# Patient Record
Sex: MALE | Race: WHITE | NOT HISPANIC OR LATINO | Employment: OTHER | URBAN - METROPOLITAN AREA
[De-identification: names, ages, dates, MRNs, and addresses within clinical notes are randomized per-mention and may not be internally consistent; named-entity substitution may affect disease eponyms.]

---

## 2017-01-18 ENCOUNTER — HOSPITAL ENCOUNTER (OUTPATIENT)
Dept: LAB | Facility: MEDICAL CENTER | Age: 67
End: 2017-01-18
Attending: FAMILY MEDICINE
Payer: MEDICARE

## 2017-01-18 DIAGNOSIS — Z00.00 HEALTH CARE MAINTENANCE: ICD-10-CM

## 2017-01-18 LAB
ALBUMIN SERPL BCP-MCNC: 4.3 G/DL (ref 3.2–4.9)
ALBUMIN/GLOB SERPL: 1.7 G/DL
ALP SERPL-CCNC: 56 U/L (ref 30–99)
ALT SERPL-CCNC: 18 U/L (ref 2–50)
ANION GAP SERPL CALC-SCNC: 7 MMOL/L (ref 0–11.9)
AST SERPL-CCNC: 16 U/L (ref 12–45)
BASOPHILS # BLD AUTO: 0.06 K/UL (ref 0–0.12)
BASOPHILS NFR BLD AUTO: 1 % (ref 0–1.8)
BILIRUB SERPL-MCNC: 0.8 MG/DL (ref 0.1–1.5)
BUN SERPL-MCNC: 14 MG/DL (ref 8–22)
CALCIUM SERPL-MCNC: 10 MG/DL (ref 8.5–10.5)
CHLORIDE SERPL-SCNC: 103 MMOL/L (ref 96–112)
CHOLEST SERPL-MCNC: 338 MG/DL (ref 100–199)
CO2 SERPL-SCNC: 25 MMOL/L (ref 20–33)
CREAT SERPL-MCNC: 1.04 MG/DL (ref 0.5–1.4)
EOSINOPHIL # BLD: 0.25 K/UL (ref 0–0.51)
EOSINOPHIL NFR BLD AUTO: 4.1 % (ref 0–6.9)
ERYTHROCYTE [DISTWIDTH] IN BLOOD BY AUTOMATED COUNT: 46 FL (ref 35.9–50)
GLOBULIN SER CALC-MCNC: 2.5 G/DL (ref 1.9–3.5)
GLUCOSE SERPL-MCNC: 113 MG/DL (ref 65–99)
HCT VFR BLD AUTO: 48.6 % (ref 42–52)
HDLC SERPL-MCNC: 55 MG/DL
HGB BLD-MCNC: 16.6 G/DL (ref 14–18)
IMM GRANULOCYTES # BLD AUTO: 0.03 K/UL (ref 0–0.11)
IMM GRANULOCYTES NFR BLD AUTO: 0.5 % (ref 0–0.9)
LDLC SERPL CALC-MCNC: 234 MG/DL
LYMPHOCYTES # BLD: 2.33 K/UL (ref 1–4.8)
LYMPHOCYTES NFR BLD AUTO: 38.1 % (ref 22–41)
MCH RBC QN AUTO: 31.4 PG (ref 27–33)
MCHC RBC AUTO-ENTMCNC: 34.2 G/DL (ref 33.7–35.3)
MCV RBC AUTO: 92 FL (ref 81.4–97.8)
MONOCYTES # BLD: 0.35 K/UL (ref 0–0.85)
MONOCYTES NFR BLD AUTO: 5.7 % (ref 0–13.4)
NEUTROPHILS # BLD: 3.1 K/UL (ref 1.82–7.42)
NEUTROPHILS NFR BLD AUTO: 50.6 % (ref 44–72)
NRBC # BLD AUTO: 0 K/UL
NRBC BLD-RTO: 0 /100 WBC
PLATELET # BLD AUTO: 216 K/UL (ref 164–446)
PMV BLD AUTO: 11.8 FL (ref 9–12.9)
POTASSIUM SERPL-SCNC: 4.5 MMOL/L (ref 3.6–5.5)
PROT SERPL-MCNC: 6.8 G/DL (ref 6–8.2)
PSA SERPL DL<=0.01 NG/ML-MCNC: 0.6 NG/ML (ref 0–4)
RBC # BLD AUTO: 5.28 M/UL (ref 4.7–6.1)
SODIUM SERPL-SCNC: 135 MMOL/L (ref 135–145)
TRIGL SERPL-MCNC: 246 MG/DL (ref 0–149)
TSH SERPL DL<=0.005 MIU/L-ACNC: 1.2 UIU/ML (ref 0.3–3.7)
WBC # BLD AUTO: 6.1 K/UL (ref 4.8–10.8)

## 2017-01-18 PROCEDURE — 36415 COLL VENOUS BLD VENIPUNCTURE: CPT | Mod: GY

## 2017-01-18 PROCEDURE — 85025 COMPLETE CBC W/AUTO DIFF WBC: CPT | Mod: GY

## 2017-01-18 PROCEDURE — 84443 ASSAY THYROID STIM HORMONE: CPT | Mod: GY

## 2017-01-18 PROCEDURE — 80061 LIPID PANEL: CPT | Mod: GY

## 2017-01-18 PROCEDURE — 84153 ASSAY OF PSA TOTAL: CPT | Mod: GY

## 2017-01-18 PROCEDURE — 80053 COMPREHEN METABOLIC PANEL: CPT | Mod: GY

## 2017-01-19 ENCOUNTER — OFFICE VISIT (OUTPATIENT)
Dept: MEDICAL GROUP | Facility: MEDICAL CENTER | Age: 67
End: 2017-01-19
Payer: MEDICARE

## 2017-01-19 VITALS
TEMPERATURE: 98.1 F | BODY MASS INDEX: 26.99 KG/M2 | DIASTOLIC BLOOD PRESSURE: 90 MMHG | OXYGEN SATURATION: 95 % | HEIGHT: 72 IN | SYSTOLIC BLOOD PRESSURE: 158 MMHG | WEIGHT: 199.3 LBS | HEART RATE: 70 BPM | RESPIRATION RATE: 20 BRPM

## 2017-01-19 DIAGNOSIS — E78.2 MIXED HYPERLIPIDEMIA: ICD-10-CM

## 2017-01-19 DIAGNOSIS — R73.01 IMPAIRED FASTING GLUCOSE: ICD-10-CM

## 2017-01-19 DIAGNOSIS — I10 ESSENTIAL HYPERTENSION: ICD-10-CM

## 2017-01-19 PROCEDURE — 99214 OFFICE O/P EST MOD 30 MIN: CPT | Performed by: FAMILY MEDICINE

## 2017-01-19 RX ORDER — LISINOPRIL 20 MG/1
20 TABLET ORAL DAILY
Qty: 90 TAB | Refills: 3 | Status: SHIPPED | OUTPATIENT
Start: 2017-01-19 | End: 2017-11-15 | Stop reason: SDUPTHER

## 2017-01-19 RX ORDER — ATORVASTATIN CALCIUM 80 MG/1
80 TABLET, FILM COATED ORAL EVERY EVENING
Qty: 30 TAB | Refills: 11 | Status: SHIPPED | OUTPATIENT
Start: 2017-01-19 | End: 2017-01-19 | Stop reason: SDUPTHER

## 2017-01-19 RX ORDER — ATORVASTATIN CALCIUM 80 MG/1
80 TABLET, FILM COATED ORAL EVERY EVENING
Qty: 30 TAB | Refills: 11 | Status: SHIPPED | OUTPATIENT
Start: 2017-01-19 | End: 2017-11-15 | Stop reason: SDUPTHER

## 2017-01-19 NOTE — MR AVS SNAPSHOT
Prieto Holm   2017 10:00 AM   Office Visit   MRN: [id number]    Department:  [place] [address]   Dept Phone:  [phone]    Description:  Male : 1950   Provider:  Radha Fleming M.D.           Reason for Visit     Hyperlipidemia elevated sugar levels, review recent lab results      Allergies as of 2017     No Known Allergies      You were diagnosed with     Mixed hyperlipidemia   [272.2.ICD-9-CM]       Impaired fasting glucose   [790.21.ICD-9-CM]       Essential hypertension   [5248814]         Vital Signs     Blood Pressure Pulse Temperature Respirations Height Weight    158/90 mmHg 70 36.7 °C (98.1 °F) 20 1.829 m (6') 90.4 kg (199 lb 4.7 oz)    Body Mass Index Oxygen Saturation Smoking Status             27.02 kg/m2 95% Former Smoker         Basic Information     Date Of Birth Sex Race Ethnicity Preferred Language    1950 Male White Non- English      Your appointments     2017 11:20 AM   Established Patient with Radha Fleming M.D.   [facility]    [address]  [address]  [place] [id number] [phone]           You will be receiving a confirmation call a few days before your appointment from our automated call confirmation system.              Problem List              ICD-10-CM Priority Class Noted - Resolved    Gout M10.9   Unknown - Present    Hyperlipidemia E78.5   Unknown - Present    Hypertension I10   Unknown - Present    History of bladder cancer Z85.51   10/18/2016 - Present    Primary insomnia F51.01   10/18/2016 - Present    Acute pain of left knee M25.562   10/18/2016 - Present    Health care maintenance Z00.00   10/18/2016 - Present    Impaired fasting glucose R73.01   2017 - Present      Health Maintenance        Date Due Completion Dates    COLONOSCOPY 10/25/2001 ---    IMM ZOSTER VACCINE 10/25/2011 ---    IMM PNEUMOCOCCAL 65+ (ADULT) LOW/MEDIUM RISK SERIES (1  of 2 - PCV13) 10/25/2016 ---    IMM DTaP/Tdap/Td Vaccine (2 - Td) 11/18/2024 11/18/2014            Current Immunizations     Influenza Vaccine Quad Inj (Preserved) 10/18/2016    Tdap Vaccine 11/18/2014      Below and/or attached are the medications your provider expects you to take. Review all of your home medications and newly ordered medications with your provider and/or pharmacist. Follow medication instructions as directed by your provider and/or pharmacist. Please keep your medication list with you and share with your provider. Update the information when medications are discontinued, doses are changed, or new medications (including over-the-counter products) are added; and carry medication information at all times in the event of emergency situations     Allergies:  No Known Allergies          Medications  Valid as of: January 19, 2017 - 10:21 AM    Generic Name Brand Name Tablet Size Instructions for use    Aspirin (Tablet Delayed Response) ECOTRIN 81 MG Take 1 Tab by mouth every day.        Atorvastatin Calcium (Tab) LIPITOR 80 MG Take 1 Tab by mouth every evening.        Lisinopril (Tab) PRINIVIL 20 MG Take 1 Tab by mouth every day.        .                 Medicines prescribed today were sent to:     [facility] - [place], [place] - [address] AT PORTAL TO REGISTERED [facility] SITES    [address] [facility] [address]    Phone: [phone] Fax: [phone]    Open 24 Hours?: No    [id number] - [place], [place] - [address]    [address] [facility] [place] [address]    Phone: [phone] Fax: [phone]    Open 24 Hours?: No      Medication refill instructions:       If your prescription bottle indicates you have medication refills left, it is not necessary to call your provider’s office. Please contact your pharmacy and they will refill your medication.    If your prescription bottle indicates you do not have any refills left, you may request refills at any time  through one of the following ways: The online Marketfish system (except Urgent Care), by calling your provider’s office, or by asking your pharmacy to contact your provider’s office with a refill request. Medication refills are processed only during regular business hours and may not be available until the next business day. Your provider may request additional information or to have a follow-up visit with you prior to refilling your medication.   *Please Note: Medication refills are assigned a new Rx number when refilled electronically. Your pharmacy may indicate that no refills were authorized even though a new prescription for the same medication is available at the pharmacy. Please request the medicine by name with the pharmacy before contacting your provider for a refill.        Your To Do List     Future Labs/Procedures Complete By Expires    HEMOGLOBIN A1C  As directed 1/20/2018    LIPID PROFILE  As directed 1/20/2018      Instructions    Monitor blood pressure. If it stays above 140/90 please send me an email on [facility] call or come in  Start daily aspirin 81mg  For the cholesterol medication - cut that in half for the first week          Marketfish Access Code: Activation code not generated  Current Marketfish Status: Active

## 2017-01-19 NOTE — PROGRESS NOTES
Subjective:   Prieto Holm is a 66 y.o. male here today for   Chief Complaint   Patient presents with   • Hyperlipidemia     elevated sugar levels, review recent lab results       1. Mixed hyperlipidemia  This is chronic but not controlled. Patient was on Pitavastatin (Lovalo) in the past but quit this about 3 months ago. He wanted to see if he is controlled enough off of the medication.. He was on Crestor in the past but this did cause myalgias. He has never tried another statin. The Pitavastatin is too expensive for him.     Results for PRIETO HOLM (MRN [id number]) as of 1/19/2017 12:01   Ref. Range 1/18/2017 08:39   Cholesterol,Tot Latest Ref Range: 100-199 mg/dL 338 (H)   Triglycerides Latest Ref Range: 0-149 mg/dL 246 (H)   HDL Latest Ref Range: >=40 mg/dL 55   LDL Latest Ref Range: <100 mg/dL 234 (H)     2. Impaired fasting glucose  This is a new problem. Fasting glucose is 113. He does not watch his carbohydrate intake at all. He often eats pastries and donuts. He denies any polyuria or polydipsia. He is not overweight. She does not have a family history of diabetes.  Lab Results   Component Value Date/Time    SODIUM 135 01/18/2017 08:39 AM    POTASSIUM 4.5 01/18/2017 08:39 AM    CHLORIDE 103 01/18/2017 08:39 AM    CO2 25 01/18/2017 08:39 AM    GLUCOSE 113* 01/18/2017 08:39 AM    BUN 14 01/18/2017 08:39 AM    CREATININE 1.04 01/18/2017 08:39 AM        3. Essential hypertension  This is chronic. He is currently on lisinopril 20 mg daily. He forgot to take this medication last night. He is elevated today. He denies any headache, shortness of breath, cough, lower extremity edema. He is not on a daily aspirin.    Current medicines (including changes today)  Current Outpatient Prescriptions   Medication Sig Dispense Refill   • atorvastatin (LIPITOR) 80 MG tablet Take 1 Tab by mouth every evening. 30 Tab 11   • lisinopril (PRINIVIL) 20 MG Tab Take 1 Tab by mouth every day. 90 Tab 3   • aspirin EC  (ECOTRIN) 81 MG Tablet Delayed Response Take 1 Tab by mouth every day. 30 Tab      No current facility-administered medications for this visit.     He  has a past medical history of Gout; Hyperlipidemia; Hypertension; and Cancer (CMS-HCC).    ROS   No chest pain, no shortness of breath, no abdominal pain       Objective:     Blood pressure 158/90, pulse 70, temperature 36.7 °C (98.1 °F), resp. rate 20, height 1.829 m (6'), weight 90.4 kg (199 lb 4.7 oz), SpO2 95 %. Body mass index is 27.02 kg/(m^2).   Physical Exam:  Constitutional: Alert, no distress.  Skin: Warm, dry, good turgor, no rashes in visible areas.  Eye: Equal, round and reactive, conjunctiva clear, lids normal.  ENMT: Lips without lesions, good dentition, oropharynx clear.  Neck: Trachea midline, no masses, no thyromegaly. No cervical or supraclavicular lymphadenopathy  Respiratory: Unlabored respiratory effort, lungs clear to auscultation, no wheezes, no ronchi.  Cardiovascular: Normal S1, S2, no murmur, no edema.  Abdomen: Soft, non-tender, no masses, no hepatosplenomegaly.  Psych: Alert and oriented x3, normal affect and mood.        Assessment and Plan:   The following treatment plan was discussed    1. Mixed hyperlipidemia  Chronic, not controlled  ASCVD 17.2% 10 year risk  Start atorvastatin 80 mg daily, start with half a tab for one week  Recheck labs in 3 months  Start daily aspirin  - atorvastatin (LIPITOR) 80 MG tablet; Take 1 Tab by mouth every evening.  Dispense: 30 Tab; Refill: 11  - LIPID PROFILE; Future    2. Impaired fasting glucose  This is a new problem and not controlled  Discussed risk of diabetes  Limit carbohydrates and nutrition discussed   check hemoglobin A1c in 3 months  - HEMOGLOBIN A1C; Future    3. Essential hypertension  Chronic, not controlled currently  Elevated blood pressures A likely secondary to not taking his medication over the last 2 days  Discussed importance of compliance with this medication  Home blood pressure  monitoring, patient will email me if his blood pressure remains elevated  Return in 3 months  Started daily aspirin  - lisinopril (PRINIVIL) 20 MG Tab; Take 1 Tab by mouth every day.  Dispense: 90 Tab; Refill: 3      Followup: Return in about 3 months (around 4/19/2017) for Labs, HLD, IFG, HTN, possible knee inj, long.

## 2017-01-19 NOTE — PATIENT INSTRUCTIONS
Monitor blood pressure. If it stays above 140/90 please send me an email on [facility] call or come in  Start daily aspirin 81mg  For the cholesterol medication - cut that in half for the first week

## 2017-04-12 ENCOUNTER — HOSPITAL ENCOUNTER (OUTPATIENT)
Dept: LAB | Facility: MEDICAL CENTER | Age: 67
End: 2017-04-12
Attending: FAMILY MEDICINE
Payer: MEDICARE

## 2017-04-12 DIAGNOSIS — R73.01 IMPAIRED FASTING GLUCOSE: ICD-10-CM

## 2017-04-12 DIAGNOSIS — E78.2 MIXED HYPERLIPIDEMIA: ICD-10-CM

## 2017-04-12 LAB
CHOLEST SERPL-MCNC: 181 MG/DL (ref 100–199)
EST. AVERAGE GLUCOSE BLD GHB EST-MCNC: 108 MG/DL
HBA1C MFR BLD: 5.4 % (ref 0–5.6)
HDLC SERPL-MCNC: 53 MG/DL
LDLC SERPL CALC-MCNC: 95 MG/DL
TRIGL SERPL-MCNC: 166 MG/DL (ref 0–149)

## 2017-04-12 PROCEDURE — 80061 LIPID PANEL: CPT

## 2017-04-12 PROCEDURE — 83036 HEMOGLOBIN GLYCOSYLATED A1C: CPT | Mod: GA

## 2017-04-12 PROCEDURE — 36415 COLL VENOUS BLD VENIPUNCTURE: CPT

## 2017-04-19 ENCOUNTER — OFFICE VISIT (OUTPATIENT)
Dept: MEDICAL GROUP | Facility: LAB | Age: 67
End: 2017-04-19
Payer: MEDICARE

## 2017-04-19 VITALS
DIASTOLIC BLOOD PRESSURE: 72 MMHG | HEART RATE: 60 BPM | SYSTOLIC BLOOD PRESSURE: 152 MMHG | BODY MASS INDEX: 26.58 KG/M2 | WEIGHT: 196.21 LBS | TEMPERATURE: 97.7 F | RESPIRATION RATE: 16 BRPM | OXYGEN SATURATION: 98 % | HEIGHT: 72 IN

## 2017-04-19 DIAGNOSIS — M25.562 CHRONIC PAIN OF LEFT KNEE: ICD-10-CM

## 2017-04-19 DIAGNOSIS — Z00.00 HEALTH MAINTENANCE EXAMINATION: ICD-10-CM

## 2017-04-19 DIAGNOSIS — E78.2 MIXED HYPERLIPIDEMIA: ICD-10-CM

## 2017-04-19 DIAGNOSIS — I10 ESSENTIAL HYPERTENSION: ICD-10-CM

## 2017-04-19 DIAGNOSIS — G89.29 CHRONIC PAIN OF LEFT KNEE: ICD-10-CM

## 2017-04-19 DIAGNOSIS — R73.01 IMPAIRED FASTING GLUCOSE: ICD-10-CM

## 2017-04-19 DIAGNOSIS — Z12.11 SCREENING FOR MALIGNANT NEOPLASM OF COLON: ICD-10-CM

## 2017-04-19 PROCEDURE — 1101F PT FALLS ASSESS-DOCD LE1/YR: CPT | Performed by: FAMILY MEDICINE

## 2017-04-19 PROCEDURE — 4040F PNEUMOC VAC/ADMIN/RCVD: CPT | Mod: 8P | Performed by: FAMILY MEDICINE

## 2017-04-19 PROCEDURE — G8432 DEP SCR NOT DOC, RNG: HCPCS | Performed by: FAMILY MEDICINE

## 2017-04-19 PROCEDURE — 99214 OFFICE O/P EST MOD 30 MIN: CPT | Mod: 25 | Performed by: FAMILY MEDICINE

## 2017-04-19 PROCEDURE — 20610 DRAIN/INJ JOINT/BURSA W/O US: CPT | Performed by: FAMILY MEDICINE

## 2017-04-19 PROCEDURE — 1036F TOBACCO NON-USER: CPT | Performed by: FAMILY MEDICINE

## 2017-04-19 PROCEDURE — G8419 CALC BMI OUT NRM PARAM NOF/U: HCPCS | Performed by: FAMILY MEDICINE

## 2017-04-19 NOTE — MR AVS SNAPSHOT
"        Prieto Holm   2017 11:00 AM   Office Visit   MRN: [id number]    Department:  [facility]   Dept Phone:  [phone]    Description:  Male : 1950   Provider:  Radha Fleming M.D.           Reason for Visit     Follow-Up labs results    Injections left knee      Allergies as of 2017     No Known Allergies      You were diagnosed with     Health maintenance examination   [302636]       Mixed hyperlipidemia   [272.2.ICD-9-CM]       Essential hypertension   [9834619]       Impaired fasting glucose   [790.21.ICD-9-CM]       Screening for malignant neoplasm of colon   [3907790]         Vital Signs     Blood Pressure Pulse Temperature Respirations Height Weight    152/72 mmHg 60 36.5 °C (97.7 °F) 16 1.829 m (6' 0.01\") 89 kg (196 lb 3.4 oz)    Body Mass Index Oxygen Saturation Smoking Status             26.60 kg/m2 98% Former Smoker         Basic Information     Date Of Birth Sex Race Ethnicity Preferred Language    1950 Male White Non- English      Your appointments     Nov 15, 2017 11:00 AM   ANNUAL EXAM PREVENTATIVE with Radha Fleming M.D.   [facility] [place] [facility] [place] (--)    [address] [address] [phone]              Problem List              ICD-10-CM Priority Class Noted - Resolved    Gout M10.9   Unknown - Present    Hyperlipidemia E78.5   Unknown - Present    Hypertension I10   Unknown - Present    History of bladder cancer Z85.51   10/18/2016 - Present    Primary insomnia F51.01   10/18/2016 - Present    Acute pain of left knee M25.562   10/18/2016 - Present    Health care maintenance Z00.00   10/18/2016 - Present    Impaired fasting glucose R73.01   2017 - Present      Health Maintenance        Date Due Completion Dates    COLONOSCOPY 10/25/2001 ---    IMM ZOSTER VACCINE 10/25/2011 ---    IMM PNEUMOCOCCAL 65+ (ADULT) LOW/MEDIUM RISK SERIES (1 of 2 - PCV13) 10/25/2016 ---    IMM DTaP/Tdap/Td " Vaccine (2 - Td) 11/18/2024 11/18/2014            Current Immunizations     Influenza Vaccine Quad Inj (Preserved) 10/18/2016    Tdap Vaccine 11/18/2014      Below and/or attached are the medications your provider expects you to take. Review all of your home medications and newly ordered medications with your provider and/or pharmacist. Follow medication instructions as directed by your provider and/or pharmacist. Please keep your medication list with you and share with your provider. Update the information when medications are discontinued, doses are changed, or new medications (including over-the-counter products) are added; and carry medication information at all times in the event of emergency situations     Allergies:  No Known Allergies          Medications  Valid as of: April 19, 2017 - 11:21 AM    Generic Name Brand Name Tablet Size Instructions for use    Aspirin (Tablet Delayed Response) ECOTRIN 81 MG Take 1 Tab by mouth every day.        Atorvastatin Calcium (Tab) LIPITOR 80 MG Take 1 Tab by mouth every evening.        Lisinopril (Tab) PRINIVIL 20 MG Take 1 Tab by mouth every day.        .                 Medicines prescribed today were sent to:     [facility] - [place], [place] - [address] AT PORTAL TO REGISTERED [facility] SITES    [address] [facility] [address]    Phone: [phone] Fax: [phone]    Open 24 Hours?: No    [id number] - [place], [place] - [address]    [address] [facility] [place] [address]    Phone: [phone] Fax: [phone]    Open 24 Hours?: No      Medication refill instructions:       If your prescription bottle indicates you have medication refills left, it is not necessary to call your provider’s office. Please contact your pharmacy and they will refill your medication.    If your prescription bottle indicates you do not have any refills left, you may request refills at any time through one of the following ways: The online [employer]  system (except Urgent Care), by calling your provider’s office, or by asking your pharmacy to contact your provider’s office with a refill request. Medication refills are processed only during regular business hours and may not be available until the next business day. Your provider may request additional information or to have a follow-up visit with you prior to refilling your medication.   *Please Note: Medication refills are assigned a new Rx number when refilled electronically. Your pharmacy may indicate that no refills were authorized even though a new prescription for the same medication is available at the pharmacy. Please request the medicine by name with the pharmacy before contacting your provider for a refill.        Your To Do List     Future Labs/Procedures Complete By Expires    CBC WITH DIFFERENTIAL  As directed 4/20/2018    COMP METABOLIC PANEL  As directed 4/20/2018    HEMOGLOBIN A1C  As directed 4/20/2018    LIPID PROFILE  As directed 4/20/2018    OCCULT BLOOD FECES IMMUNOASSAY  As directed 4/19/2018         [employer] Access Code: Activation code not generated  Current Telovations Status: Active

## 2017-04-19 NOTE — PROGRESS NOTES
Subjective:   Prieto Holm is a 66 y.o. male here today for   Chief Complaint   Patient presents with   • Follow-Up     labs results   • Injections     left knee       1. Chronic pain of left knee  This is chronic over the last several months. She has had bilateral meniscus removals several years ago. The pain on his left knee is lateral with achiness throughout the knee. It does click. This is never gives out on him. He denies any weakness.    2. Mixed hyperlipidemia  This is chronic. Doing well.  Taking atorvastatin 80 mg daily as prescribed. No myalgias, GI upset, or other side effects from medication. Denies CP or stroke symptoms. Also taking a daily ASA. Last lipid panel done last week and was reviewed with the patient.       Results for PRIETO HOLM (MRN [id number]) as of 4/19/2017 14:28   Ref. Range 4/12/2017 07:01   Cholesterol,Tot Latest Ref Range: 100-199 mg/dL 181   Triglycerides Latest Ref Range: 0-149 mg/dL 166 (H)   HDL Latest Ref Range: >=40 mg/dL 53   LDL Latest Ref Range: <100 mg/dL 95     3. Essential hypertension  This is chronic. His blood pressure is slightly elevated today.. Monitoring BP at home. His home BP readings are on average 110-120/70-80. Currently taking lisinopril 20 mg daily as directed. Also taking baby aspirin. Denies lightheadedness, vision changes, headache, palpitations, chest pain, or leg swelling.      4. Impaired fasting glucose  This is chronic. Patient had a hemoglobin A1c checked which was normal. He has been working on his diet  Results for PRIETO HOLM (MRN [id number]) as of 4/19/2017 14:28   Ref. Range 4/12/2017 07:01   Glycohemoglobin Latest Ref Range: 0.0-5.6 % 5.4   Estim. Avg Glu Latest Units: mg/dL 108     5. Health maintenance examination   6. Screening for malignant neoplasm of colon  Colonoscopy last done 9-10 years ago. Records have been requested. He would like to do a fit test            Current medicines (including changes  "today)  Current Outpatient Prescriptions   Medication Sig Dispense Refill   • atorvastatin (LIPITOR) 80 MG tablet Take 1 Tab by mouth every evening. 30 Tab 11   • lisinopril (PRINIVIL) 20 MG Tab Take 1 Tab by mouth every day. 90 Tab 3   • aspirin EC (ECOTRIN) 81 MG Tablet Delayed Response Take 1 Tab by mouth every day. 30 Tab      No current facility-administered medications for this visit.     He  has a past medical history of Gout; Hyperlipidemia; Hypertension; and Cancer (CMS-HCC).    ROS   No fevers  No bowel changes  No LE edema       Objective:     Blood pressure 152/72, pulse 60, temperature 36.5 °C (97.7 °F), resp. rate 16, height 1.829 m (6' 0.01\"), weight 89 kg (196 lb 3.4 oz), SpO2 98 %. Body mass index is 26.6 kg/(m^2).   Physical Exam:  General: No acute distress, WN/WD  HEENT: NC/AT, lids normal without lesions, good dentition  Neck: Trachea midline  Cardiovascular: Regular Rate  Pulmonary: No respiratory distress  Skin: No rashes noted  Neurologic: CN II-XII grossly intact, normal gait  Psych: Alert and oriented x 3, normal insight and judgement  MSK: crepitus of the left knee, no joint instability      Assessment and Plan:   The following treatment plan was discussed    1. Chronic pain of left knee  Chronic, unstable, knee injections done today    DIAGNOSIS:  Chronic knee pain with arthritis    INDICATIONS:  Pain    Informed consent was obtained. We discussed the risks and benefits including bleeding, infection, fat necrosis, pain, no improvement current pain    The joint was prepped with chlorhexadine and anesthetized with a topical spray. A 22 guage needle was inserted into the superior infrapatellar aspect of the joint from a lateral approach. . 1 cc of 2% xylocaine, 1 cc of bupivicaine, and 2 cc of kenalog 40 was then injected into the joint. The area cleansed and dressed.    COMPLICATIONS: None    2. Mixed hyperlipidemia  Chronic, stable  Repeat labs in 6 months  Continue atorvastatin 80 mg " daily   LIPID PROFILE; Future    3. Essential hypertension  Chronic, elevated today but blood pressures at home are normal. He states that his elevated blood pressure today was due to him drinking excessive amount of caffeine this morning which he does not generally do.  Continue home blood pressure monitoring  - COMP METABOLIC PANEL; Future  - CBC WITH DIFFERENTIAL; Future    4. Impaired fasting glucose  Chronic, improved, normal hemoglobin A1c  Recheck 6 months  - HEMOGLOBIN A1C; Future    5. Health maintenance examination  Labs ordered for next visit   Records requested from colonoscopy  - LIPID PROFILE; Future  - COMP METABOLIC PANEL; Future  - CBC WITH DIFFERENTIAL; Future  - HEMOGLOBIN A1C; Future    6. Screening for malignant neoplasm of colon  - OCCULT BLOOD FECES IMMUNOASSAY; Future      Followup: Return in about 6 months (around 10/19/2017) for Medicare annual .       This note was created using voice recognition software. I have made every reasonable attempt to correct errors, however, I do anticipate some grammatical errors.

## 2017-11-08 ENCOUNTER — TELEPHONE (OUTPATIENT)
Dept: MEDICAL GROUP | Facility: LAB | Age: 67
End: 2017-11-08

## 2017-11-08 NOTE — TELEPHONE ENCOUNTER
ANNUAL WELLNESS VISIT PRE-VISIT PLANNING     1.  Reviewed note from last office visit with PCP: YES    2.  If any orders were placed at last visit, do we have Results/Consult Notes?        •  Labs - Labs ordered, NOT completed. Patient advised to complete prior to next appointment.   Note: If patient appointment is for lab review and patient did not complete labs, check with provider if OK to reschedule patient until labs completed.       •  Imaging - Imaging was not ordered at last office visit.       •  Referrals - No referrals were ordered at last office visit.    3.  Immunizations were updated in [facility] using WebIZ?: Yes       •  WebIZ Recommendations: FLU, PREVNAR (PCV13)  and ZOSTAVAX (Shingles)       •  Is patient due for Tdap? NO       •  Is patient due for Shingles? NO     4.  Patient is due for the following Health Maintenance Topics:   Health Maintenance Due   Topic Date Due   • COLONOSCOPY  10/25/2001   • IMM ZOSTER VACCINE  10/25/2011   • IMM PNEUMOCOCCAL 65+ (ADULT) LOW/MEDIUM RISK SERIES (1 of 2 - PCV13) 10/25/2016   • Annual Wellness Visit  10/25/2016   • IMM INFLUENZA (1) 09/01/2017       - Patient has completed TDAP Immunization(s) per WebIZ. Chart has been updated.      5.  Reviewed/Updated the following with patient:       •   Preferred Pharmacy? YES       •   Preferred Lab? YES       •   Preferred Communication? YES       •   Allergies? YES       •   Medications? YES. Was Abstract Encounter opened and chart updated? YES       •   Social History? YES. Was Abstract Encounter opened and chart updated? YES       •   Family History (document living status of immediate family members and if + hx of cancer, diabetes, hypertension, hyperlipidemia, heart attack, stroke) YES. Was Abstract Encounter opened and chart updated? YES    6.  Care Team Updated:       •   DME Company (gait device, O2, CPAP, etc.): N\A       •   Other Specialists (eye doctor, derm, GYN, cardiology, endo, etc): YES    7.  Patient has  the following Care Path diagnoses on Problem List:  NONE    8.  Specialty Comments was updated with diagnosis information provided by SCP: N\A    9.  Patient was advised: “This is a free wellness visit. The provider will screen for medical conditions to help you stay healthy. If you have other concerns to address you may be asked to discuss these at a separate visit or there may be an additional fee.”     6.  Patient was informed to arrive 15 min prior to their scheduled appointment and bring in their medication bottles.

## 2017-11-15 ENCOUNTER — OFFICE VISIT (OUTPATIENT)
Dept: MEDICAL GROUP | Facility: LAB | Age: 67
End: 2017-11-15
Payer: MEDICARE

## 2017-11-15 VITALS
BODY MASS INDEX: 26.55 KG/M2 | SYSTOLIC BLOOD PRESSURE: 128 MMHG | OXYGEN SATURATION: 96 % | WEIGHT: 196 LBS | RESPIRATION RATE: 14 BRPM | TEMPERATURE: 97.7 F | DIASTOLIC BLOOD PRESSURE: 82 MMHG | HEIGHT: 72 IN | HEART RATE: 60 BPM

## 2017-11-15 DIAGNOSIS — G89.29 CHRONIC PAIN OF LEFT KNEE: ICD-10-CM

## 2017-11-15 DIAGNOSIS — R73.01 IMPAIRED FASTING GLUCOSE: ICD-10-CM

## 2017-11-15 DIAGNOSIS — I10 ESSENTIAL HYPERTENSION: ICD-10-CM

## 2017-11-15 DIAGNOSIS — E78.2 MIXED HYPERLIPIDEMIA: ICD-10-CM

## 2017-11-15 DIAGNOSIS — Z85.51 HISTORY OF BLADDER CANCER: ICD-10-CM

## 2017-11-15 DIAGNOSIS — Z23 NEED FOR VACCINATION: ICD-10-CM

## 2017-11-15 DIAGNOSIS — Z00.00 MEDICARE ANNUAL WELLNESS VISIT, INITIAL: Primary | ICD-10-CM

## 2017-11-15 DIAGNOSIS — Z12.11 SCREENING FOR MALIGNANT NEOPLASM OF COLON: ICD-10-CM

## 2017-11-15 DIAGNOSIS — M1A.09X0 IDIOPATHIC CHRONIC GOUT OF MULTIPLE SITES WITHOUT TOPHUS: ICD-10-CM

## 2017-11-15 DIAGNOSIS — M25.562 CHRONIC PAIN OF LEFT KNEE: ICD-10-CM

## 2017-11-15 PROCEDURE — 90670 PCV13 VACCINE IM: CPT | Performed by: FAMILY MEDICINE

## 2017-11-15 PROCEDURE — G0009 ADMIN PNEUMOCOCCAL VACCINE: HCPCS | Performed by: FAMILY MEDICINE

## 2017-11-15 PROCEDURE — G0439 PPPS, SUBSEQ VISIT: HCPCS | Mod: 25 | Performed by: FAMILY MEDICINE

## 2017-11-15 RX ORDER — ATORVASTATIN CALCIUM 80 MG/1
80 TABLET, FILM COATED ORAL EVERY EVENING
Qty: 90 TAB | Refills: 3 | Status: SHIPPED | OUTPATIENT
Start: 2017-11-15 | End: 2018-10-22 | Stop reason: SDUPTHER

## 2017-11-15 RX ORDER — LISINOPRIL 20 MG/1
20 TABLET ORAL DAILY
Qty: 90 TAB | Refills: 3 | Status: SHIPPED | OUTPATIENT
Start: 2017-11-15 | End: 2018-10-22 | Stop reason: SDUPTHER

## 2017-11-15 ASSESSMENT — PATIENT HEALTH QUESTIONNAIRE - PHQ9: CLINICAL INTERPRETATION OF PHQ2 SCORE: 0

## 2017-11-15 NOTE — PROGRESS NOTES
Chief Complaint   Patient presents with   • Annual Wellness Visit         HPI:  Prieto is a 67 y.o. here for Medicare Annual Wellness Visit    Patient Active Problem List    Diagnosis Date Noted   • Impaired fasting glucose 01/19/2017   • History of bladder cancer 10/18/2016   • Primary insomnia 10/18/2016   • Chronic pain of left knee 10/18/2016   • Health care maintenance 10/18/2016   • Gout    • Hyperlipidemia    • Hypertension        Current Outpatient Prescriptions   Medication Sig Dispense Refill   • lisinopril (PRINIVIL) 20 MG Tab Take 1 Tab by mouth every day. 90 Tab 3   • atorvastatin (LIPITOR) 80 MG tablet Take 1 Tab by mouth every evening. 90 Tab 3   • aspirin EC (ECOTRIN) 81 MG Tablet Delayed Response Take 1 Tab by mouth every day. 30 Tab      No current facility-administered medications for this visit.         Patient is taking medications as noted in medication list.  Current supplements as per medication list.     Allergies: Patient has no known allergies.    Current social contact/activities: playing golf, going to festivals, chili cook off     Is patient current with immunizations? No, due for FLU and PREVNAR (PCV13) . Patient is interested in receiving PREVNAR (PCV13)  today.    He  reports that he quit smoking about 7 years ago. His smoking use included Cigarettes. He has a 35.00 pack-year smoking history. He has never used smokeless tobacco. He reports that he drinks about 3.6 oz of alcohol per week . He reports that he does not use drugs.  Counseling given: Not Answered        DPA/Advanced directive: Patient does not have an Advanced Directive.  A packet and workshop information was given on Advanced Directives.    ROS:    Gait: Uses no assistive device   Ostomy: no   Other tubes: no   Amputations: no   Chronic oxygen use no   Last eye exam 3/2017   Wears hearing aids: no   : Denies incontinence.     Depression Screening    Little interest or pleasure in doing things?  0 - not at all  Feeling  down, depressed, or hopeless? 0 - not at all  Patient Health Questionnaire Score: 0    If depressive symptoms identified deferred to follow up visit unless specifically addressed in assessment and plan.    Interpretation of PHQ-9 Total Score   Score Severity   1-4 No Depression   5-9 Mild Depression   10-14 Moderate Depression   15-19 Moderately Severe Depression   20-27 Severe Depression    Screening for Cognitive Impairment    Three Minute Recall (apple, watch, jermaine)  2/3    Draw clock face with all 12 numbers set to the hand to show 10 minutes past 11 o'clock   5/5  If cognitive concerns identified, deferred for follow up unless specifically addressed in assessment and plan.    Fall Risk Assessment    Has the patient had two or more falls in the last year or any fall with injury in the last year?  No  If fall risk identified, deferred for follow up unless specifically addressed in assessment and plan.    Safety Assessment    Throw rugs on floor.  Yes  Handrails on all stairs.  Yes  Good lighting in all hallways.  Yes  Difficulty hearing.  No  Patient counseled about all safety risks that were identified.    Functional Assessment ADLs    Are there any barriers preventing you from cooking for yourself or meeting nutritional needs?  No.    Are there any barriers preventing you from driving safely or obtaining transportation?  No.    Are there any barriers preventing you from using a telephone or calling for help?  No.    Are there any barriers preventing you from shopping?  No.    Are there any barriers preventing you from taking care of your own finances?  No.    Are there any barriers preventing you from managing your medications?  No.    Are you currently engaging any exercise or physical activity?  Yes.  Walking 3-5 times a week. Landscaping and gardening, painting.    Health Maintenance Summary                COLONOSCOPY Overdue 10/25/2001     IMM ZOSTER VACCINE Overdue 10/25/2011     IMM PNEUMOCOCCAL 65+  (ADULT) LOW/MEDIUM RISK SERIES Overdue 10/25/2016     Annual Wellness Visit Overdue 10/25/2016     IMM INFLUENZA Overdue 9/1/2017      Done 10/18/2016 Imm Admin: Influenza Vaccine Quad Inj (Preserved)    IMM DTaP/Tdap/Td Vaccine Next Due 11/18/2024      Done 11/18/2014 Imm Admin: Tdap Vaccine          Patient Care Team:  Radha Fleming M.D. as PCP - General (Family Medicine)  Giuseppe Ramirez M.D. as Consulting Physician (Ophthalmology)    Social History   Substance Use Topics   • Smoking status: Former Smoker     Packs/day: 1.00     Years: 35.00     Types: Cigarettes     Quit date: 10/18/2010   • Smokeless tobacco: Never Used   • Alcohol use 3.6 oz/week     6 Cans of beer per week     Family History   Problem Relation Age of Onset   • Thyroid Mother    • Alzheimer's Disease Father      He  has a past medical history of Cancer (CMS-HCC); Gout; Hyperlipidemia; Hypertension; and Lip injury.   Past Surgical History:   Procedure Laterality Date   • BLADDER BIOPSY WITH CYSTOSCOPY      bladder cancer excision   • MENISCUS REPAIR     • OTHER ORTHOPEDIC SURGERY Left     elbow surgery           Exam:     Blood pressure 128/82, pulse 60, temperature 36.5 °C (97.7 °F), resp. rate 14, height 1.829 m (6'), weight 88.9 kg (196 lb), SpO2 96 %. Body mass index is 26.58 kg/m².    Hearing good.    Dentition good  Alert, oriented in no acute distress.  Eye contact is good, speech goal directed, affect calm  Skin: There is a 1-2mm hypervascular macule on the lower lip, not at the vermillion border    Assessment and Plan. The following treatment and monitoring plan is recommended:      1. Medicare annual wellness visit, initial  HRA reviewed. Pneumonia vaccine given today. Encouraged him to go to the pharmacy for a shingles vaccine. He will also get his pneumonia vaccine in there. He likes to do a fit test rather than colonoscopy. We are trying to get his previous colonoscopy.    2. Need for vaccination  - Pneumococcal  Conjugate Vaccine 13-Valent <6yo IM    3. Screening for malignant neoplasm of colon  - OCCULT BLOOD FECES IMMUNOASSAY (FIT); Future    4. Chronic pain of left knee  Chronic, improved with steroid injection. I will have him back for another steroid injection    5. Idiopathic chronic gout of multiple sites without tophus  Chronic, stable, not on any medications. No recent flares    6. History of bladder cancer  Chronic, stable, following regularly with urology    7. Mixed hyperlipidemia  Chronic, stable on atorvastatin 80mg daily   Recheck labs   - LIPID PROFILE; Future  - COMP METABOLIC PANEL; Future  - atorvastatin (LIPITOR) 80 MG tablet; Take 1 Tab by mouth every evening.  Dispense: 90 Tab; Refill: 3    8. Essential hypertension  Chronic, stable reading today  Recheck q6m   - COMP METABOLIC PANEL; Future  - lisinopril (PRINIVIL) 20 MG Tab; Take 1 Tab by mouth every day.  Dispense: 90 Tab; Refill: 3    9. Impaired fasting glucose  Chronic, recheck labs with A1c  - HEMOGLOBIN A1C; Future      Services suggested: No services needed at this time  Health Care Screening recommendations as per orders if indicated.  Referrals offered: PT/OT/Nutrition counseling/Behavioral Health/Smoking cessation as per orders if indicated.    Discussion today about general wellness and lifestyle habits:    · Prevent falls and reduce trip hazards; Cautioned about securing or removing rugs.  · Have a working fire alarm and carbon monoxide detector;   · Engage in regular physical activity and social activities       Follow-up: Return for knee injection. - need to follow up on the lesion on his lip that is bleeding regularly as well

## 2017-12-29 ENCOUNTER — TELEPHONE (OUTPATIENT)
Dept: MEDICAL GROUP | Facility: LAB | Age: 67
End: 2017-12-29

## 2017-12-29 NOTE — TELEPHONE ENCOUNTER
1. Caller Name: Prieto                                         Call Back Number: [phone]      Patient approves a detailed voicemail message: N\A    Patient says he has had a blood blister on his bottom lip for a couple of months now (Dr. Fleming is aware of this).  This morning he says it popped and was bleeding.  He applied cold compresses and that seemed to help but it started bleeding again when he ate.    He has an appointment on Tuesday to see Dr. Fleming but he is not sure if he can do anything else to help with the bleeding.  Please advise.

## 2017-12-29 NOTE — TELEPHONE ENCOUNTER
LISSA for pt.   Stated I would leave txtr message. If he had question he could reply in there or call.   Mary

## 2017-12-29 NOTE — TELEPHONE ENCOUNTER
Has been using aquaphor and helps keep it from cracking.   Then tried using abreva but that seemed to dry it out and now it's cracked and bleeds with eating or use of mouth.   Has been using cold compression- helps.   Now went back to carmez, aquaphor and vaselin and it's getting better.   Just started using liquid bandage- helped a little but doesn't like the feel.     Discussed with pt that this should get biopsied.   He stated he agreed and would like to get that done when he comes in next on 1/2/18 with his PCP.     I talked to Dr. Fleming and she will do this 1/2/18.

## 2018-01-02 ENCOUNTER — OFFICE VISIT (OUTPATIENT)
Dept: MEDICAL GROUP | Facility: LAB | Age: 68
End: 2018-01-02
Payer: MEDICARE

## 2018-01-02 ENCOUNTER — HOSPITAL ENCOUNTER (OUTPATIENT)
Facility: MEDICAL CENTER | Age: 68
End: 2018-01-02
Attending: FAMILY MEDICINE
Payer: MEDICARE

## 2018-01-02 VITALS
HEIGHT: 72 IN | TEMPERATURE: 98 F | SYSTOLIC BLOOD PRESSURE: 128 MMHG | BODY MASS INDEX: 26.25 KG/M2 | DIASTOLIC BLOOD PRESSURE: 68 MMHG | RESPIRATION RATE: 16 BRPM | WEIGHT: 193.78 LBS | OXYGEN SATURATION: 95 % | HEART RATE: 76 BPM

## 2018-01-02 DIAGNOSIS — L81.9 ATYPICAL PIGMENTED SKIN LESION: ICD-10-CM

## 2018-01-02 DIAGNOSIS — R23.8 OTHER SKIN CHANGES: ICD-10-CM

## 2018-01-02 PROCEDURE — 88305 TISSUE EXAM BY PATHOLOGIST: CPT

## 2018-01-02 PROCEDURE — 11100 PR BIOPSY OF SKIN LESION: CPT | Performed by: FAMILY MEDICINE

## 2018-01-02 PROCEDURE — 99000 SPECIMEN HANDLING OFFICE-LAB: CPT | Performed by: FAMILY MEDICINE

## 2018-01-04 NOTE — PROGRESS NOTES
HPI: Patient has had a lesion on his lower lip over the last 6 months. It does continuously bleed. He has been using Aquaphor which seemed to improve the symptoms of bleeding. He added Abreva to the regimen which caused more bleeding. It does seem to grow and swell at times. He has had sun exposure and burns in the past.    Procedure was described to the patient, and written   informed consent was obtained.    The lesion on the lower lip and surrounding area was given a sterile prep using   chlorhexadine. The area around the lesion was then blocked using 2cc's of 2% lidocaine.     The skin was then stretched perpendicular to the skin tension lines and the lesion removed using the 2mm punch. The resulting ellipse was then closed using 1 interrupted 6-0 Silk suture(s). The wound was dressed using antibiotic ointment. Wound precautions reviewed.

## 2018-01-11 ENCOUNTER — OFFICE VISIT (OUTPATIENT)
Dept: MEDICAL GROUP | Facility: LAB | Age: 68
End: 2018-01-11
Payer: MEDICARE

## 2018-01-11 VITALS
DIASTOLIC BLOOD PRESSURE: 72 MMHG | OXYGEN SATURATION: 95 % | HEART RATE: 77 BPM | RESPIRATION RATE: 14 BRPM | TEMPERATURE: 98.2 F | HEIGHT: 72 IN | SYSTOLIC BLOOD PRESSURE: 120 MMHG | WEIGHT: 193 LBS | BODY MASS INDEX: 26.14 KG/M2

## 2018-01-11 DIAGNOSIS — L81.9 BLEEDING PIGMENTED SKIN LESION: ICD-10-CM

## 2018-01-11 DIAGNOSIS — G89.29 CHRONIC PAIN OF LEFT KNEE: ICD-10-CM

## 2018-01-11 DIAGNOSIS — M25.562 CHRONIC PAIN OF LEFT KNEE: ICD-10-CM

## 2018-01-11 DIAGNOSIS — L98.8: ICD-10-CM

## 2018-01-11 DIAGNOSIS — Z12.11 SCREENING FOR MALIGNANT NEOPLASM OF COLON: ICD-10-CM

## 2018-01-11 PROCEDURE — 99214 OFFICE O/P EST MOD 30 MIN: CPT | Performed by: FAMILY MEDICINE

## 2018-01-12 NOTE — PROGRESS NOTES
"Subjective:   Prieto Holm is a 67 y.o. male here today for   Chief Complaint   Patient presents with   • Suture / Staple Removal     lip       1. Vascular ectasia of skin  2. Bleeding pigmented skin lesion  This is a new diagnosis. Patient had a punch biopsy on his lower lip last week. That visit, the specimen was sent to pathology. This was sent to a dermatopathologist which resulted in a diagnosis of acute inflammation with subepidermal cleft and vascular ectasia. The concern was for a basal cell carcinoma. Patient does not have any other bullous or blister diseases. He has not seen a dermatologist. The lesion has stopped bleeding and is not irritated anymore. The sutures remain  CONSULTANT'S DIAGNOSIS:    \"Skin, lower lip, biopsy ([id number], A1):    - Patchy mucosal acute inflammation with subepidermal cleft and      vascular ectasia (see comment)\"    Please see separate [facility] report for further details.    THERE IS A SCANNED DOCUMENT IN MEDIA    3. Chronic pain of left knee  This is chronic. Patient reports fairly good control of the knee pain. He is interested in an injection at the next visit. He states that he has been walking more and exercising regularly. He denies any swelling, clicking, popping. He does not want any. He has had the injection in the past.    4. Screening for malignant neoplasm of colon  Patient has not yet done his colonoscopy or labs. He does request fit testing    Current medicines (including changes today)  Current Outpatient Prescriptions   Medication Sig Dispense Refill   • lisinopril (PRINIVIL) 20 MG Tab Take 1 Tab by mouth every day. 90 Tab 3   • atorvastatin (LIPITOR) 80 MG tablet Take 1 Tab by mouth every evening. 90 Tab 3   • aspirin EC (ECOTRIN) 81 MG Tablet Delayed Response Take 1 Tab by mouth every day. 30 Tab      No current facility-administered medications for this visit.      He  has a past medical history of Cancer (CMS-HCC); Gout; Hyperlipidemia; " Hypertension; and Lip injury.    ROS   No bleeding, no blisters  Positive for knee pain     Objective:     Blood pressure 120/72, pulse 77, temperature 36.8 °C (98.2 °F), resp. rate 14, height 1.829 m (6'), weight 87.5 kg (193 lb), SpO2 95 %. Body mass index is 26.18 kg/m².   Physical Exam:  General: No acute distress, WN/WD  HEENT: NC/AT, lids normal without lesions, good dentition  Neck: Trachea midline  Cardiovascular: Regular Rate  Pulmonary: No respiratory distress  Skin: Suture in lower lip. Well healing  Neurologic: CN II-XII grossly intact, normal gait  Psych: Alert and oriented x 3, normal insight and judgement        Assessment and Plan:   The following treatment plan was discussed    1. Vascular ectasia of skin  2. Bleeding pigmented skin lesion  This is a new and diagnosis. Dermatopathology reviewed with the patient today. I have recommended consultation with dermatology.  - REFERRAL TO DERMATOLOGY  Sutures were placed by Dr. Tapia on date: 1/2/18  Skin is healed: Yes  Provider notified if skin is not healed, or if there is redness, heat, pain, or drainage from incision: N\A  Sutures removed.   Mastisol and steristips are placed: No    Advised to use emollient (vaseline, aquaphor, etc.) as needed, avoid peroxide and antibiotic ointment to reduce irritation.     Path report has been reviewed by provider.  Path report has reviewed with patient.     3. Chronic pain of left knee  This is chronic and currently stable. We will make a follow-up appointment for a corticosteroid injection into the knee    4. Screening for malignant neoplasm of colon  Fit test has been ordered      Followup: Return in about 10 weeks (around 3/22/2018) for knee injection .       This note was created using voice recognition software. I have made every reasonable attempt to correct errors, however, I do anticipate some grammatical errors.

## 2018-03-15 ENCOUNTER — TELEPHONE (OUTPATIENT)
Dept: MEDICAL GROUP | Facility: LAB | Age: 68
End: 2018-03-15

## 2018-03-15 ENCOUNTER — OFFICE VISIT (OUTPATIENT)
Dept: URGENT CARE | Facility: PHYSICIAN GROUP | Age: 68
End: 2018-03-15
Payer: MEDICARE

## 2018-03-15 VITALS
WEIGHT: 198 LBS | BODY MASS INDEX: 26.82 KG/M2 | OXYGEN SATURATION: 98 % | DIASTOLIC BLOOD PRESSURE: 90 MMHG | RESPIRATION RATE: 16 BRPM | SYSTOLIC BLOOD PRESSURE: 138 MMHG | HEIGHT: 72 IN | TEMPERATURE: 98.2 F | HEART RATE: 69 BPM

## 2018-03-15 DIAGNOSIS — H61.23 BILATERAL IMPACTED CERUMEN: ICD-10-CM

## 2018-03-15 PROCEDURE — 99213 OFFICE O/P EST LOW 20 MIN: CPT | Performed by: PHYSICIAN ASSISTANT

## 2018-03-15 ASSESSMENT — ENCOUNTER SYMPTOMS
MUSCULOSKELETAL NEGATIVE: 1
RESPIRATORY NEGATIVE: 1
CONSTITUTIONAL NEGATIVE: 1
GASTROINTESTINAL NEGATIVE: 1
CARDIOVASCULAR NEGATIVE: 1
PSYCHIATRIC NEGATIVE: 1
EYES NEGATIVE: 1
NEUROLOGICAL NEGATIVE: 1

## 2018-03-15 NOTE — TELEPHONE ENCOUNTER
Tell him not not use anymore qtips. He can try some hydrogen peroxide or olive oil if he has these at home. He can also get over the counter Debrox if this does not help until we can get him in.   Radha Fleming M.D.

## 2018-03-15 NOTE — PROGRESS NOTES
Subjective:      Prieto Holm is a 67 y.o. male who presents with Cerumen Impaction (bi-lateral )            HPI  Chief Complaint   Patient presents with   • Cerumen Impaction     bi-lateral        HPI:  Prieto Holm is a 67 y.o. Male who presents with bilateral ear impaction for the last week.  Hx of cerumen impaction in past.  Loss of hearing.  No pain..  Patient denies HA, SOB, chest pain, palpitations, fever, chills, or n/v/d.      Past Medical History:   Diagnosis Date   • Cancer (CMS-HCC)    • Gout    • Hyperlipidemia    • Hypertension    • Lip injury     feb. 2017       Past Surgical History:   Procedure Laterality Date   • BLADDER BIOPSY WITH CYSTOSCOPY      bladder cancer excision   • MENISCUS REPAIR     • OTHER ORTHOPEDIC SURGERY Left     elbow surgery       Family History   Problem Relation Age of Onset   • Thyroid Mother    • Alzheimer's Disease Father      No pertinent family history.    Social History     Social History   • Marital status:      Spouse name: N/A   • Number of children: N/A   • Years of education: N/A     Occupational History   • Not on file.     Social History Main Topics   • Smoking status: Former Smoker     Packs/day: 1.00     Years: 35.00     Types: Cigarettes     Quit date: 10/18/2010   • Smokeless tobacco: Never Used   • Alcohol use 3.6 oz/week     6 Cans of beer per week   • Drug use: No   • Sexual activity: Yes     Partners: Female     Other Topics Concern   • Not on file     Social History Narrative   • No narrative on file         Current Outpatient Prescriptions:   •  lisinopril, 20 mg, Oral, DAILY, 1/11/2018  •  atorvastatin, 80 mg, Oral, Q EVENING, 1/11/2018  •  aspirin EC, 81 mg, Oral, DAILY, 12/12/2017    No Known Allergies     Review of Systems   Constitutional: Negative.    HENT: Positive for hearing loss.    Eyes: Negative.    Respiratory: Negative.    Cardiovascular: Negative.    Gastrointestinal: Negative.    Genitourinary: Negative.     Musculoskeletal: Negative.    Skin: Negative.    Neurological: Negative.    Endo/Heme/Allergies: Negative.    Psychiatric/Behavioral: Negative.           Objective:     /90   Pulse 69   Temp 36.8 °C (98.2 °F)   Resp 16   Ht 1.829 m (6')   Wt 89.8 kg (198 lb)   SpO2 98%   BMI 26.85 kg/m²      Physical Exam       Nursing note and Vitals Reviewed.    Constitutional:   Appropriately groomed, pleasant affect, well nourished, in NAD.    Head:   Normocephalic, atraumatic.    Eyes:   PERRLA, EOM's full, sclera white, conjunctiva not erythematous, and medial canthus without exudate bilaterally.    Ears:  Auricle and tragus non-tender to manipulation.  No pre-auricular lymphadenopathy or mastoid ttp.  EACs with significant cerumen bilaterally.  TM’s barely visible superior aspect and pearly gray with cone of light present and umbo and malleolus visible bilaterally.  Hearing grossly intact to voice.    Nose:  Nares patent bilaterally.  Nasal mucosa not bilaterally.    Throat:  Dentition wnl, mucosa moist without lesions.  Oropharynx mildly erythematous, with no enlargement of the palatine tonsils bilaterally with no exudates.    No post nasal drainage present.  Soft palate rises symmetrically bilaterally and uvula midline.      Neck: Neck supple, with mild anterior lymphadenopathy that is soft and mobile to palpation. Thyroid non-palpable without tenderness or nodules. No supraclavicular lymphadenopathy.    Lungs:  Respiratory effort not labored without accessory muscle use.     Heart:  RRR, without murmurs rubs or gallops.  Radial and dorsalis pedis pulse 2+ bilaterally.  No LE edema.    Musculoskeletal:  Gait non-antalgic with a narrow base.    Derm:  Skin without rashes or lesions with good turgor pressure.      Psychiatric:  Mood, affect, and judgement appropriate.]    Procedure: Cerumen Removal  Risks and benefits of procedure discussed  Cerumen removed with lavage after softening agent instilled  Patient  tolerated well  Post procedure exam with clear canal and normal TM       Assessment/Plan:   1. Bilateral impacted cerumen    Patient presents with bilateral ear cerumen impaction. Please see procedure note for further details. Successfully lavaged. No evidence of infection.Reviewed etiology of cerumen impaction and how to avoid in the future.    Patient was in agreement with this treatment plan and seemed to understand without barriers. All questions were encouraged and answered.  Reviewed signs and symptoms of when to seek emergency medical care.     Please note that this dictation was created using voice recognition software.  I have made every reasonable attempt to correct obvious errors, but I expect there are errors of amelia and possibly content that I did not discover before finalizing the note.

## 2018-03-15 NOTE — TELEPHONE ENCOUNTER
1. Caller Name: Prieto                                         Call Back Number: [phone]      Patient approves a detailed voicemail message: yes    Patient says he has a wax build up in his left ear.  This morning he took a Qtip and was able to get some of it out.  He also looked online to see what he could try and he decided to use a salt water solution.  He is wondering if there is anything OTC that he can try?    (He wanted to get an appointment with a provider in our office today but there is availability with anybody.)  Please advise.

## 2018-04-19 ENCOUNTER — TELEPHONE (OUTPATIENT)
Dept: MEDICAL GROUP | Facility: LAB | Age: 68
End: 2018-04-19

## 2018-04-19 DIAGNOSIS — M1A.09X0 IDIOPATHIC CHRONIC GOUT OF MULTIPLE SITES WITHOUT TOPHUS: ICD-10-CM

## 2018-04-19 RX ORDER — COLCHICINE 0.6 MG/1
TABLET ORAL
Qty: 3 TAB | Refills: 2 | Status: SHIPPED | OUTPATIENT
Start: 2018-04-19 | End: 2019-08-07

## 2018-04-19 RX ORDER — INDOMETHACIN 25 MG/1
25 CAPSULE ORAL 3 TIMES DAILY PRN
Qty: 90 CAP | Refills: 0 | Status: SHIPPED | OUTPATIENT
Start: 2018-04-19 | End: 2018-04-19 | Stop reason: SDUPTHER

## 2018-04-19 RX ORDER — COLCHICINE 0.6 MG/1
TABLET ORAL
Qty: 3 TAB | Refills: 2 | Status: SHIPPED | OUTPATIENT
Start: 2018-04-19 | End: 2018-04-19 | Stop reason: SDUPTHER

## 2018-04-19 RX ORDER — INDOMETHACIN 25 MG/1
25 CAPSULE ORAL 3 TIMES DAILY PRN
Qty: 90 CAP | Refills: 0 | Status: SHIPPED | OUTPATIENT
Start: 2018-04-19 | End: 2019-08-07

## 2018-04-19 NOTE — TELEPHONE ENCOUNTER
1. Caller Name: Prieto                                         Call Back Number: [phone]      Patient approves a detailed voicemail message: N\A    Patient says he had dental work done early this week.  He is not sure if the medications they used to numb him would cause a gout flare up but the day after his left toe became swollen, red and hot.  He is having a hard time moving around because of this.  He does have a history of gout but it has been a few years since his last episode.  He had some medications from his previous doctor but they have  and is wondering if these can be filled for him.    They are:  *colcrys .6 mg-take 2 pills with gout episode and 1 an hour later  *indomethacin 25 mg-1 cap by mouth with food tid for 15 days  *allopurinol 300 mg-take 1 tab by mouth daily    If this can be done for him he would like to use [facility] on [facility].    Please advise.

## 2018-04-19 NOTE — TELEPHONE ENCOUNTER
I have sent in colcrys and indomethacin. He should not take the allopurinol in this episode because it can make it worse so we will hold off on this for now.    Radha Fleming M.D.

## 2018-04-26 ENCOUNTER — OFFICE VISIT (OUTPATIENT)
Dept: MEDICAL GROUP | Facility: LAB | Age: 68
End: 2018-04-26
Payer: MEDICARE

## 2018-04-26 VITALS
BODY MASS INDEX: 26.68 KG/M2 | RESPIRATION RATE: 16 BRPM | WEIGHT: 197 LBS | HEART RATE: 68 BPM | OXYGEN SATURATION: 97 % | SYSTOLIC BLOOD PRESSURE: 124 MMHG | TEMPERATURE: 98.2 F | DIASTOLIC BLOOD PRESSURE: 88 MMHG | HEIGHT: 72 IN

## 2018-04-26 DIAGNOSIS — M10.072 ACUTE IDIOPATHIC GOUT INVOLVING TOE OF LEFT FOOT: ICD-10-CM

## 2018-04-26 DIAGNOSIS — M1A.09X0 IDIOPATHIC CHRONIC GOUT OF MULTIPLE SITES WITHOUT TOPHUS: ICD-10-CM

## 2018-04-26 DIAGNOSIS — Z12.11 SCREENING FOR MALIGNANT NEOPLASM OF COLON: ICD-10-CM

## 2018-04-26 PROCEDURE — 99214 OFFICE O/P EST MOD 30 MIN: CPT | Performed by: FAMILY MEDICINE

## 2018-04-26 NOTE — PATIENT INSTRUCTIONS
Gout  Gout is painful swelling that can occur in some of your joints. Gout is a type of arthritis. This condition is caused by having too much uric acid in your body. Uric acid is a chemical that forms when your body breaks down substances called purines. Purines are important for building body proteins.  When your body has too much uric acid, sharp crystals can form and build up inside your joints. This causes pain and swelling. Gout attacks can happen quickly and be very painful (acute gout). Over time, the attacks can affect more joints and become more frequent (chronic gout). Gout can also cause uric acid to build up under your skin and inside your kidneys.  What are the causes?  This condition is caused by too much uric acid in your blood. This can occur because:  · Your kidneys do not remove enough uric acid from your blood. This is the most common cause.  · Your body makes too much uric acid. This can occur with some cancers and cancer treatments. It can also occur if your body is breaking down too many red blood cells (hemolytic anemia).  · You eat too many foods that are high in purines. These foods include organ meats and some seafood. Alcohol, especially beer, is also high in purines.  A gout attack may be triggered by trauma or stress.  What increases the risk?  This condition is more likely to develop in people who:  · Have a family history of gout.  · Are male and middle-aged.  · Are female and have gone through menopause.  · Are obese.  · Frequently drink alcohol, especially beer.  · Are dehydrated.  · Lose weight too quickly.  · Have an organ transplant.  · Have lead poisoning.  · Take certain medicines, including aspirin, cyclosporine, diuretics, levodopa, and niacin.  · Have kidney disease or psoriasis.  What are the signs or symptoms?  An attack of acute gout happens quickly. It usually occurs in just one joint. The most common place is the big toe. Attacks often start at night. Other joints that  may be affected include joints of the feet, ankle, knee, fingers, wrist, or elbow. Symptoms may include:  · Severe pain.  · Warmth.  · Swelling.  · Stiffness.  · Tenderness. The affected joint may be very painful to touch.  · Shiny, red, or purple skin.  · Chills and fever.  Chronic gout may cause symptoms more frequently. More joints may be involved. You may also have white or yellow lumps (tophi) on your hands or feet or in other areas near your joints.  How is this diagnosed?  This condition is diagnosed based on your symptoms, medical history, and physical exam. You may have tests, such as:  · Blood tests to measure uric acid levels.  · Removal of joint fluid with a needle (aspiration) to look for uric acid crystals.  · X-rays to look for joint damage.  How is this treated?  Treatment for this condition has two phases: treating an acute attack and preventing future attacks. Acute gout treatment may include medicines to reduce pain and swelling, including:  · NSAIDs.  · Steroids. These are strong anti-inflammatory medicines that can be taken by mouth (orally) or injected into a joint.  · Colchicine. This medicine relieves pain and swelling when it is taken soon after an attack. It can be given orally or through an IV tube.  Preventive treatment may include:  · Daily use of smaller doses of NSAIDs or colchicine.  · Use of a medicine that reduces uric acid levels in your blood.  · Changes to your diet. You may need to see a specialist about healthy eating (dietitian).  Follow these instructions at home:  During a Gout Attack  · If directed, apply ice to the affected area:  ¨ Put ice in a plastic bag.  ¨ Place a towel between your skin and the bag.  ¨ Leave the ice on for 20 minutes, 2-3 times a day.  · Rest the joint as much as possible. If the affected joint is in your leg, you may be given crutches to use.  · Raise (elevate) the affected joint above the level of your heart as often as possible.  · Drink enough  fluids to keep your urine clear or pale yellow.  · Take over-the-counter and prescription medicines only as told by your health care provider.  · Do not drive or operate heavy machinery while taking prescription pain medicine.  · Follow instructions from your health care provider about eating or drinking restrictions.  · Return to your normal activities as told by your health care provider. Ask your health care provider what activities are safe for you.  Avoiding Future Gout Attacks  · Follow a low-purine diet as told by your dietitian or health care provider. Avoid foods and drinks that are high in purines, including liver, kidney, anchovies, asparagus, herring, mushrooms, mussels, and beer.  · Limit alcohol intake to no more than 1 drink a day for nonpregnant women and 2 drinks a day for men. One drink equals 12 oz of beer, 5 oz of wine, or 1½ oz of hard liquor.  · Maintain a healthy weight or lose weight if you are overweight. If you want to lose weight, talk with your health care provider. It is important that you do not lose weight too quickly.  · Start or maintain an exercise program as told by your health care provider.  · Drink enough fluids to keep your urine clear or pale yellow.  · Take over-the-counter and prescription medicines only as told by your health care provider.  · Keep all follow-up visits as told by your health care provider. This is important.  Contact a health care provider if:  · You have another gout attack.  · You continue to have symptoms of a gout attack after10 days of treatment.  · You have side effects from your medicines.  · You have chills or a fever.  · You have burning pain when you urinate.  · You have pain in your lower back or belly.  Get help right away if:  · You have severe or uncontrolled pain.  · You cannot urinate.  This information is not intended to replace advice given to you by your health care provider. Make sure you discuss any questions you have with your health  care provider.  Document Released: 12/15/2001 Document Revised: 05/25/2017 Document Reviewed: 09/29/2016  [employer] Interactive Patient Education © 2017 [employer]

## 2018-04-26 NOTE — PROGRESS NOTES
Subjective:   Prieto Holm is a 67 y.o. male here today for   Chief Complaint   Patient presents with   • Gout       1. Acute idiopathic gout involving toe of left foot  2. Idiopathic chronic gout of multiple sites without tophus  This is any problem to discuss. Patient has had gout in the past but has not had any attacks in the last several years. He used to have prescriptions for indomethacin, colchicine, and allopurinol. He stopped taking allopurinol many years ago. He had a an attack that started about one week ago. Is on the left great toe. He is having swelling, redness, exquisite tenderness. He did try some of the indomethacin and colchicine that he had left at home. He did develop diarrhea from the colchicine. She has had some improvement but has waxed and waned and has not gone away over the course of the week. He has been drinking beer and eating red meat. No fevers, chills, night sweats, fatigue.    Current medicines (including changes today)  Current Outpatient Prescriptions   Medication Sig Dispense Refill   • lisinopril (PRINIVIL) 20 MG Tab Take 1 Tab by mouth every day. 90 Tab 3   • atorvastatin (LIPITOR) 80 MG tablet Take 1 Tab by mouth every evening. 90 Tab 3   • colchicine (COLCRYS) 0.6 MG Tab Take 2 tabs PO once then 1 tab PO 1 hour later prn gout attack 3 Tab 2   • indomethacin (INDOCIN) 25 MG Cap Take 1 Cap by mouth 3 times a day as needed (gout). 90 Cap 0   • aspirin EC (ECOTRIN) 81 MG Tablet Delayed Response Take 1 Tab by mouth every day. 30 Tab      No current facility-administered medications for this visit.      He  has a past medical history of Cancer ([facility]); Gout; Hyperlipidemia; Hypertension; and Lip injury.    ROS   No fevers  No bowel changes  No LE edema       Objective:     Blood pressure 124/88, pulse 68, temperature 36.8 °C (98.2 °F), resp. rate 16, height 1.829 m (6'), weight 89.4 kg (197 lb), SpO2 97 %. Body mass index is 26.72 kg/m².   Physical Exam:  General: No  acute distress, WN/WD  HEENT: NC/AT, lids normal without lesions, good dentition  Neck: Trachea midline  Cardiovascular: Regular Rate  Pulmonary: No respiratory distress  Skin: No rashes noted  Neurologic: CN II-XII grossly intact, normal gait  Psych: Alert and oriented x 3, normal insight and judgement  MSK: Left foot examined. There is erythema and tenderness to palpation at the great toe MTP joint    Assessment and Plan:   The following treatment plan was discussed    1. Acute idiopathic gout involving toe of left foot  2. Idiopathic chronic gout of multiple sites without tophus  This is a new problem, uncontrolled.    we will start scheduled indomethacin 3 times a day with food 50 mg. Discussed risks of this medication. We will hold off on whether just seen given the side effects. After he is done with this medication, we will consider allopurinol. We discussed that he can use Tylenol with the indomethacin if he needs extra support    3. Screening for malignant neoplasm of colon  - COLOGUARD (FIT DNA)      Followup: Return in about 2 weeks (around 5/10/2018), or if symptoms worsen or fail to improve.       This note was created using voice recognition software. I have made every reasonable attempt to correct errors, however, I do anticipate some grammatical errors.

## 2018-05-14 ENCOUNTER — HOSPITAL ENCOUNTER (OUTPATIENT)
Dept: LAB | Facility: MEDICAL CENTER | Age: 68
End: 2018-05-14
Attending: FAMILY MEDICINE
Payer: MEDICARE

## 2018-05-14 DIAGNOSIS — R73.01 IMPAIRED FASTING GLUCOSE: ICD-10-CM

## 2018-05-14 DIAGNOSIS — I10 ESSENTIAL HYPERTENSION: ICD-10-CM

## 2018-05-14 DIAGNOSIS — E78.2 MIXED HYPERLIPIDEMIA: ICD-10-CM

## 2018-05-14 LAB
ALBUMIN SERPL BCP-MCNC: 3.9 G/DL (ref 3.2–4.9)
ALBUMIN/GLOB SERPL: 1.7 G/DL
ALP SERPL-CCNC: 60 U/L (ref 30–99)
ALT SERPL-CCNC: 17 U/L (ref 2–50)
ANION GAP SERPL CALC-SCNC: 8 MMOL/L (ref 0–11.9)
AST SERPL-CCNC: 15 U/L (ref 12–45)
BILIRUB SERPL-MCNC: 0.8 MG/DL (ref 0.1–1.5)
BUN SERPL-MCNC: 12 MG/DL (ref 8–22)
CALCIUM SERPL-MCNC: 9.9 MG/DL (ref 8.5–10.5)
CHLORIDE SERPL-SCNC: 105 MMOL/L (ref 96–112)
CHOLEST SERPL-MCNC: 127 MG/DL (ref 100–199)
CO2 SERPL-SCNC: 25 MMOL/L (ref 20–33)
CREAT SERPL-MCNC: 0.87 MG/DL (ref 0.5–1.4)
EST. AVERAGE GLUCOSE BLD GHB EST-MCNC: 126 MG/DL
GLOBULIN SER CALC-MCNC: 2.3 G/DL (ref 1.9–3.5)
GLUCOSE SERPL-MCNC: 110 MG/DL (ref 65–99)
HBA1C MFR BLD: 6 % (ref 0–5.6)
HDLC SERPL-MCNC: 48 MG/DL
LDLC SERPL CALC-MCNC: 67 MG/DL
POTASSIUM SERPL-SCNC: 4.6 MMOL/L (ref 3.6–5.5)
PROT SERPL-MCNC: 6.2 G/DL (ref 6–8.2)
SODIUM SERPL-SCNC: 138 MMOL/L (ref 135–145)
TRIGL SERPL-MCNC: 61 MG/DL (ref 0–149)

## 2018-05-14 PROCEDURE — 80061 LIPID PANEL: CPT

## 2018-05-14 PROCEDURE — 80053 COMPREHEN METABOLIC PANEL: CPT

## 2018-05-14 PROCEDURE — 83036 HEMOGLOBIN GLYCOSYLATED A1C: CPT

## 2018-05-14 PROCEDURE — 36415 COLL VENOUS BLD VENIPUNCTURE: CPT

## 2018-05-16 ENCOUNTER — OFFICE VISIT (OUTPATIENT)
Dept: BEHAVIORAL HEALTH | Facility: PHYSICIAN GROUP | Age: 68
End: 2018-05-16
Payer: MEDICARE

## 2018-05-16 ENCOUNTER — OFFICE VISIT (OUTPATIENT)
Dept: MEDICAL GROUP | Facility: LAB | Age: 68
End: 2018-05-16
Payer: MEDICARE

## 2018-05-16 VITALS
OXYGEN SATURATION: 98 % | WEIGHT: 188 LBS | DIASTOLIC BLOOD PRESSURE: 72 MMHG | SYSTOLIC BLOOD PRESSURE: 108 MMHG | HEART RATE: 72 BPM | HEIGHT: 72 IN | RESPIRATION RATE: 12 BRPM | TEMPERATURE: 97.9 F | BODY MASS INDEX: 25.47 KG/M2

## 2018-05-16 DIAGNOSIS — G89.29 CHRONIC PAIN OF LEFT KNEE: ICD-10-CM

## 2018-05-16 DIAGNOSIS — R73.01 IMPAIRED FASTING GLUCOSE: ICD-10-CM

## 2018-05-16 DIAGNOSIS — M25.562 CHRONIC PAIN OF LEFT KNEE: ICD-10-CM

## 2018-05-16 DIAGNOSIS — Z63.0 PARTNER RELATIONSHIP PROBLEM: ICD-10-CM

## 2018-05-16 DIAGNOSIS — E78.2 MIXED HYPERLIPIDEMIA: ICD-10-CM

## 2018-05-16 DIAGNOSIS — F34.1 DYSTHYMIA: ICD-10-CM

## 2018-05-16 DIAGNOSIS — F51.04 PSYCHOPHYSIOLOGICAL INSOMNIA: ICD-10-CM

## 2018-05-16 PROCEDURE — 20610 DRAIN/INJ JOINT/BURSA W/O US: CPT | Performed by: FAMILY MEDICINE

## 2018-05-16 PROCEDURE — 90791 PSYCH DIAGNOSTIC EVALUATION: CPT | Performed by: PSYCHOLOGIST

## 2018-05-16 PROCEDURE — 99214 OFFICE O/P EST MOD 30 MIN: CPT | Mod: 25 | Performed by: FAMILY MEDICINE

## 2018-05-16 SDOH — SOCIAL STABILITY - SOCIAL INSECURITY: PROBLEMS IN RELATIONSHIP WITH SPOUSE OR PARTNER: Z63.0
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
## 2018-05-16 NOTE — PATIENT INSTRUCTIONS
Prediabetes  Prediabetes is the condition of having a blood sugar (blood glucose) level that is higher than it should be, but not high enough for you to be diagnosed with type 2 diabetes. Having prediabetes puts you at risk for developing type 2 diabetes (type 2 diabetes mellitus). Prediabetes may be called impaired glucose tolerance or impaired fasting glucose.  Prediabetes usually does not cause symptoms. Your health care provider can diagnose this condition with blood tests. You may be tested for prediabetes if you are overweight and if you have at least one other risk factor for prediabetes.  Risk factors for prediabetes include:  · Having a family member with type 2 diabetes.  · Being overweight or obese.  · Being older than age 45.  · Being of American-, -American, /, or / descent.  · Having an inactive (sedentary) lifestyle.  · Having a history of gestational diabetes or polycystic ovarian syndrome (PCOS).  · Having low levels of good cholesterol (HDL-C) or high levels of blood fats (triglycerides).  · Having high blood pressure.  What is blood glucose and how is blood glucose measured?     Blood glucose refers to the amount of glucose in your bloodstream. Glucose comes from eating foods that contain sugars and starches (carbohydrates) that the body breaks down into glucose. Your blood glucose level may be measured in mg/dL (milligrams per deciliter) or mmol/L (millimoles per liter). Your blood glucose may be checked with one or more of the following blood tests:  · A fasting blood glucose (FBG) test. You will not be allowed to eat (you will fast) for at least 8 hours before a blood sample is taken.  ¨ A normal range for FBG is  mg/dl (3.9-5.6 mmol/L).  · An A1c (hemoglobin A1c) blood test. This test provides information about blood glucose control over the previous 2?3 months.  · An oral glucose tolerance test (OGTT). This test measures your blood glucose  twice:  ¨ After fasting. This is your baseline level.  ¨ Two hours after you drink a beverage that contains glucose.  You may be diagnosed with prediabetes:  · If your FBG is 100?125 mg/dL (5.6-6.9 mmol/L).  · If your A1c level is 5.7?6.4%.  · If your OGGT result is 140?199 mg/dL (7.8-11 mmol/L).  These blood tests may be repeated to confirm your diagnosis.  What happens if blood glucose is too high?  The pancreas produces a hormone (insulin) that helps move glucose from the bloodstream into cells. When cells in the body do not respond properly to insulin that the body makes (insulin resistance), excess glucose builds up in the blood instead of going into cells. As a result, high blood glucose (hyperglycemia) can develop, which can cause many complications. This is a symptom of prediabetes.  What can happen if blood glucose stays higher than normal for a long time?  Having high blood glucose for a long time is dangerous. Too much glucose in your blood can damage your nerves and blood vessels. Long-term damage can lead to complications from diabetes, which may include:  · Heart disease.  · Stroke.  · Blindness.  · Kidney disease.  · Depression.  · Poor circulation in the feet and legs, which could lead to surgical removal (amputation) in severe cases.  How can prediabetes be prevented from turning into type 2 diabetes?     To help prevent type 2 diabetes, take the following actions:  · Be physically active.  ¨ Do moderate-intensity physical activity for at least 30 minutes on at least 5 days of the week, or as much as told by your health care provider. This could be brisk walking, biking, or water aerobics.  ¨ Ask your health care provider what activities are safe for you. A mix of physical activities may be best, such as walking, swimming, cycling, and strength training.  · Lose weight as told by your health care provider.  ¨ Losing 5-7% of your body weight can reverse insulin resistance.  ¨ Your health care  provider can determine how much weight loss is best for you and can help you lose weight safely.  · Follow a healthy meal plan. This includes eating lean proteins, complex carbohydrates, fresh fruits and vegetables, low-fat dairy products, and healthy fats.  ¨ Follow instructions from your health care provider about eating or drinking restrictions.  ¨ Make an appointment to see a diet and nutrition specialist (registered dietitian) to help you create a healthy eating plan that is right for you.  · Do not smoke or use any tobacco products, such as cigarettes, chewing tobacco, and e-cigarettes. If you need help quitting, ask your health care provider.  · Take over-the-counter and prescription medicines as told by your health care provider. You may be prescribed medicines that help lower the risk of type 2 diabetes.  This information is not intended to replace advice given to you by your health care provider. Make sure you discuss any questions you have with your health care provider.  Document Released: 04/10/2017 Document Revised: 05/25/2017 Document Reviewed: 02/07/2017  [employer] Patient Education © 2017 [employer]

## 2018-05-16 NOTE — PATIENT INSTRUCTIONS
Good Sleep Hygiene Handout  The most common cause of insomnia is a change in your daily routine. For example, traveling, change in work hours, disruption of other behaviors (eating, exercise, leisure, etc.), and relationship conflicts can all cause sleep problems. Paying attention to good sleep hygiene is the most important thing you can do to maintain good sleep.   Do:   1. Go to bed at the same time each day.   2. Get up from bed at the same time each day. Try to maintain a time close to this on weekends.   3. Get regular exercise each day, preferably in the morning. There is good evidence that regular exercise improves restful sleep. This includes stretching and aerobic exercise.   4. Get regular exposure to outdoor or bright lights, especially in the late afternoon.   5. Keep the temperature in your bedroom comfortable.   6. Keep the bedroom quiet when sleeping.   7. Keep the bedroom dark enough to facilitate sleep.   8. Use your bed only for sleep and sexual activity. This will help you associate your bed with sleep, not with other activities like paying bills, talking on the phone, watching TV.   9. Establish a regular, relaxing bedtime routine. Relaxing rituals prior to bedtime may include a warm bath or shower, aromatherapy, reading, or listening to soothing music.   10. Use a relaxation exercise just before going to sleep or use relaxing imagery. Even if you don’t fall asleep, this will allow your body to rest and feel relaxed.   11. Keep your feet and hands warm. Wear warm socks to bed.   12. Designate another time to write down problems & possible solutions, instead focus on the things that you are grateful for and positive things that happen in the day.   Don't:   1. Exercise just before going to bed. Try to keep it no closer than 3-4 hours before bed.   2. Engage in stimulating activity just before bed, such as playing a competitive game, watching an exciting program on television or movie, or having an  important discussion with a loved one.   3. Have caffeine in the evening (coffee, many teas, chocolate, sodas, etc.)   4. Read or watch television in bed.   5. Use alcohol to help you sleep. It actually interrupts your sleep cycle.   6. Go to bed too hungry or too full.   7. Take another person's sleeping pills.   8. Take over-the-counter sleeping pills, without your doctor's knowledge. Tolerance can develop rapidly with these medications.   9. Take daytime naps. If you do, keep them to no more than 20 minutes and not close to bedtime.   10. Command yourself to go to sleep. This only makes your mind and body more alert.   11. Watch the clock or count minutes; this usually causes more anxiety, which keeps you up.   12. Lie in bed awake for more than 20-30 minutes. Instead, get up, go to a different room (or different part of the bedroom), participate in a quiet activity (e.g. non-excitable reading), and then return to bed when you feel sleepy. Do not turn on lights or sit in front of a bright TV or computer, this will stimulate your brain to wake up. Stay in a dark, quiet place. Do this as many times during the night as needed.   13. Succumb to maladaptive thoughts like: “Oh no, look how late it is, I’ll never get to sleep” or “I must have eight hours of sleep each night, if I get less than eight hours of sleep I will get sick.” Challenge your concerns and avoid catastrophizing. Remember that we cannot fully control our sleep process. Trying too hard to control it will make you more tense and more awake.   14. Change your daytime routine the next day if you didn’t sleep well. Even if you have a bad night sleep and are tired it is important that you try to keep your daytime activities the same as you had planned. That is, don’t avoid activities or stay in bed late because you feel tired. This can reinforce the insomnia.   15. Increase caffeine intakes the next day, this can keep you up again the following night.

## 2018-05-16 NOTE — PROGRESS NOTES
Subjective:   Prieto Holm is a 67 y.o. male here today for   Chief Complaint   Patient presents with   • Knee Pain   • Results     labs       1. Impaired fasting glucose  This is a chronic problem. Patient has had slightly elevated sugar levels in the past. He now has a hemoglobin A1c of 6.0%. He does report increased life stress. He does not drink sugary drinks or have many sweets. He does have 2 beers daily. He is eating starches, carbohydrates. Denies neuropathy, polyuria, polydipsia  Results for PRIETO HOLM (MRN [id number]) as of 5/16/2018 14:55   Ref. Range 5/14/2018 06:06   Sodium Latest Ref Range: 135 - 145 mmol/L 138   Potassium Latest Ref Range: 3.6 - 5.5 mmol/L 4.6   Chloride Latest Ref Range: 96 - 112 mmol/L 105   Co2 Latest Ref Range: 20 - 33 mmol/L 25   Anion Gap Latest Ref Range: 0.0 - 11.9  8.0   Glucose Latest Ref Range: 65 - 99 mg/dL 110 (H)   Bun Latest Ref Range: 8 - 22 mg/dL 12   Creatinine Latest Ref Range: 0.50 - 1.40 mg/dL 0.87   GFR If  Latest Ref Range: >60 mL/min/1.73 m 2 >60   GFR If Non  Latest Ref Range: >60 mL/min/1.73 m 2 >60   Calcium Latest Ref Range: 8.5 - 10.5 mg/dL 9.9   AST(SGOT) Latest Ref Range: 12 - 45 U/L 15   ALT(SGPT) Latest Ref Range: 2 - 50 U/L 17   Alkaline Phosphatase Latest Ref Range: 30 - 99 U/L 60   Total Bilirubin Latest Ref Range: 0.1 - 1.5 mg/dL 0.8   Albumin Latest Ref Range: 3.2 - 4.9 g/dL 3.9   Total Protein Latest Ref Range: 6.0 - 8.2 g/dL 6.2   Globulin Latest Ref Range: 1.9 - 3.5 g/dL 2.3   A-G Ratio Latest Units: g/dL 1.7   Glycohemoglobin Latest Ref Range: 0.0 - 5.6 % 6.0 (H)   Estim. Avg Glu Latest Units: mg/dL 126     2. Mixed hyperlipidemia  This is chronic. Doing well.  Taking atorvastatin 80 mg daily as prescribed. No myalgias, GI upset, or other side effects from medication. Denies CP or stroke symptoms. Also taking a daily ASA. Last lipid panel 5/2018 and was reviewed with the patient.     Results  for YAZMIN JERRY (MRN [id number]) as of 5/16/2018 14:55   Ref. Range 5/14/2018 06:06   Cholesterol,Tot Latest Ref Range: 100 - 199 mg/dL 127   Triglycerides Latest Ref Range: 0 - 149 mg/dL 61   HDL Latest Ref Range: >=40 mg/dL 48   LDL Latest Ref Range: <100 mg/dL 67     3. Psychophysiological insomnia  This is a new problem to discuss. Patient has had on and off issues with sleep. He states that over the last couple of months this has significantly worsened. He has gone through a lot of life stressors including his mother with early stages of dementia, a divorce but is pending with his brother,    4. Chronic pain of left knee  This is chronic. Patient was told that he likely has meniscal injury. He has had arthroscopy on both knees. He likes to play basketball with his grandsons and this does aggravate it some. He tries to walk at least 3 miles a day, often more. He is not taking anything over-the-counter for this on most days. Occasionally, he will take NSAIDs. His last joint injection was over one year ago        Current medicines (including changes today)  Current Outpatient Prescriptions   Medication Sig Dispense Refill   • lisinopril (PRINIVIL) 20 MG Tab Take 1 Tab by mouth every day. 90 Tab 3   • atorvastatin (LIPITOR) 80 MG tablet Take 1 Tab by mouth every evening. 90 Tab 3   • colchicine (COLCRYS) 0.6 MG Tab Take 2 tabs PO once then 1 tab PO 1 hour later prn gout attack 3 Tab 2   • indomethacin (INDOCIN) 25 MG Cap Take 1 Cap by mouth 3 times a day as needed (gout). 90 Cap 0   • aspirin EC (ECOTRIN) 81 MG Tablet Delayed Response Take 1 Tab by mouth every day. 30 Tab      No current facility-administered medications for this visit.      He  has a past medical history of Cancer (HCC); Gout; Hyperlipidemia; Hypertension; and Lip injury.    ROS   No fevers  No bowel changes  No LE edema       Objective:     Blood pressure 108/72, pulse 72, temperature 36.6 °C (97.9 °F), resp. rate 12, height 1.829 m (6'),  weight 85.3 kg (188 lb), SpO2 98 %. Body mass index is 25.5 kg/m².   Physical Exam:  Constitutional: Alert, no distress.  Skin: Warm, dry, good turgor, no rashes in visible areas.  Eye: Equal, round and reactive, conjunctiva clear, lids normal.  ENMT: Lips without lesions, good dentition, oropharynx clear.  Neck: Trachea midline, no masses, no thyromegaly. No cervical or supraclavicular lymphadenopathy  Respiratory: Unlabored respiratory effort, lungs clear to auscultation, no wheezes, no ronchi.  Cardiovascular: Normal S1, S2, RRR, no murmur, no edema.  Abdomen: Soft, non-tender, no masses, no hepatosplenomegaly.  Psych: Alert and oriented x3, normal affect and mood.      Assessment and Plan:   The following treatment plan was discussed    1. Impaired fasting glucose  Chronic, worsening. Discussed at length dietary modifications and exercise. Handouts given today. We will recheck in 4 months  - BASIC METABOLIC PANEL; Future  - HEMOGLOBIN A1C; Future    2. Mixed hyperlipidemia  This is chronic, stable on atorvastatin 80 mg daily    3. Psychophysiological insomnia  This is a new problem, likely due to increased life stressors. Discussed mindfulness, applications for his phone, relaxation techniques, sleep hygiene. Gave him options for over-the-counter magnesium and melatonin to start with. Patient understands    4. Chronic pain of left knee  - Consent for Amb Surgery/Procedure  - Consent for Amb Surgery/Procedure    DIAGNOSIS:  Chronic knee pain with arthritis    INDICATIONS:  Pain    Informed consent was obtained. We discussed the risks and benefits including bleeding, infection, fat necrosis, pain, no improvement current pain    The joint was prepped with chlorhexadine and anesthetized with a topical spray. A 22 guage needle was inserted into the superior infrapatellar aspect of the joint from a lateral approach.  1 cc of 2% xylocaine, 2 cc of bupivicaine, and 1 cc of kenalog 40 was then injected into the joint.  The area cleansed and dressed with a bandaid.    Followup: Return in about 4 months (around 9/16/2018) for prediabetes.       This note was created using voice recognition software. I have made every reasonable attempt to correct errors, however, I do anticipate some grammatical errors.

## 2018-05-23 ENCOUNTER — OFFICE VISIT (OUTPATIENT)
Dept: BEHAVIORAL HEALTH | Facility: PHYSICIAN GROUP | Age: 68
End: 2018-05-23
Payer: MEDICARE

## 2018-05-23 DIAGNOSIS — F34.1 DYSTHYMIA: ICD-10-CM

## 2018-05-23 DIAGNOSIS — F32.1 CURRENT MODERATE EPISODE OF MAJOR DEPRESSIVE DISORDER WITHOUT PRIOR EPISODE (HCC): ICD-10-CM

## 2018-05-23 DIAGNOSIS — Z63.0 PARTNER RELATIONSHIP PROBLEM: ICD-10-CM

## 2018-05-23 PROCEDURE — 90834 PSYTX W PT 45 MINUTES: CPT | Performed by: PSYCHOLOGIST

## 2018-05-23 SDOH — SOCIAL STABILITY - SOCIAL INSECURITY: PROBLEMS IN RELATIONSHIP WITH SPOUSE OR PARTNER: Z63.0

## 2018-05-23 NOTE — PATIENT INSTRUCTIONS
Patient will report at least six hours of restful sleep each night    Patient will eat at least two out of three meals a day including meat, cheese, nuts, breads and pasta    Patient will drink 6-8 glasses of water per day to the point his urine in clear not yellow.    Good Sleep Hygiene Handout    The most common cause of insomnia is a change in your daily routine. For example, traveling, change in work hours, disruption of other behaviors (eating, exercise, leisure, etc.), and relationship conflicts can all cause sleep problems. Paying attention to good sleep hygiene is the most important thing you can do to maintain good sleep.   Do:   1. Go to bed at the same time each day.   2. Get up from bed at the same time each day. Try to maintain a time close to this on weekends.   3. Get regular exercise each day, preferably in the morning. There is good evidence that regular exercise improves restful sleep. This includes stretching and aerobic exercise.   4. Get regular exposure to outdoor or bright lights, especially in the late afternoon.   5. Keep the temperature in your bedroom comfortable.   6. Keep the bedroom quiet when sleeping.   7. Keep the bedroom dark enough to facilitate sleep.   8. Use your bed only for sleep and sexual activity. This will help you associate your bed with sleep, not with other activities like paying bills, talking on the phone, watching TV.   9. Establish a regular, relaxing bedtime routine. Relaxing rituals prior to bedtime may include a warm bath or shower, aromatherapy, reading, or listening to soothing music.   10. Use a relaxation exercise just before going to sleep or use relaxing imagery. Even if you don’t fall asleep, this will allow your body to rest and feel relaxed.   11. Keep your feet and hands warm. Wear warm socks to bed.   12. Designate another time to write down problems & possible solutions, instead focus on the things that you are grateful for and positive things that  happen in the day.     Don't:   1. Exercise just before going to bed. Try to keep it no closer than 3-4 hours before bed.   2. Engage in stimulating activity just before bed, such as playing a competitive game, watching an exciting program on television or movie, or having an important discussion with a loved one.   3. Have caffeine in the evening (coffee, many teas, chocolate, sodas, etc.)   4. Read or watch television in bed.   5. Use alcohol to help you sleep. It actually interrupts your sleep cycle.   6. Go to bed too hungry or too full.   7. Take another person's sleeping pills.   8. Take over-the-counter sleeping pills, without your doctor's knowledge. Tolerance can develop rapidly with these medications.   9. Take daytime naps. If you do, keep them to no more than 20 minutes and not close to bedtime.   10. Command yourself to go to sleep. This only makes your mind and body more alert.   11. Watch the clock or count minutes; this usually causes more anxiety, which keeps you up.   12. Lie in bed awake for more than 20-30 minutes. Instead, get up, go to a different room (or different part of the bedroom), participate in a quiet activity (e.g. non-excitable reading), and then return to bed when you feel sleepy. Do not turn on lights or sit in front of a bright TV or computer, this will stimulate your brain to wake up. Stay in a dark, quiet place. Do this as many times during the night as needed.   13. Succumb to maladaptive thoughts like: “Oh no, look how late it is, I’ll never get to sleep” or “I must have eight hours of sleep each night, if I get less than eight hours of sleep I will get sick.” Challenge your concerns and avoid catastrophizing. Remember that we cannot fully control our sleep process. Trying too hard to control it will make you more tense and more awake.   14. Change your daytime routine the next day if you didn’t sleep well. Even if you have a bad night sleep and are tired it is important that  you try to keep your daytime activities the same as you had planned. That is, don’t avoid activities or stay in bed late because you feel tired. This can reinforce the insomnia.   15. Increase caffeine intakes the next day, this can keep you up again the following night.

## 2018-05-23 NOTE — BH THERAPY
[facility]  Crisis/Safety Plan    Name:  Prieto Holm  MRN:  [id number]  Date:  2018    Warning signs that a crisis may be developing for me or I may be at risk:  1) Increase thought about death  2) Thinking about my relationship with my wife      Coping strategies I can use on my own (relaxation, physical activity, etc):  1) Take a walk  2) Call friend or granddaughter      Ways I can make my environment safe:  1) No use of alcohol to the [facility] evaluation on Friday      Things I want to tell myself when I feel a crisis developin) I'm a goof person  2) I have been a hard worker      People I can contact for support or distraction (and their phone numbers):  1) Granddaughter [phone]  2) Granddaughter [phone]  3) Step-daughter [phone]    If I’m not able to reach my support people, or the above strategies don’t help, I can contact the following professionals, agencies, or hotlines:  1) Crisis Call Center ():  1-540.214.6356 -OR- ([phone] [phone]  2) Crisis Text Line ():  Text START to 793522  3) Reviewed emergency resources with the patient and the patient expressed understanding including:  If feeling suicidal, patient will call or present to the [facility] during duty hours or present to closest ED ([facility] or [facility], or call 911    Henok Aguilar III, Ed.D.      [facility]  Therapy Progress Note    Patient Name: Prieto Holm  Patient MRN: [id number]  Today's Date: 2018     Type of session:Individual psychotherapy  Length of session: 50 minutes  Persons in attendance:Patient    Subjective/New Info: The patient ID/Chief Complaint: The patient is a 67 year old male, , . The patient self-referred and voluntarily presented for an individual intake to address chronic depression and recent separation with thought of suicide without specific plan or intent.  Reviewed limits of confidentiality and [facility] policies; patient expressed understanding and agreed to voluntarily proceed with evaluation and treatment.    Interval History:   Session Focus: The patient reported increased suicidal ideation without a specific planned or intent. He also reported a number of vegetative symptoms including only getting one small meal per day, decreased hydration, decreased energy, and sleep problems.       Therapeutic Intervention: Cognitive Behavioral Therapy    Planned Intervention: Discussed with the patient the pros and cons of continued outpatient psychotherapy versus partial hospitalization. The patient is willing to undergo an evaluation for partial hospitalization that will be conducted on May 24 and will be seen by psychiatrist on May 25.  A safety plan was developed and provided to the patient he was educated about the use of the suicide crisis hotline and the availability of mental health services at the emergency department and he agreed to use the services with worsening suicidal ideation or if he starts developing a plan.     Objective/Observations:  Patient did not present in acute distress. Patient was appropriately groomed. Patient was alert and oriented x4. Eye contact was appropriate. No abnormalities in attention or concentration were noted. No abnormalities of movement present; psychomotor activity was normal. Speech was fluent and regular in rhythm, rate, volume, and tone. Thought processes were linear, logical, and goal-directed. There was no evidence of thought disorder. No auditory or visual hallucinations. Long and short term memory appeared to be intact. Insight, judgment, and impulse control were deemed to be fair. Reported mood was “depressed.” Affect was full-ranging and appropriate to thought content and conversation. Patient reports current suicidal ideation without plan, intent, or preparatory and homicidal ideation in plan,  intent, and preparatory behavior.     RESULTS OF SCREENING MEASURES:  Psychometric Test Results:                                                                                                               [id number]  Global Mental Health  Severe Distress  Well Being Scale                     Severe Distress  Symptoms Scale                                 Severe Distress  Anxiety Subscale                    Severe Distress  Depression Subscale              Severe Distress  Alcohol/Drug Subscale           Severe Distress  Bipolar Subscale                     Moderate Distress  Eating Disorder Subscale       Moderate Distress  Harm to other Subscale          Severe Distress  Suicide Monitoring Scale Moderate Risk  Life Functioning Scale            Severe Distress   Diagnoses:   1. Dysthymia    2. Partner relationship problem    3. Current moderate episode of major depressive disorder without prior episode (HCC)     Risk Assessment:      The patient denied any suicide-related ideation and/or behaviors and intent/plan, denied thoughts about death and dying both in session and during the period since last appointment, or past 2 weeks.     Current Risk and Protective Factors:  Psychiatric History and Current Status:   ?history of suicide attempt < 3 months;   ?history of suicide attempt   ?history of psychiatric inpatient care;   ?history of non-suicidal self-injurious behaviors;   ?depression or other mood disorders;   ?personality disorders or traits;   ?PTSD or anxiety disorders;   ?sleep disorders;   ?substance-use disorders;   ?family history of suicide - not applicable.;  ?family history of psychiatric illness;   ?psychotic disorders.  Psychological Characteristics:   ?hopelessness;   ?belongingness;   ?perceived burdensomeness;   ?acquired capability for suicide;   ?impulsivity;   ?problem-solving deficits;   ?shame;   ?guilt.   Psychosocial Stressors:   ?relationship problems;   ?legal problems;   ?financial  problems;   ?work related problems;   ?lack of social support.  Physical Injury or Illness:   ?TBI;   ?physical injury;   ?chronic pain;   ?other medical problems…     Other Risk Factors:   ?male;   ? ;   ?access to lethal means;   ?combat exposure;   ?history of physical, emotional, mental and or sexual abuse;   ?sexual orientation;   ?mental health stigma and perceived barriers to care;   ?recent local cluster of suicides (consider possible contagion)      Current Protective Factors:   Psychiatric History and Status:   ?compliance with psychiatric medication;   ?engagement in evidenced-based treatment;   ?motivation and readiness to change;   ?insight about problems.  Psychological Characteristics:   ?problem solving and effective coping strategies;   ?resilience;   ?reasons for living;   ?future orientation;   ?perceived internal locus of control.     Psychosocial Factors:   ?healthy intimate relationships;   ?social support and community involvement.      Physical Injury or Illness:   ?medical compliance;   ?able to access care as needed;   ?support for help seeking.      Other Protective Factors:   ?restricted access to lethal means;   ?Episcopalian/spirituality,   ?crisis response or other related training.     Risk Level: Currently at Clinically Significant Suicide Risk But Not Imminent. Hospitalization is not deemed necessary at this time as the patient does not present a clear or imminent danger to self or others. Pursuing higher level of care as outpatient management is currently not most appropriate or least restrictive level of care.    Current risk:   SUICIDE: Moderate   Homicide: Not applicable   Self-harm: Low   Relapse: Low   Other:    Safety Plan reviewed? Yes   If evidence of imminent risk is present, intervention/plan:       Treatment Goal(s)/Objective(s) addressed:   Goal: Improve overall mood   Be free of suicidal thoughts   Call crisis hotline if having suicidal thoughts   Develop  strategies for thought distraction when ruminating on the past  Increase understand of automatic thoughts related to depression  Identify depressive schema and associated automatic thought and develop skills to challenge them.     Progress toward Treatment Goals: Significant decline    Plan:  - Next appointment scheduled:  5/29/2018    1) The patient will return to the clinic 1 week but with PHP evaluation tomorrow.  2) Crisis Response Plan:  Reviewed emergency resources with the patient and the patient expressed understanding including:  If feeling suicidal, patient will call or present to the Behavioral Health Clinic during duty hours or present to closest ED ([facility] or [facility], call 911 or crisis hotline (0-585-699-QLJD) after duty hours.  3) Referrals/Consults:  PHP  4) Barriers to Learning:  No  5) Readiness to Learn:  Yes  6) Cultural Concerns:  No  7) Patient voiced understanding of, and agreement with, plan and goals as annotated above.  8) Declare these services are medically necessary and appropriate to the patient’s diagnosis and needs  9) The point of contact at the [facility]al Health Clinic regarding this evaluation is Dr. Aguilar, Psychologist.    Henok Aguilar III, Ed.D.  5/23/2018

## 2018-05-24 ENCOUNTER — HOSPITAL ENCOUNTER (OUTPATIENT)
Dept: BEHAVIORAL HEALTH | Facility: MEDICAL CENTER | Age: 68
End: 2018-05-24
Attending: PSYCHIATRY & NEUROLOGY
Payer: MEDICARE

## 2018-05-24 NOTE — BH THERAPY
[facility]  Program intake note, initial assessment completed    Patient Name: Prieto Holm  Patient MRN: [id number]  Today's Date: 5/24/2018     Type of session:Case management  Length of session: 60 minutes  Persons in attendance:Patient    Subjective/New Info: Alfredo presents for program intake, referred by Dr. Aguilar. Pt is 67 year old CM, recently  from his wife of 27 years. He reports history of depression and anxiety with passive SI. Alfredo states he has lost interest in activities that used to bring him satisfaction. He describes himself as an unhappy man, controlling and perfectionistic. He feels its worse lately with his separation. He describes his sleep habits as poor, with decreased sleep - trouble falling and staying asleep. His appetite is poor, he is disinterested in food and unmotivated to even prepare meals. He has a large extended family who are supportive, but are also hopeful he gets some help for his depression; this includes his wife (they speak on the phone daily). He has no history of psychiatric treatment. He describes his childhood as lonely. His immigrant parents came from [place] and worked hard at the American dream, leaving little time for him. His sibling came along when he was 14 years old. He joined the  and has worked several jobs over the years. A few years ago he felt he was drinking to much and quit. Today, he states he drinks every evening but feels he is in control of his beer intake. Alfredo is willing to be abstinent for treatment; he is willing to attend 12 step during his treatment. Discussed PHP starting on Tuesday, May 29th following a psychiatry visit tomorrow. He agrees with plan.     Objective/Observations:   Participation: Active verbal participation, Attentive, Engaged and Open to feedback   Grooming: Casual and Neat   Cognition: Alert and Fully Oriented   Eye contact: Good   Mood: Depressed and Anxious   Affect: Congruent with content,  Sad and Tearful   Thought process: Logical and Goal-directed   Speech: Rate within normal limits and Volume within normal limits   Other:     Diagnoses: No diagnosis found. F33.2    Current risk:   SUICIDE: Low   Homicide: Not applicable   Self-harm: Not applicable   Relapse: Low   Other:    Safety Plan reviewed? Yes   If evidence of imminent risk is present, intervention/plan: Dr Aguilar's plan from yesterday reviewed with patient.     Therapeutic Intervention(s): Establish rapport, Stressors assessed and program intake.     Treatment Goal(s)/Objective(s) addressed: intake assessment, establish rapport.      Progress toward Treatment Goals: Return to baseline    Plan:  - Next appointment scheduled:  5/29/2018  - Patient is in agreement with the above plan:  YES    Alisha Salazar R.N.  5/24/2018

## 2018-05-25 ENCOUNTER — OFFICE VISIT (OUTPATIENT)
Dept: BEHAVIORAL HEALTH | Facility: PHYSICIAN GROUP | Age: 68
End: 2018-05-25
Payer: MEDICARE

## 2018-05-25 VITALS
DIASTOLIC BLOOD PRESSURE: 99 MMHG | BODY MASS INDEX: 25.06 KG/M2 | HEIGHT: 72 IN | HEART RATE: 71 BPM | WEIGHT: 185 LBS | SYSTOLIC BLOOD PRESSURE: 148 MMHG

## 2018-05-25 DIAGNOSIS — F51.05 INSOMNIA SECONDARY TO SITUATIONAL DEPRESSION: ICD-10-CM

## 2018-05-25 DIAGNOSIS — F43.21 INSOMNIA SECONDARY TO SITUATIONAL DEPRESSION: ICD-10-CM

## 2018-05-25 DIAGNOSIS — Z63.0 MARITAL STRESS: ICD-10-CM

## 2018-05-25 DIAGNOSIS — F43.21 ADJUSTMENT DISORDER WITH DEPRESSED MOOD: ICD-10-CM

## 2018-05-25 PROCEDURE — 99215 OFFICE O/P EST HI 40 MIN: CPT | Performed by: PSYCHIATRY & NEUROLOGY

## 2018-05-25 SDOH — SOCIAL STABILITY - SOCIAL INSECURITY: PROBLEMS IN RELATIONSHIP WITH SPOUSE OR PARTNER: Z63.0

## 2018-05-25 NOTE — PROGRESS NOTES
PARTIAL HOSPITALIZATION PROGRAM INITIAL PSYCHIATRY EVALUATION      Chief Complaint   Patient presents with   • Depression   • Other     PHP admission         History Of Present Illness:  Prieto Holm is a 67 y.o. old male with history of CA bladder s/p tumor resection in 2010, gout, hypertension, dyslipidemia seen today for [facility] admission. He reports onset of depression about 3 weeks ago when he found out that his wife of 27 years left him while he was on a trip to [place] with his grandson. He came back home from the trip and found out that she was gone and had moved in with her granddaughter in town. He was not expecting the separation and has been feeling down since then. He loves his wife and wants to reconcile with her but is giving her this place that she needs at this time. They have been communicating and have met a few times but he is not talking about reconciliation. He is aware that he has a controlling personality and he likes to fix things as soon as possible. He is also aware that he makes snarky comments that people which might upset them. He is willing to work on himself so that he can improve his marriage. He is hopeful that his wife will come back in the near future. She has done this once about 18 years ago when she came back about a month later. He has noticed feelings of sadness, fatigue, decreased motivation, impaired sleep, decreased appetite since then. He likes to cook and he and his wife would cook food together and he has not been feeling the same without her and is avoiding cooking. He is not isolating himself and has been keeping in touch with his family. Denies feeling emotional or more irritable than before. Denies any reckless or impulsive behaviors. He is struggling with getting a good night's sleep and is waking up several times a night. Denies current symptoms of anxiety. Denies prior or current symptoms consistent with hypomania, berna or psychosis. He has noticed some  passive thoughts of death and thinking about the purpose of living without his wife but denies dwelling on those thoughts or having active thoughts, intent or plan of wanting to hurt himself or anybody else. He feels that he has always been an unhappy man but does not think that he has struggled with depression. He feels that he puts other people ahead of him and will go to any extent to take care of their needs.    Current psychiatric medications - None     Past Psychiatric History:  Denies history of suicide attempt or prior inpatient psychiatry hospitalization.  Previous medication trials - Denies     Past Medical/Surgical History:  Past Medical History:   Diagnosis Date   • Bladder cancer (HCC)     s/p tumor resection and BCG in 2010   • Cancer (HCC)    • Depression    • Gout    • Hyperlipidemia    • Hypertension    • Lip injury     feb. 2017     Past Surgical History:   Procedure Laterality Date   • BLADDER BIOPSY WITH CYSTOSCOPY      bladder cancer excision   • MENISCUS REPAIR     • OTHER ORTHOPEDIC SURGERY Left     elbow surgery       Family Psychiatric History:  Mother - possible dementia  Grand son - autism spectrum disorder    Substance Use/Addiction History:  Alcohol - He reports drinking beer in the summer months and nothing in the winter months. He denies hard liquor use. He has noticed that since his wife left he has been drinking 2-3 beers every evening. At the request of his therapist he has cut back and now he drinks one beer few times a week.  Nicotine - Denies  Illicit drugs - Denies    Social History:  He was born in [place] and immigrated with his family to [place] at the age of 6 and then to  at the age of 14. He was in the [employer] for about 3 years in the 70s and was discharged honorably. He retired at the age of 64 and moved to [place] from the [facility] about a year ago. He has been  ×2,  ×1 and  ×1. He has 2 daughters from his first marriage and they both live in  [place]. He has been  to his second wife for about 27 years and has 2 stepdaughters and a stepson. He was living with his wife and [place] until she left him earlier this month.    Allergies:  Patient has no known allergies.    Medications:  Current Outpatient Prescriptions   Medication Sig Dispense Refill   • colchicine (COLCRYS) 0.6 MG Tab Take 2 tabs PO once then 1 tab PO 1 hour later prn gout attack 3 Tab 2   • indomethacin (INDOCIN) 25 MG Cap Take 1 Cap by mouth 3 times a day as needed (gout). 90 Cap 0   • lisinopril (PRINIVIL) 20 MG Tab Take 1 Tab by mouth every day. 90 Tab 3   • atorvastatin (LIPITOR) 80 MG tablet Take 1 Tab by mouth every evening. 90 Tab 3     No current facility-administered medications for this visit.        Review of Symptoms:  Constitutional - Positive for fatigue, poor appetite  Eyes - Negative for blurry vision  HEENT - Negative for sore throat  Respiratory - Negative for shortness of breath, cough  CVS - Negative for chest pain, palpitations  GI - Negative for nausea, vomiting, abdominal pain, diarrhea, constipation  Skin - Negative for rash  Musculoskeletal - Positive for left knee pain  Neurological - Negative for headaches  Psychiatric - Positive for depression, poor sleep    Physical Examination:  Vital signs: /99   Pulse 71   Ht 1.829 m (6')   Wt 83.9 kg (185 lb)   BMI 25.09 kg/m²     Musculoskeletal: Normal gait. No abnormal movements.     Mental Status Evaluation:   General: Elderly white male, dressed in casual attire, good grooming and hygiene, in no apparent distress, calm and cooperative, good eye contact, no psychomotor agitation or retardation  Orientation: Alert and oriented to person, place and time  Recent and remote memory: Intact  Attention span and concentration: Intact  Speech: Spontaneous, normal rate, rhythm and tone  Thought Process: Linear, logical and goal directed  Thought Content: Denies suicidal or homicidal ideations, intent or  "plan  Perception: Denies auditory or visual hallucinations. No delusions noted  Associations: Intact  Language: Appropriate  Fund of knowledge and vocabulary: Adequate  Mood: \"am okay\"  Affect: Euthymic, mood congruent  Insight: Good  Judgment: Good    Medical Records/Labs/Diagnostic Tests Reviewed:  [place] [facility] records - 1 opioid medication prescription found in the last 1 year, no abuse suspected    Impression:  1. Adjustment disorder with depressed mood (marital stress, wife left him and the house on 5/6/18)  2. Marital stress  3. Insomnia secondary to depression and stress    - Symptoms necessitating partial hospitialization treatment: depression, poor appetite, poor sleep, marital stress    Plan:  1. Admit to Partial Hospitalization Program on 5/29/18   - Daily group therapy daily,    - Psychoeducational groups and teaching forums per schedule,    - Active therapy daily,   - Individual/family counseling sessions with  per treatment plan  2. Medical screening/Physical exam per primary care provider or referring facility.  3. Discussed medications for depression and insomnia but he would like to avoid medications at this time and focus on his coping skills and improving his personality.    Return to clinic in 1 week or sooner if symptoms worsen    The proposed treatment plan was discussed with the patient who was provided the opportunity to ask questions and make suggestions regarding alternative treatment. Patient verbalized understanding and expressed agreement with the plan.     Total face-to-face time: 50 minutes  More than 50% of face-to-face time was spent in counseling and coordinating care. Discussed his marital stress and depression.    Yaneth Mares M.D.  05/25/18    This note was created using voice recognition software (Dragon). The accuracy of the dictation is limited by the abilities of the software. I have reviewed the note prior to signing, however some errors in grammar and context are " still possible. If you have any questions related to this note please do not hesitate to contact our office.

## 2018-05-29 ENCOUNTER — HOSPITAL ENCOUNTER (OUTPATIENT)
Dept: BEHAVIORAL HEALTH | Facility: MEDICAL CENTER | Age: 68
End: 2018-05-29
Attending: PSYCHIATRY & NEUROLOGY
Payer: MEDICARE

## 2018-05-29 DIAGNOSIS — F32.1 CURRENT MODERATE EPISODE OF MAJOR DEPRESSIVE DISORDER WITHOUT PRIOR EPISODE (HCC): ICD-10-CM

## 2018-05-29 PROCEDURE — G0176 OPPS/PHP;ACTIVITY THERAPY: HCPCS

## 2018-05-29 PROCEDURE — G0177 OPPS/PHP; TRAIN & EDUC SERV: HCPCS

## 2018-05-29 PROCEDURE — G0410 GRP PSYCH PARTIAL HOSP 45-50: HCPCS

## 2018-05-29 NOTE — BH THERAPY
Group Therapy Checklist  Attendance: Attended  Attendance Duration (min): 90  Number of Participants: 10  Program/Group: Partial Hospital Program  Topics Covered:  (Process group)  Participation: Active verbal participation, Attentive, Open to feedback, Supportive to other group members  Affect/Mood Range: Normal range, Flexible  Affect/Mood Display: Congruent w/content  Cognition: Alert, Oriented  Evidence of Imminent Suicide Risk: No  Evidence of imminent homicide risk: No  Therapeutic Interventions: Emotion clarification, Supportive psychotherapy  Progress Toward Treatment Goal: Moderate improvement  Alfredo introduced himself to the group and states here for depression, unhappiness and marital discord. He is hoping to explore his controlling issues. Receptive to welcome and peer support.

## 2018-05-29 NOTE — BH THERAPY
Group Therapy Checklist  Attendance: Attended  Attendance Duration (min):  (60 min.)  Number of Participants: 10  Program/Group: Partial Hospitalization Program  Topics Covered: Grief & Loss  Participation: Active verbal participation  Affect/Mood Range: Normal range  Affect/Mood Display: Congruent w/content  Cognition: Oriented, Alert  Evidence of Imminent Suicide Risk: No  Evidence of imminent homicide risk: No  Therapeutic Interventions: Psychoeducation re: (Comment), Emotion clarification, Cognitive clarification  Progress Toward Treatment Goal: Moderate improvement

## 2018-05-29 NOTE — BH THERAPY
Group Therapy Checklist  Attendance: Attended  Attendance Duration (min): 60  Number of Participants: 4  Program/Group: Partial Hospital Program  Topics Covered: Sleep Hygiene  Participation: Active verbal participation  Affect/Mood Range: Normal range, Flexible  Affect/Mood Display: Congruent w/content  Cognition: Alert, Oriented  Evidence of Imminent Suicide Risk: No  Evidence of imminent homicide risk: No  Therapeutic Interventions: Cognitive clarification  Progress Toward Treatment Goal: Moderate improvement

## 2018-05-29 NOTE — BH THERAPY
Group Therapy Checklist  Attendance: Attended  Attendance Duration (min):  (60 min.)  Number of Participants: 4  Program/Group: Partial Hospital Program  Topics Covered: Other (Comment): (Activity Group.)  Participation: Active verbal participation, Attentive  Affect/Mood Range: Normal range  Affect/Mood Display: Congruent w/content  Cognition: Oriented, Alert  Evidence of Imminent Suicide Risk: No  Evidence of imminent homicide risk: No  Therapeutic Interventions: Leisure and Recreation skills  Progress Toward Treatment Goal: Moderate improvement

## 2018-05-30 ENCOUNTER — HOSPITAL ENCOUNTER (OUTPATIENT)
Dept: BEHAVIORAL HEALTH | Facility: MEDICAL CENTER | Age: 68
End: 2018-05-30
Attending: PSYCHIATRY & NEUROLOGY
Payer: MEDICARE

## 2018-05-30 DIAGNOSIS — F32.1 CURRENT MODERATE EPISODE OF MAJOR DEPRESSIVE DISORDER WITHOUT PRIOR EPISODE (HCC): ICD-10-CM

## 2018-05-30 PROCEDURE — G0176 OPPS/PHP;ACTIVITY THERAPY: HCPCS

## 2018-05-30 PROCEDURE — G0177 OPPS/PHP; TRAIN & EDUC SERV: HCPCS

## 2018-05-30 PROCEDURE — G0411 INTER ACTIVE GRP PSYCH PARTI: HCPCS

## 2018-05-30 NOTE — BH THERAPY
Group Therapy Checklist  Attendance: Attended  Attendance Duration (min): 60  Number of Participants: 4  Program/Group: Partial Hospital Program  Topics Covered: Pain vs. Suffering  Participation: Active verbal participation  Affect/Mood Range: Normal range, Flexible  Affect/Mood Display: Congruent w/content  Cognition: Alert, Oriented  Evidence of Imminent Suicide Risk: No  Evidence of imminent homicide risk: No  Therapeutic Interventions: Cognitive clarification, Emotion clarification  Progress Toward Treatment Goal: Moderate improvement

## 2018-05-30 NOTE — BH THERAPY
Patient actively participated in process group.  Addressed active unresolved emotional issues. Taught some emotional skills and techniques to assist with the emotional skill building that relates to their presenting issues and active treatment plan.    Patient had an attendance duration in group of 90 minutes.

## 2018-05-30 NOTE — CARE PLAN
Problem: Mood Instability Interfering with Adl’S  Goal: Improved mood and functioning  PHP attendance M-F 9am-3pm starting 5/29/18 for a week, then reevaluate. Focus: Process grief and loss, identification of feelings, challenge fears and negative self talk through informational forums, process groups and individual sessions. Alfredo will identify and verbalize three coping strategies to decrease depression and anxiety, replacing with realistic self talk by discharge.      Intervention: Family Counseling Sessions  Alfredo's wife asked for a divorce last night. Processed grief and loss, anger and resentment, sadness and fears.

## 2018-05-31 ENCOUNTER — HOSPITAL ENCOUNTER (OUTPATIENT)
Dept: BEHAVIORAL HEALTH | Facility: MEDICAL CENTER | Age: 68
End: 2018-05-31
Attending: PSYCHIATRY & NEUROLOGY
Payer: MEDICARE

## 2018-05-31 DIAGNOSIS — F43.21 ADJUSTMENT DISORDER WITH DEPRESSED MOOD: ICD-10-CM

## 2018-05-31 PROCEDURE — G0177 OPPS/PHP; TRAIN & EDUC SERV: HCPCS

## 2018-05-31 PROCEDURE — G0410 GRP PSYCH PARTIAL HOSP 45-50: HCPCS

## 2018-05-31 PROCEDURE — G0176 OPPS/PHP;ACTIVITY THERAPY: HCPCS

## 2018-05-31 NOTE — BH THERAPY
Group Therapy Checklist  Attendance: Attended  Attendance Duration (min):  (90 min.)  Number of Participants: 11  Program/Group: Partial Hospital Program  Topics Covered: Other (Comment): (Group Therapy)  Participation: Verbally monopolizing, Attentive, Open to feedback (Pt expressed feeling blindsided by receiving divorce papers recently. )  Affect/Mood Range: Normal range  Affect/Mood Display: Congruent w/content  Cognition: Oriented, Alert  Evidence of Imminent Suicide Risk: No  Evidence of imminent homicide risk: No  Therapeutic Interventions: Emotion clarification, Cognitive clarification  Progress Toward Treatment Goal: Moderate improvement

## 2018-05-31 NOTE — BH THERAPY
Group Therapy Checklist  Attendance: Attended  Attendance Duration (min): 60  Number of Participants: 4  Program/Group: Partial Hospital Program  Topics Covered:  (Interactive game)  Participation: Active verbal participation, Attentive  Affect/Mood Range: Normal range, Flexible  Affect/Mood Display: Congruent w/content  Cognition: Alert, Oriented  Evidence of Imminent Suicide Risk: No  Evidence of imminent homicide risk: No  Therapeutic Interventions: Socialization, Leisure and Recreation skills  Progress Toward Treatment Goal: Moderate improvement

## 2018-06-01 ENCOUNTER — HOSPITAL ENCOUNTER (OUTPATIENT)
Dept: BEHAVIORAL HEALTH | Facility: MEDICAL CENTER | Age: 68
End: 2018-06-01
Attending: PSYCHIATRY & NEUROLOGY
Payer: MEDICARE

## 2018-06-01 DIAGNOSIS — F43.21 ADJUSTMENT DISORDER WITH DEPRESSED MOOD: ICD-10-CM

## 2018-06-01 PROCEDURE — G0410 GRP PSYCH PARTIAL HOSP 45-50: HCPCS

## 2018-06-01 PROCEDURE — G0176 OPPS/PHP;ACTIVITY THERAPY: HCPCS

## 2018-06-01 PROCEDURE — G0177 OPPS/PHP; TRAIN & EDUC SERV: HCPCS

## 2018-06-01 NOTE — BH THERAPY
Group Therapy Checklist  Attendance: Attended  Attendance Duration (min): 60  Number of Participants: 4  Program/Group: Partial Hospital Program  Topics Covered:  (Choices and Balance)  Participation: Active verbal participation, Attentive  Affect/Mood Range: Normal range, Flexible  Affect/Mood Display: Congruent w/content  Cognition: Alert, Oriented  Evidence of Imminent Suicide Risk: No  Evidence of imminent homicide risk: No  Therapeutic Interventions: Values clarification  Progress Toward Treatment Goal: Moderate improvement

## 2018-06-01 NOTE — BH THERAPY
Group Therapy Checklist  Attendance: Attended  Attendance Duration (min):  (90 min.)  Number of Participants: 9  Program/Group: Partial Hospital Program  Topics Covered: Weekend planning  Participation: Active verbal participation, Attentive (Pt expressed saddness about his impending divorce.)  Affect/Mood Range: Constricted  Affect/Mood Display: Congruent w/content  Cognition: Oriented, Alert  Evidence of Imminent Suicide Risk: No  Evidence of imminent homicide risk: No  Therapeutic Interventions: Emotion clarification, Cognitive clarification  Progress Toward Treatment Goal: Moderate improvement

## 2018-06-01 NOTE — CARE PLAN
Problem: Mood Instability Interfering with Adl’S  Goal: Improved mood and functioning  PHP attendance M-F 9am-3pm starting 5/29/18 for a week, then reevaluate. Focus: Process grief and loss, identification of feelings, challenge fears and negative self talk through informational forums, process groups and individual sessions. Alfredo will identify and verbalize three coping strategies to decrease depression and anxiety, replacing with realistic self talk by discharge.      Intervention: Grief and Loss   [facility]  Therapy Progress Note    Patient Name: Prieto Holm  Patient MRN: [id number]  Today's Date: 6/1/2018     Type of session:Individual psychotherapy  Length of session: 30 minutes  Persons in attendance:Patient    Subjective/New Info: individual session. Alfredo processed the roller coaster of emotions following the divorce papers. He is torn with practicalities of where he wants to live, selling the house and taking care of daily chores while feeling angry, sad and scared. He is grateful that the neighbors have reached out each day, that he has a good relationship with his children and that he and his wife do talk. Denies SI, denies alcohol use.     Objective/Observations:   Participation: Active verbal participation, Attentive, Engaged and Open to feedback   Grooming: Casual and Neat   Cognition: Alert and Fully Oriented   Eye contact: Good   Mood: Depressed   Affect: Flexible, Full range, Congruent with content, Sad, Anxious and Angry   Thought process: Logical and Goal-directed   Speech: Rate within normal limits and Volume within normal limits   Other:     Diagnoses: No diagnosis found. F33.1    Current risk:   SUICIDE: Not applicable   Homicide: Not applicable   Self-harm: Not applicable   Relapse: Not applicable   Other:    Safety Plan reviewed? Not Indicated   If evidence of imminent risk is present, intervention/plan:     Therapeutic Intervention(s): Distress tolerance skills, Leisure  and recreation skills, Review treatment plan, Self-care skills and Supportive psychotherapy    Treatment Goal(s)/Objective(s) addressed: grief and loss     Progress toward Treatment Goals: Mild improvement    Plan:  - Continue Partial Hospital Program, step down to IOP after Monday, plans to attend Wednesday, Thursday and Friday  - Next appointment scheduled:  6/4/2018  - Patient is in agreement with the above plan:  YES    Alisha Salazar R.N.  6/1/2018

## 2018-06-04 ENCOUNTER — TELEPHONE (OUTPATIENT)
Dept: BEHAVIORAL HEALTH | Facility: MEDICAL CENTER | Age: 68
End: 2018-06-04

## 2018-06-04 ENCOUNTER — HOSPITAL ENCOUNTER (OUTPATIENT)
Dept: BEHAVIORAL HEALTH | Facility: MEDICAL CENTER | Age: 68
End: 2018-06-04
Attending: PSYCHIATRY & NEUROLOGY
Payer: MEDICARE

## 2018-06-04 DIAGNOSIS — F32.1 CURRENT MODERATE EPISODE OF MAJOR DEPRESSIVE DISORDER WITHOUT PRIOR EPISODE (HCC): ICD-10-CM

## 2018-06-04 PROCEDURE — G0410 GRP PSYCH PARTIAL HOSP 45-50: HCPCS

## 2018-06-04 PROCEDURE — G0176 OPPS/PHP;ACTIVITY THERAPY: HCPCS

## 2018-06-04 PROCEDURE — G0177 OPPS/PHP; TRAIN & EDUC SERV: HCPCS

## 2018-06-04 NOTE — CARE PLAN
Problem: Mood Instability Interfering with Adl’S  Goal: Stable mood and functioning  Improve mood and coping with stressors and increase understanding of depression by developing coping skills as evidenced by verbalizing three strategies to address negative thought patterns, reactivity and intensity by discharge [facility].   W[place]thington discharges from [facility] and goes to [facility] starting on Wednesday, June 6, 2018. He reports working one day at a time has helped him stay in the present and out of fear for what the future brings with his divorce.   Goal: Improved mood and functioning  PHP attendance M-F 9am-3pm starting 5/29/18 for a week, then reevaluate. Focus: Process grief and loss, identification of feelings, challenge fears and negative self talk through informational forums, process groups and individual sessions. Alfredo will identify and verbalize three coping strategies to decrease depression and anxiety, replacing with realistic self talk by discharge.    Outcome: MET Date Met: 06/04/18

## 2018-06-04 NOTE — BH THERAPY
Group Therapy Checklist  Attendance: Attended  Attendance Duration (min): 60  Number of Participants: 9  Program/Group: Partial Hospital Program  Topics Covered: Assertiveness  Participation: Active verbal participation  Affect/Mood Range: Normal range, Flexible  Affect/Mood Display: Congruent w/content  Cognition: Alert, Oriented  Evidence of Imminent Suicide Risk: No  Evidence of imminent homicide risk: No  Therapeutic Interventions: Cognitive clarification, Self-care skills  Progress Toward Treatment Goal: Moderate improvement

## 2018-06-04 NOTE — TELEPHONE ENCOUNTER
[facility]  TRANSFER/DISCHARGE SUMMARY FORM    HHPI / SCP: no Other Ins.: Medicare     Patient Name: Prieto Holm  Admission Date: 18  Level of Care Attended:  Partial Hosp : 1950  Transfer/Discharge Date: MRN: [id number]  18       SIGNIFICANT FINDINGS/CLINICAL IMPRESSION:   DSM Codes:   PHP    ICD10 Codes:   No diagnosis found.F33.1    Additional problems identified via assessment: his wife asked for a divorce while he was attending [facility]    Treatment Components in Which Patient Participated (check all that apply):  Education group(s), 1:1 teaching/therapy, Medication Management and Group Therapy    Summary of Course of Treatment: active participation all education forums, process groups, activity groups and individual counseling sessions.     Condition at Time of Transfer/Discharge: Met treatment goals.    [x] Medications Reviewed with Copy to Patient: not taking medications at this time    Referred to: Refer to [facility]: Intensive Outpatient Program     Patient is in agreement with discharge plan: yes    Alisha Saalzar R.N.

## 2018-06-04 NOTE — BH THERAPY
Group Therapy Checklist  Attendance: Attended  Attendance Duration (min): 60  Number of Participants: 4  Program/Group: Partial Hospital Program  Topics Covered: [place] kindness  Participation: Active verbal participation  Affect/Mood Range: Normal range, Flexible  Affect/Mood Display: Congruent w/content  Cognition: Alert, Oriented  Evidence of Imminent Suicide Risk: No  Evidence of imminent homicide risk: No  Therapeutic Interventions: Cognitive clarification, Emotion clarification  Progress Toward Treatment Goal: Moderate improvement

## 2018-06-05 NOTE — BH THERAPY
Group Therapy Checklist  Attendance: Attended  Attendance Duration (min):  (90 min.)  Number of Participants: 8  Program/Group: Partial Hospital Program  Topics Covered: Other (Comment): (Group Therapy)  Participation: Limited verbal participation, Attentive (Pt expressed needing to dial into his emotions and understand what led to his relationship divorce issue. )  Affect/Mood Range: Normal range  Affect/Mood Display: Congruent w/content  Cognition: Oriented, Alert  Evidence of imminent homicide risk: No  Therapeutic Interventions: Emotion clarification, Cognitive clarification  Progress Toward Treatment Goal: Moderate improvement

## 2018-06-06 ENCOUNTER — HOSPITAL ENCOUNTER (OUTPATIENT)
Dept: BEHAVIORAL HEALTH | Facility: MEDICAL CENTER | Age: 68
End: 2018-06-06
Attending: PSYCHIATRY & NEUROLOGY
Payer: MEDICARE

## 2018-06-06 DIAGNOSIS — F43.21 ADJUSTMENT DISORDER WITH DEPRESSED MOOD: ICD-10-CM

## 2018-06-06 PROCEDURE — 90853 GROUP PSYCHOTHERAPY: CPT

## 2018-06-06 NOTE — BH THERAPY
Group Therapy Checklist  Attendance: Attended  Attendance Duration (min): 60  Number of Participants: 8  Program/Group: Intensive Outpatient Program  Topics Covered: Chalk talk  Participation: Active verbal participation, Attentive  Affect/Mood Range: Normal range, Flexible  Affect/Mood Display: Congruent w/content  Cognition: Alert, Oriented  Evidence of Imminent Suicide Risk: No  Evidence of imminent homicide risk: No  Therapeutic Interventions: Cognitive clarification, Relapse prevention  Progress Toward Treatment Goal: Moderate improvement

## 2018-06-07 ENCOUNTER — HOSPITAL ENCOUNTER (OUTPATIENT)
Dept: BEHAVIORAL HEALTH | Facility: MEDICAL CENTER | Age: 68
End: 2018-06-07
Attending: PSYCHIATRY & NEUROLOGY
Payer: MEDICARE

## 2018-06-07 DIAGNOSIS — F34.1 DYSTHYMIA: ICD-10-CM

## 2018-06-07 PROCEDURE — 90853 GROUP PSYCHOTHERAPY: CPT | Performed by: MARRIAGE & FAMILY THERAPIST

## 2018-06-07 NOTE — BH THERAPY
Group Therapy Checklist  Attendance: Attended  Attendance Duration (min): 60  Number of Participants: 8  Program/Group: Intensive Outpatient Program  Topics Covered: Mindfulness  Participation: Active verbal participation, Attentive  Affect/Mood Range: Normal range, Flexible  Affect/Mood Display: Congruent w/content  Cognition: Alert, Oriented  Evidence of Imminent Suicide Risk: No  Evidence of imminent homicide risk: No  Therapeutic Interventions: Emotion clarification, Distress tolerance skills  Progress Toward Treatment Goal: Moderate improvement

## 2018-06-08 ENCOUNTER — TELEPHONE (OUTPATIENT)
Dept: BEHAVIORAL HEALTH | Facility: MEDICAL CENTER | Age: 68
End: 2018-06-08

## 2018-06-08 ENCOUNTER — HOSPITAL ENCOUNTER (OUTPATIENT)
Dept: BEHAVIORAL HEALTH | Facility: MEDICAL CENTER | Age: 68
End: 2018-06-08
Attending: PSYCHIATRY & NEUROLOGY
Payer: MEDICARE

## 2018-06-08 NOTE — CARE PLAN
Problem: Mood Instability Interfering with Adl’S  Goal: Stable mood and functioning  Improve mood and coping with stressors and increase understanding of depression by developing coping skills as evidenced by verbalizing three strategies to address negative thought patterns, reactivity and intensity by discharge IOP.      Intervention: Intensive Outpatient Program  Alfredo called in today; he injured his foot playing basketball with grandson. He reports having ups and downs with divorce but takes it one day at a time. He states he does best not going into future thinking.

## 2018-06-11 ENCOUNTER — TELEPHONE (OUTPATIENT)
Dept: BEHAVIORAL HEALTH | Facility: MEDICAL CENTER | Age: 68
End: 2018-06-11

## 2018-06-11 ENCOUNTER — APPOINTMENT (OUTPATIENT)
Dept: BEHAVIORAL HEALTH | Facility: PHYSICIAN GROUP | Age: 68
End: 2018-06-11
Payer: MEDICARE

## 2018-06-12 ENCOUNTER — HOSPITAL ENCOUNTER (OUTPATIENT)
Dept: BEHAVIORAL HEALTH | Facility: MEDICAL CENTER | Age: 68
End: 2018-06-12
Attending: PSYCHIATRY & NEUROLOGY
Payer: MEDICARE

## 2018-06-12 ENCOUNTER — TELEPHONE (OUTPATIENT)
Dept: BEHAVIORAL HEALTH | Facility: MEDICAL CENTER | Age: 68
End: 2018-06-12

## 2018-06-12 DIAGNOSIS — F43.21 ADJUSTMENT DISORDER WITH DEPRESSED MOOD: ICD-10-CM

## 2018-06-12 PROCEDURE — 90834 PSYTX W PT 45 MINUTES: CPT | Mod: XU

## 2018-06-12 PROCEDURE — 90853 GROUP PSYCHOTHERAPY: CPT

## 2018-06-12 NOTE — BH THERAPY
Group Therapy Checklist  Attendance: Attended  Attendance Duration (min):  90  Number of Participants: 8  Program/Group: Intensive Outpatient Program  Topics Covered: Other (Comment): (Process Therapy)  Participation: Active verbal participation, Attentive, Supportive to other group members  Affect/Mood Range: Normal range  Affect/Mood Display: Congruent w/content  Cognition: Alert, Oriented  Evidence of Imminent Suicide Risk: No  Evidence of imminent homicide risk: No  Therapeutic Interventions: Relapse prevention, Emotion clarification, Values clarification, Interpersonal effectiveness skills  Progress Toward Treatment Goal: Significant improvement    Alfredo was noted to have significant improvement in his insight of the impact of his past use on his relationships with his spouse and family. He was able to provide feed-back and share his experience with other participants. He was noted to be more open and have increased participation during this group.

## 2018-06-12 NOTE — BH THERAPY
Group Therapy Checklist  Attendance: Attended  Attendance Duration (min):  (60 min.)  Number of Participants: 7  Program/Group: Intensive Outpatient Program  Topics Covered: Values based action  Participation: Active verbal participation  Affect/Mood Range: Normal range  Affect/Mood Display: Congruent w/content  Cognition: Oriented, Alert  Evidence of Imminent Suicide Risk: No  Evidence of imminent homicide risk: No  Therapeutic Interventions: Psychoeducation re: (Comment), Values clarification  Progress Toward Treatment Goal: Moderate improvement

## 2018-06-12 NOTE — TELEPHONE ENCOUNTER
[facility]    Treatment Team Staffing    Patient Name: Prieto Holm Program: IOP Date: 6/12/2018     Attendees:  Yumiko Clark RN, [facility]; JOANN Garcia, [facility]; Alisha Salazar RN and Yaneth Mares MD    Patient's Progress toward Goals Listed on the Treatment Plan: opening up more, expressing feelings    1. Client's Participation When in Attendance Was: Active in a Positive Way    2. Counselor's Evaluation of Client's Progress: Positive Movement    3. Patient is attending group and individual sessions and is progressing well toward the treatment goals: yes      YES NO   A. Relapse During Program []  [x]    B. Requires physician review []  [x]    C. Referral to program inappropriate []  [x]    D. Non compliance with Treatment Plan []  [x]    E. Early treatment termination (lack of attendance) []  [x]     []  []      Comments: meeting with , reluctant to go ahead, just to review paperwork    Treatment Plan Review: - Continue Intensive Outpatient Program  - Patient is in agreement with the above plan:  YES

## 2018-06-12 NOTE — CARE PLAN
Problem: Mood Instability Interfering with Adl’S  Goal: Stable mood and functioning  Improve mood and coping with stressors and increase understanding of depression by developing coping skills as evidenced by verbalizing three strategies to address negative thought patterns, reactivity and intensity by discharge IOP.      Intervention: Conflict Resolution   [facility]  Therapy Progress Note    Patient Name: Prieto Holm  Patient MRN: [id number]  Today's Date: 6/12/2018     Type of session:Individual psychotherapy  Length of session: 45 minutes  Persons in attendance:Patient    Subjective/New Info: Alfredo processed his divorce proceedings; it's been two weeks. He still feels blind sided by the papers served and meets with an  to discuss options. His first wish is to stay . Alfredo and his wife talk daily and they go to the store together, spend time with family which adds to his confusion about her intentions. He is working on himself, adjusting his attitude, speaking to a  and reading his bible. He feels he is changing and feels the benefit of program. He continues to have trouble sleeping and is eating only one meal a day; he does not want to take medications. Explored some habits and rituals to adjust his sleep hygiene. He reports abstinence from alcohol. Denies SI.     Objective/Observations:   Participation: Active verbal participation, Attentive, Engaged and Open to feedback   Grooming: Casual and Neat   Cognition: Alert and Fully Oriented   Eye contact: Good   Mood: Depressed   Affect: Flexible, Full range and Congruent with content   Thought process: Logical and Goal-directed   Speech: Rate within normal limits and Volume within normal limits   Other:     Diagnoses: No diagnosis found.     Current risk:   SUICIDE: Not applicable   Homicide: Not applicable   Self-harm: Not applicable   Relapse: Not applicable   Other:    Safety Plan reviewed? Not Indicated   If evidence of  "imminent risk is present, intervention/plan:     Therapeutic Intervention(s): Clarify:  Clarify feelings and Clarify values, Conflict clarification, Review treatment plan, Self-care skills, Stressors assessed and Supportive psychotherapy    Treatment Goal(s)/Objective(s) addressed: conflict resolution: processing his divorce, mood stability     Progress toward Treatment Goals: Moderate improvement    Plan:  - Continue Intensive Outpatient Program  - \"Homework\" recommendation: recognize and replace self defeating thoughts.   - Next appointment scheduled:  6/13/2018  - Patient is in agreement with the above plan:  YES    Alisha Salazar R.N.  6/12/2018                                         "

## 2018-06-13 ENCOUNTER — HOSPITAL ENCOUNTER (OUTPATIENT)
Dept: BEHAVIORAL HEALTH | Facility: MEDICAL CENTER | Age: 68
End: 2018-06-13
Attending: PSYCHIATRY & NEUROLOGY
Payer: MEDICARE

## 2018-06-13 DIAGNOSIS — F43.21 ADJUSTMENT DISORDER WITH DEPRESSED MOOD: ICD-10-CM

## 2018-06-13 DIAGNOSIS — F34.1 DYSTHYMIA: ICD-10-CM

## 2018-06-13 DIAGNOSIS — F32.1 CURRENT MODERATE EPISODE OF MAJOR DEPRESSIVE DISORDER WITHOUT PRIOR EPISODE (HCC): ICD-10-CM

## 2018-06-13 PROCEDURE — 90853 GROUP PSYCHOTHERAPY: CPT

## 2018-06-13 NOTE — BH THERAPY
Group Therapy Checklist  Attendance: Attended  Attendance Duration (min):  (60 min)  Number of Participants: 7  Program/Group: Intensive Outpatient Program  Topics Covered: 12 Step orientation  Participation: Active verbal participation  Affect/Mood Range: Normal range, Flexible  Affect/Mood Display: Congruent w/content  Cognition: Alert, Oriented  Evidence of Imminent Suicide Risk: No  Evidence of imminent homicide risk: No  Therapeutic Interventions: Emotion clarification, Cognitive clarification  Progress Toward Treatment Goal: Moderate improvement

## 2018-06-15 ENCOUNTER — HOSPITAL ENCOUNTER (OUTPATIENT)
Dept: BEHAVIORAL HEALTH | Facility: MEDICAL CENTER | Age: 68
End: 2018-06-15
Attending: PSYCHIATRY & NEUROLOGY
Payer: MEDICARE

## 2018-06-15 DIAGNOSIS — F43.21 ADJUSTMENT DISORDER WITH DEPRESSED MOOD: ICD-10-CM

## 2018-06-15 PROCEDURE — 90853 GROUP PSYCHOTHERAPY: CPT | Performed by: MARRIAGE & FAMILY THERAPIST

## 2018-06-15 NOTE — BH THERAPY
"Group Therapy Checklist  Attendance: Attended  Attendance Duration (min):  (60 min.)  Number of Participants: 10  Program/Group: Intensive Outpatient Program  Topics Covered: \"Pieces of Silence\"  Participation: Active verbal participation, Attentive, Supportive to other group members  Affect/Mood Range: Normal range  Affect/Mood Display: Sad  Cognition: Oriented, Alert  Evidence of Imminent Suicide Risk: No  Evidence of imminent homicide risk: No  Therapeutic Interventions: Psychoeducation re: (Comment), Interpersonal effectiveness skills  Progress Toward Treatment Goal: Moderate improvement  "

## 2018-06-19 ENCOUNTER — TELEPHONE (OUTPATIENT)
Dept: BEHAVIORAL HEALTH | Facility: MEDICAL CENTER | Age: 68
End: 2018-06-19

## 2018-06-20 ENCOUNTER — HOSPITAL ENCOUNTER (OUTPATIENT)
Dept: BEHAVIORAL HEALTH | Facility: MEDICAL CENTER | Age: 68
End: 2018-06-20
Attending: PSYCHIATRY & NEUROLOGY
Payer: MEDICARE

## 2018-06-20 DIAGNOSIS — F33.1 MAJOR DEPRESSIVE DISORDER, RECURRENT, MODERATE: ICD-10-CM

## 2018-06-20 PROCEDURE — 90832 PSYTX W PT 30 MINUTES: CPT | Performed by: PSYCHIATRY & NEUROLOGY

## 2018-06-20 NOTE — CARE PLAN
Problem: Mood Instability Interfering with Adl’S  Goal: Stable mood and functioning  Improve mood and coping with stressors and increase understanding of depression by developing coping skills as evidenced by verbalizing three strategies to address negative thought patterns, reactivity and intensity by discharge IOP.      Intervention: Relapse Prevention Plan   [facility]  Therapy Progress Note    Patient Name: Prieto Holm  Patient MRN: [id number]  Today's Date: 6/20/2018     Type of session:Individual psychotherapy  Length of session: 30 minutes  Persons in attendance:Patient    Subjective/New Info: Alfredo was unable to come in today due to poor sleep the night before. He left message at 0500 and scheduled this 1500 appt. Discussed his sleep hygiene, racing thoughts and divorce. Alfredo is set back by the push and pull of his contact with his wife. He is working with an  and is encouraged to take action to get out of his paralysis and rumination. Assigned relapse prevention plan and goal sheet to review next session.  Denies SI; denies drinking alcohol.    Objective/Observations:   Participation: Active verbal participation, Attentive and Open to feedback   Grooming: Casual and Neat   Cognition: Alert and Fully Oriented   Eye contact: Limited   Mood: Depressed and Anxious   Affect: Flexible, Full range and Congruent with content   Thought process: Logical and Goal-directed   Speech: Rate within normal limits and Volume within normal limits   Other:     Diagnoses: No diagnosis found. F33.2    Current risk:   SUICIDE: Not applicable   Homicide: Not applicable   Self-harm: Not applicable   Relapse: Not applicable   Other:    Safety Plan reviewed? Not Indicated   If evidence of imminent risk is present, intervention/plan:     Therapeutic Intervention(s): Conflict resolution skills, Distress tolerance skills, Parenting/familial roles addressed, Review treatment plan, Self-care skills, Stressors  "assessed and Supportive psychotherapy    Treatment Goal(s)/Objective(s) addressed: conflict resolution skills, personalized recovery plan.      Progress toward Treatment Goals: Mild improvement    Plan:  - Continue Intensive Outpatient Program  - \"Homework\" recommendation: Recovery plan and goals  - Next appointment scheduled:  6/21/2018  - Transition toward termination  - Patient is in agreement with the above plan:  YES      Alisha Salazar R.N.  6/20/2018                                         "

## 2018-06-21 ENCOUNTER — HOSPITAL ENCOUNTER (OUTPATIENT)
Dept: BEHAVIORAL HEALTH | Facility: MEDICAL CENTER | Age: 68
End: 2018-06-21
Attending: PSYCHIATRY & NEUROLOGY
Payer: MEDICARE

## 2018-06-21 DIAGNOSIS — F43.21 ADJUSTMENT DISORDER WITH DEPRESSED MOOD: ICD-10-CM

## 2018-06-21 PROCEDURE — 90853 GROUP PSYCHOTHERAPY: CPT

## 2018-06-21 NOTE — BH THERAPY
Group Therapy Checklist  Attendance: Attended  Attendance Duration (min): 60  Number of Participants: 11  Program/Group: Intensive Outpatient Program  Topics Covered: Stress Management  Participation: Active verbal participation, Attentive  Affect/Mood Range: Normal range, Flexible  Affect/Mood Display: Congruent w/content  Cognition: Alert, Oriented  Evidence of Imminent Suicide Risk: No  Evidence of imminent homicide risk: No  Therapeutic Interventions: Cognitive clarification  Progress Toward Treatment Goal: Moderate improvement

## 2018-06-21 NOTE — BH THERAPY
Group Therapy Checklist  Attendance: Attended  Attendance Duration (min):  (90 min.)  Number of Participants: 11  Program/Group: Intensive Outpatient Program  Topics Covered: Other (Comment): (Group Therapy)  Participation: Active verbal participation, Attentive, Supportive to other group members, Open to feedback (Pt expressed anger with wife who is sending him mixed messages about boundaries she set with emma ns they are going through a divorce. )  Affect/Mood Range: Normal range  Affect/Mood Display: Congruent w/content, Sad, Angry  Cognition: Oriented, Alert  Evidence of Imminent Suicide Risk: No  Evidence of imminent homicide risk: No  Therapeutic Interventions: Emotion clarification, Cognitive clarification  Progress Toward Treatment Goal: Moderate improvement

## 2018-06-22 ENCOUNTER — HOSPITAL ENCOUNTER (OUTPATIENT)
Dept: BEHAVIORAL HEALTH | Facility: MEDICAL CENTER | Age: 68
End: 2018-06-22
Attending: PSYCHIATRY & NEUROLOGY
Payer: MEDICARE

## 2018-06-22 DIAGNOSIS — F43.21 ADJUSTMENT DISORDER WITH DEPRESSED MOOD: ICD-10-CM

## 2018-06-22 PROCEDURE — 90853 GROUP PSYCHOTHERAPY: CPT | Performed by: MARRIAGE & FAMILY THERAPIST

## 2018-06-22 NOTE — BH THERAPY
Group Therapy Checklist  Attendance: Attended  Attendance Duration (min):  (90 min.)  Number of Participants: 12  Program/Group: Intensive Outpatient Program  Topics Covered: Weekend planning  Participation: Limited verbal participation (Pt continues to have up and down days.  he was quiet and kept mostly to himself today .  )  Affect/Mood Range: Constricted  Affect/Mood Display: Sad, Congruent w/content  Cognition: Oriented, Alert  Evidence of Imminent Suicide Risk: No  Evidence of imminent homicide risk: No  Therapeutic Interventions: Emotion clarification, Cognitive clarification  Progress Toward Treatment Goal: Moderate improvement

## 2018-06-22 NOTE — BH THERAPY
Group Therapy Checklist  Attendance: Attended  Attendance Duration (min):  (60 min)  Number of Participants: 12  Program/Group: Intensive Outpatient Program  Topics Covered: Med aspects [place]  Participation: Active verbal participation  Affect/Mood Range: Flexible, Normal range  Affect/Mood Display: Congruent w/content  Cognition: Oriented, Alert  Evidence of Imminent Suicide Risk: No  Evidence of imminent homicide risk: No  Therapeutic Interventions: Psychoeducation re: (Comment)  Progress Toward Treatment Goal: Moderate improvement

## 2018-06-25 ENCOUNTER — APPOINTMENT (OUTPATIENT)
Dept: BEHAVIORAL HEALTH | Facility: PHYSICIAN GROUP | Age: 68
End: 2018-06-25
Payer: MEDICARE

## 2018-06-27 ENCOUNTER — HOSPITAL ENCOUNTER (OUTPATIENT)
Dept: BEHAVIORAL HEALTH | Facility: MEDICAL CENTER | Age: 68
End: 2018-06-27
Attending: PSYCHIATRY & NEUROLOGY
Payer: MEDICARE

## 2018-06-27 DIAGNOSIS — F43.21 ADJUSTMENT DISORDER WITH DEPRESSED MOOD: ICD-10-CM

## 2018-06-27 PROCEDURE — 90791 PSYCH DIAGNOSTIC EVALUATION: CPT | Performed by: MARRIAGE & FAMILY THERAPIST

## 2018-06-27 NOTE — BH THERAPY
"Group Therapy Checklist  Attendance: Attended  Attendance Duration (min):  (60 min)  Number of Participants: 6  Program/Group: Intensive Outpatient Program  Topics Covered: \"Pieces of Silence\"  Participation: Attentive  Affect/Mood Range: Normal range, Flexible  Affect/Mood Display: Congruent w/content  Cognition: Oriented, Alert  Evidence of Imminent Suicide Risk: No  Evidence of imminent homicide risk: No  Therapeutic Interventions: Psychoeducation re: (Comment)  Progress Toward Treatment Goal: Moderate improvement  "

## 2018-06-28 ENCOUNTER — HOSPITAL ENCOUNTER (OUTPATIENT)
Dept: BEHAVIORAL HEALTH | Facility: MEDICAL CENTER | Age: 68
End: 2018-06-28
Attending: PSYCHIATRY & NEUROLOGY
Payer: MEDICARE

## 2018-06-28 ENCOUNTER — TELEPHONE (OUTPATIENT)
Dept: BEHAVIORAL HEALTH | Facility: MEDICAL CENTER | Age: 68
End: 2018-06-28

## 2018-06-28 NOTE — TELEPHONE ENCOUNTER
[facility]    Treatment Team Staffing    Patient Name: Prieto Holm Program: IOP Date: 6/28/2018     Attendees: Yaneth Mares MD, Daysi García Ed.S, MFT, [facility], Kristen Ferris RN, BC,[facility], Yumiko Clark RN MA, [facility]    Patient's Progress toward Goals Listed on the Treatment Plan: Alfredo is attending group with good participation.     1. Client's Participation When in Attendance Was: Active in a Positive Way    2. Counselor's Evaluation of Client's Progress: Positive Movement    3. Patient is attending group and individual sessions and is progressing well toward the treatment goals: yes      YES NO   A. Relapse During Program []  [x]    B. Requires physician review []  [x]    C. Referral to program inappropriate []  [x]    D. Non compliance with Treatment Plan []  [x]    E. Early treatment termination (lack of attendance) []  [x]    F. Other:  []  []      Comments: Alfredo continues to make progress with his depression and emotional regulation.      Treatment Plan Review: - Continue Individual therapy, Group therapy and Intensive Outpatient Program

## 2018-06-28 NOTE — TELEPHONE ENCOUNTER
This writer called Alfredo to see why he did not make it to group.  States he left a message on Alisha's phone that he got no sleep last night.

## 2018-06-28 NOTE — ADDENDUM NOTE
Encounter addended by: MARY Cisneros on: 6/27/2018  5:04 PM<BR>    Actions taken: Visit diagnoses modified, Charge Capture section accepted

## 2018-06-29 ENCOUNTER — TELEPHONE (OUTPATIENT)
Dept: BEHAVIORAL HEALTH | Facility: MEDICAL CENTER | Age: 68
End: 2018-06-29

## 2018-06-29 NOTE — TELEPHONE ENCOUNTER
NCNS to program again.  This writer called and he stated he did not get enough sleep last night so he stayed home.  Agreed to come in on Monday for program.

## 2018-06-29 NOTE — ADDENDUM NOTE
Encounter addended by: Kristen Ferris R.N. on: 6/29/2018  8:46 AM<BR>    Actions taken: Care Plan problems brought forward

## 2018-07-03 ENCOUNTER — TELEPHONE (OUTPATIENT)
Dept: BEHAVIORAL HEALTH | Facility: MEDICAL CENTER | Age: 68
End: 2018-07-03

## 2018-07-03 ENCOUNTER — HOSPITAL ENCOUNTER (OUTPATIENT)
Dept: BEHAVIORAL HEALTH | Facility: MEDICAL CENTER | Age: 68
End: 2018-07-03
Attending: PSYCHIATRY & NEUROLOGY
Payer: MEDICARE

## 2018-07-03 DIAGNOSIS — F43.21 ADJUSTMENT DISORDER WITH DEPRESSED MOOD: ICD-10-CM

## 2018-07-03 PROCEDURE — 90853 GROUP PSYCHOTHERAPY: CPT

## 2018-07-03 PROCEDURE — 90834 PSYTX W PT 45 MINUTES: CPT | Mod: XU

## 2018-07-03 PROCEDURE — 90853 GROUP PSYCHOTHERAPY: CPT | Performed by: MARRIAGE & FAMILY THERAPIST

## 2018-07-03 NOTE — CARE PLAN
Problem: Mood Instability Interfering with Adl’S  Goal: Stable mood and functioning  Improve mood and coping with stressors and increase understanding of depression by developing coping skills as evidenced by verbalizing three strategies to address negative thought patterns, reactivity and intensity by discharge IOP.      Intervention: Mindfulness Concepts   [facility]  Therapy Progress Note    Patient Name: Prieto Holm  Patient MRN: [id number]  Today's Date: 7/3/2018     Type of session:Individual psychotherapy  Length of session: 45 minutes  Persons in attendance:Patient    Subjective/New Info: last day and last individual session. Alfredo is addressing his divorce head on: processed using his  to communicate details of paperwork, using his  and his therapist to process feelings and using his family to fulfill family connections. With boundaries addressed Alfredo felt some sense of how to navigate this major life change. He is defining his priorities with his aging mother in [place], his daughters in [place] and his [place] family. He reports sleeping better with Melatonin and adjusts time he takes to get best nights sleep. He is feeling his depression lift, recognizing grief and loss as different. He is enjoying activities with his grandson, I.e. Quad riding. He will contact his  for spiritual guidance and he will schedule appointment with Dr Aguilar for individual therapy. He denies alcohol use is problematic and feels it is well controlled.     Objective/Observations:   Participation: Active verbal participation, Attentive, Engaged and Open to feedback   Grooming: Casual and Neat   Cognition: Alert and Fully Oriented   Eye contact: Good   Mood: Depressed   Affect: Flexible, Full range and Congruent with content   Thought process: Logical and Goal-directed   Speech: Rate within normal limits and Volume within normal limits   Other:     Diagnoses: No diagnosis found.     Current  risk:   SUICIDE: Not applicable   Homicide: Not applicable   Self-harm: Not applicable   Relapse: Not applicable   Other:    Safety Plan reviewed? Not Indicated   If evidence of imminent risk is present, intervention/plan:     Therapeutic Intervention(s): Clarify:  Clarify feelings, Clarify thoughts and Clarify values, Develop/modify treatment plan, Goal-setting, Leisure and recreation skills, Parenting/familial roles addressed, Positive behavior reinforced, Self-care skills, Stressors assessed and Supportive psychotherapy    Treatment Goal(s)/Objective(s) addressed: discharge planning and recovery goals     Progress toward Treatment Goals: Moderate improvement    Plan:  - Termination of IOP  - Patient is in agreement with the above plan:  YES    Alisha Salazar R.N.  7/3/2018

## 2018-07-03 NOTE — BH THERAPY
Group Therapy Checklist  Attendance: Attended  Attendance Duration (min):  (60 min.)  Number of Participants: [phone]  Program/Group: Intensive Outpatient Program  Topics Covered: Relaxation tech's  Participation: Active verbal participation, Attentive, Supportive to other group members  Affect/Mood Range: Flexible  Affect/Mood Display: Congruent w/content  Cognition: Oriented, Alert  Evidence of Imminent Suicide Risk: No  Evidence of imminent homicide risk: No  Therapeutic Interventions: Psychoeducation re: (Comment), Emotion clarification  Progress Toward Treatment Goal: Moderate improvement

## 2018-07-03 NOTE — BH THERAPY
Group Therapy Checklist  Attendance: Attended  Attendance Duration (min): 90  Number of Participants: 8  Program/Group: Intensive Outpatient Program  Topics Covered:  (process group)  Participation: Active verbal participation, Supportive to other group members, Attentive, Open to feedback  Affect/Mood Range: Normal range, Flexible  Affect/Mood Display: Congruent w/content  Cognition: Alert, Oriented  Evidence of Imminent Suicide Risk: No  Evidence of imminent homicide risk: No  Therapeutic Interventions: Emotion clarification, Supportive psychotherapy  Progress Toward Treatment Goal: Moderate improvement  Alfredo processed his time in program as today is his last day. He acknowledged how difficult the divorce has been for him; he is grateful that he has been in the program the past five weeks to sort through this journey and share the emotions with peers. Receptive to group farewells and good wishes.    negative...

## 2018-07-03 NOTE — TELEPHONE ENCOUNTER
[facility]  TRANSFER/DISCHARGE SUMMARY FORM    HHPI / SCP: no Other Ins.: Medicare     Patient Name: Prieto Holm  Admission Date: 18  Level of Care Attended:  Intens.OP : 1950  Transfer/Discharge Date: MRN: [id number]  7/3/18       SIGNIFICANT FINDINGS/CLINICAL IMPRESSION:   DSM Codes:   IOP    ICD10 Codes: F33.1 F41.1    Additional problems identified via assessment: divorce papers served his second day of treatment    Treatment Components in Which Patient Participated (check all that apply):  Education group(s), 1:1 teaching/therapy, Medication Management and Group Therapy    Summary of Course of Treatment: active participation education forums, process groups and individual counseling sessions.     Condition at Time of Transfer/Discharge: Met treatment goals.    [x] Medications Reviewed with Copy to Patient: Melatonin OTC    Referred to: Refer to [facility]: Outpatient Therapy and his     Patient is in agreement with discharge plan: yes    Alisha Salazar R.N.

## 2018-08-01 ENCOUNTER — OFFICE VISIT (OUTPATIENT)
Dept: BEHAVIORAL HEALTH | Facility: PHYSICIAN GROUP | Age: 68
End: 2018-08-01
Payer: MEDICARE

## 2018-08-01 DIAGNOSIS — Z63.0 MARITAL STRESS: ICD-10-CM

## 2018-08-01 DIAGNOSIS — F34.1 DYSTHYMIA: ICD-10-CM

## 2018-08-01 PROCEDURE — 90834 PSYTX W PT 45 MINUTES: CPT | Performed by: PSYCHOLOGIST

## 2018-08-01 SDOH — SOCIAL STABILITY - SOCIAL INSECURITY: PROBLEMS IN RELATIONSHIP WITH SPOUSE OR PARTNER: Z63.0

## 2018-08-16 ENCOUNTER — OFFICE VISIT (OUTPATIENT)
Dept: BEHAVIORAL HEALTH | Facility: PHYSICIAN GROUP | Age: 68
End: 2018-08-16
Payer: MEDICARE

## 2018-08-16 DIAGNOSIS — F34.1 DYSTHYMIA: ICD-10-CM

## 2018-08-16 PROCEDURE — 90834 PSYTX W PT 45 MINUTES: CPT | Performed by: PSYCHOLOGIST

## 2018-08-16 NOTE — BH THERAPY
[facility]  Therapy Progress Note    Patient Name: Prieto Holm  Patient MRN: [id number]  Today's Date: 8/1/2018     Type of session:Individual psychotherapy  Length of session: 50 minutes  Persons in attendance:Patient    Subjective/New Info: The patient recently completed the intensive outpatient program at [facility] during the course of his intensive outpatient treatment his wife filed for divorce in served him with papers. The patient is continuing to have difficulty with  his actions from his soon to be ex-wife. 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 Patient was encouraged continue behavioral activation. Starting to examine with the patient scheme is associated with relationships.     Objective/Observations:  Mental Status    Patient did not present in acute distress. Patient was appropriately groomed. Patient was alert and oriented x4. Eye contact was appropriate. No abnormalities in attention or concentration were noted. No abnormalities of movement present; psychomotor activity was normal. Speech was fluent and regular in rhythm, rate, volume, and tone. Thought processes Linear, Logical and Goal Directed. There was no evidence of thought disorder. No auditory or visual hallucinations. Long and short term memory appeared to be intact. Insight, judgment, and impulse control were deemed to be good.  Reported mood was “neutral.” Affect was full-ranging and appropriate to thought content and conversation.  Patient denied current suicidal and homicidal ideation in plan, intent, and preparatory behavior.      Diagnoses:   1. Marital stress    2. Dysthymia    The patient denied any suicide-related ideation and/or behaviors and intent/plan, denied thoughts about death and dying both in session and during the period since last appointment, or  past 2 weeks.    Risk Level: Not Currently at Clinically Significant Risk  Hospitalization is not deemed necessary at this time as the patient does not present a clear or imminent danger to self or others. No indication for pursuing higher level of care. Outpatient management is currently most appropriate and least restrictive level of care.     Current risk:   SUICIDE: Low   Homicide: Not applicable   Self-harm: Not applicable   Relapse: Not applicable   Other:    Safety Plan reviewed? No   If evidence of imminent risk is present, intervention/plan:         Treatment Goal(s)/Objective(s) addressed:     Depression   Goal: Improve overall mood   Be free of suicidal thoughts   Develop strategies for thought distraction when ruminating on the past  Increase understand of automatic thoughts related to depression  Identify depressive schema and associated automatic thought and develop skills to challenge them.     Progress toward Treatment Goals: Mild improvement    Plan:    1) The patient will return to the clinic 2-3 weeks - Next appointment scheduled:  9/19/2018.  2) Crisis Response Plan:  Reviewed emergency resources with the patient and the patient expressed understanding including:  If feeling suicidal, patient will call or present to the Behavioral Health Clinic during duty hours or present to closest ED ([facility] or [facility], call 911 or crisis hotline (1-457-242-VOFA) after duty hours.  3) Referrals/Consults:  N/A  4) Barriers to Learning:  No  5) Readiness to Learn:  Yes  6) Cultural Concerns:  No  7) Patient voiced understanding of, and agreement with, plan and goals as annotated above.  8) Declare these services are medically necessary and appropriate to the patient’s diagnosis and needs  9) The point of contact at the [facility]al Health Clinic regarding this evaluation is Dr. Aguilar, Psychologist.      Henok Aguilar III,  Ed.D.  8/1/2018

## 2018-08-21 NOTE — BH THERAPY
[facility]  Therapy Progress Note    Patient Name: Prieto Holm  Patient MRN: [id number]  Today's Date: 8/1/2018     Type of session:Individual psychotherapy  Length of session: 50 minutes  Persons in attendance:Patient    Subjective/New Info:     Interval History:   Session Focus:     Therapeutic Intervention: Cognitive Behavioral Therapy      Planned Intervention:    .    Objective/Observations:  Mental Status    Patient did not present in acute distress. Patient was appropriately groomed. Patient was alert and oriented x4. Eye contact was appropriate. No abnormalities in attention or concentration were noted. No abnormalities of movement present; psychomotor activity was normal. Speech was { Speech:84583} and { Speech Pace:22050} in rhythm, rate, volume, and tone. Thought processes Linear, Logical and Goal Directed. There was no evidence of thought disorder. No auditory or visual hallucinations. Long and short term memory appeared to be intact. Insight, judgment, and impulse control were deemed to be {Levels of Insight and Judgement:50900}.  Reported mood was “{ Mood:62930}.” Affect was full-ranging and appropriate to thought content and conversation.  Patient denied past and current suicidal and homicidal ideation in plan, intent, and preparatory behavior.    Psychometric Test Results:       M-20  Global Mental Health  { Symptoms Scale:93915}  Well Being Scale  { Symptoms Scale:77813}  Symptoms Scale  { Symptoms Scale:50004}  Anxiety Subscale  { Symptoms Scale:35987}  Depression Subscale  { Symptoms Scale:66145}  Alcohol/Drug Subscale { Symptoms Scale:04975}  Bipolar Subscale  { Symptoms Scale:30842}  Eating Disorder Subscale { Symptoms Scale:07833}  Harm to other Subscale { Symptoms Scale:79366}  Suicide Monitoring Scale { Suicide Scale:89388}  Life Functioning Scale   { Symptoms Scale:27421}          Diagnoses:   1. Dysthymia       The patient denied any  suicide-related ideation and/or behaviors and intent/plan, denied thoughts about death and dying both in session and during the period since last appointment, or past 2 weeks.      Current Risk and Protective Factors:  Psychiatric History and Current Status:    ?history of suicide attempt < 3 months;   ?history of suicide attempt   ?history of psychiatric inpatient care;   ?history of non-suicidal self-injurious behaviors;   ?depression or other mood disorders;   ?personality disorders or traits;   ?PTSD or anxiety disorders;   ?sleep disorders;   ?substance-use disorders;   ?family history of suicide - not applicable.;  ?family history of psychiatric illness;   ?psychotic disorders.      Psychological Characteristics:     ?hopelessness;   ?belongingness;   ?perceived burdensomeness;   ?acquired capability for suicide;   ?impulsivity;   ?problem-solving deficits;   ?shame;   ?guilt.      Psychosocial Stressors:    ?relationship problems;   ?legal problems;   ?financial problems;   ?work related problems;   ?lack of social support.      Physical Injury or Illness:    ?TBI;   ?physical injury;   ?chronic pain;   ?other medical problems…  Other Risk Factors:    ?male;   ? ;   ?access to lethal means;   ?combat exposure;   ?history of physical, emotional, mental and or sexual abuse;   ?sexual orientation;   ?mental health stigma and perceived barriers to care;   ?recent local cluster of suicides (consider possible contagion)      Current Protective Factors:   Psychiatric History and Status:    ?compliance with psychiatric medication;   ?engagement in evidenced-based treatment;   ?motivation and readiness to change;   ?insight about problems.      Psychological Characteristics:    ?problem solving and effective coping strategies;   ?resilience;   ?reasons for living;   ?future orientation;   ?perceived internal locus of control.    Psychosocial Factors:     ?healthy intimate relationships;   ?social  support and community involvement.     Physical Injury or Illness:    ?medical compliance;   ?able to access care as needed;   ?support for help seeking.      Other Protective Factors:    ?restricted access to lethal means;   ?Anabaptist/spirituality,   ?crisis response or other related training.      Risk Level: Not Currently at Clinically Significant Risk  Hospitalization is not deemed necessary at this time as the patient does not present a clear or imminent danger to self or others. No indication for pursuing higher level of care. Outpatient management is currently most appropriate and least restrictive level of care.     Current risk:   SUICIDE: {RB RATINGS:34862394}   Homicide: {RB RATINGS:63239793}   Self-harm: {RB RATINGS:50208634}   Relapse: {RB RATINGS:83199373}   Other:    Safety Plan reviewed? {YES/NO/NOT INDICATED:06484}   If evidence of imminent risk is present, intervention/plan:         Treatment Goal(s)/Objective(s) addressed:     Depression   Goal: Improve overall mood   Be free of suicidal thoughts   Develop strategies for thought distraction when ruminating on the past  Increase understand of automatic thoughts related to depression  Identify depressive schema and associated automatic thought and develop skills to challenge them.     Progress toward Treatment Goals: Mild improvement    Plan:  - Next appointment scheduled:  9/19/2018    Henok Aguilar III, Ed.D.  8/1/2018

## 2018-09-19 ENCOUNTER — OFFICE VISIT (OUTPATIENT)
Dept: MEDICAL GROUP | Facility: LAB | Age: 68
End: 2018-09-19
Payer: MEDICARE

## 2018-09-19 ENCOUNTER — HOSPITAL ENCOUNTER (OUTPATIENT)
Dept: LAB | Facility: MEDICAL CENTER | Age: 68
End: 2018-09-19
Attending: FAMILY MEDICINE
Payer: MEDICARE

## 2018-09-19 ENCOUNTER — OFFICE VISIT (OUTPATIENT)
Dept: BEHAVIORAL HEALTH | Facility: PHYSICIAN GROUP | Age: 68
End: 2018-09-19
Payer: MEDICARE

## 2018-09-19 VITALS
HEART RATE: 64 BPM | SYSTOLIC BLOOD PRESSURE: 142 MMHG | RESPIRATION RATE: 12 BRPM | DIASTOLIC BLOOD PRESSURE: 82 MMHG | BODY MASS INDEX: 23.98 KG/M2 | HEIGHT: 72 IN | TEMPERATURE: 97.5 F | OXYGEN SATURATION: 98 % | WEIGHT: 177 LBS

## 2018-09-19 DIAGNOSIS — F32.1 CURRENT MODERATE EPISODE OF MAJOR DEPRESSIVE DISORDER WITHOUT PRIOR EPISODE (HCC): ICD-10-CM

## 2018-09-19 DIAGNOSIS — Z63.0 MARITAL PROBLEMS: ICD-10-CM

## 2018-09-19 DIAGNOSIS — R73.01 IMPAIRED FASTING GLUCOSE: ICD-10-CM

## 2018-09-19 DIAGNOSIS — S41.101A ARM WOUND, RIGHT, INITIAL ENCOUNTER: ICD-10-CM

## 2018-09-19 DIAGNOSIS — R58 EXCESSIVE BLEEDING: ICD-10-CM

## 2018-09-19 DIAGNOSIS — I10 ESSENTIAL HYPERTENSION: ICD-10-CM

## 2018-09-19 DIAGNOSIS — F34.1 DYSTHYMIA: ICD-10-CM

## 2018-09-19 DIAGNOSIS — Z23 NEED FOR INFLUENZA VACCINATION: ICD-10-CM

## 2018-09-19 LAB
INR PPP: 0.9 (ref 0.87–1.13)
PROTHROMBIN TIME: 12.3 SEC (ref 12–14.6)

## 2018-09-19 PROCEDURE — 85610 PROTHROMBIN TIME: CPT

## 2018-09-19 PROCEDURE — 36415 COLL VENOUS BLD VENIPUNCTURE: CPT

## 2018-09-19 PROCEDURE — 90834 PSYTX W PT 45 MINUTES: CPT | Performed by: PSYCHOLOGIST

## 2018-09-19 PROCEDURE — 85025 COMPLETE CBC W/AUTO DIFF WBC: CPT

## 2018-09-19 PROCEDURE — 99215 OFFICE O/P EST HI 40 MIN: CPT | Mod: 25 | Performed by: FAMILY MEDICINE

## 2018-09-19 PROCEDURE — 84443 ASSAY THYROID STIM HORMONE: CPT

## 2018-09-19 PROCEDURE — 90662 IIV NO PRSV INCREASED AG IM: CPT | Performed by: FAMILY MEDICINE

## 2018-09-19 PROCEDURE — G0008 ADMIN INFLUENZA VIRUS VAC: HCPCS | Performed by: FAMILY MEDICINE

## 2018-09-19 PROCEDURE — 80053 COMPREHEN METABOLIC PANEL: CPT

## 2018-09-19 PROCEDURE — 83036 HEMOGLOBIN GLYCOSYLATED A1C: CPT | Mod: GA

## 2018-09-19 RX ORDER — PHENOL 1.4 %
5 AEROSOL, SPRAY (ML) MUCOUS MEMBRANE NIGHTLY PRN
COMMUNITY
End: 2020-03-30

## 2018-09-19 SDOH — SOCIAL STABILITY - SOCIAL INSECURITY: PROBLEMS IN RELATIONSHIP WITH SPOUSE OR PARTNER: Z63.0

## 2018-09-19 NOTE — PROGRESS NOTES
Subjective:   Prieto Holm is a 67 y.o. male here today for depression/grief, bleeding    1. Current moderate episode of major depressive disorder without prior episode (HCC)  New to discuss with me  Recently  from wife  Divorce papers were filed by his wife  He is feeling very depressed, has lost weight  Did a 6 week program with [facility] - was helpful  [place] [place] [facility]   He is still having a significant amount of sadness and is now feeling more angry.  He is drinking alcohol in excess now.  He is drinking about 4 beers per day, sometimes more  No feelings of suicide    2. Need for influenza vaccination  Patient would like his flu shot    3. Essential hypertension  This is chronic. Elevated initially today. Currently taking lisinopril 20 mg daily as directed. Also taking baby aspirin. Denies lightheadedness, vision changes, headache, palpitations, chest pain, or leg swelling.    4. Impaired fasting glucose  This is a chronic problem.  Patient had elevated hemoglobin A1c.  He has lost weight but this has been an unhealthy weight loss.  He is drinking alcohol in excess and is not eating healthy foods.  He did not get his labs done due to increased life stress    5.  Excessive bleeding  This is a new problem to discuss.  Patient states that over the last couple of months he has noticed that anytime he makes himself he is bleeding significantly.  He has not had any aspirin.  He is not taking any NSAIDs regularly.  He is drinking more alcohol.  No bleeding from his gums.  No blood in the stool.  No blood in the urine    Current medicines (including changes today)  Current Outpatient Prescriptions   Medication Sig Dispense Refill   • Melatonin 10 MG Tab Take  by mouth.     • lisinopril (PRINIVIL) 20 MG Tab Take 1 Tab by mouth every day. 90 Tab 3   • atorvastatin (LIPITOR) 80 MG tablet Take 1 Tab by mouth every evening. 90 Tab 3   • colchicine (COLCRYS) 0.6 MG Tab Take 2 tabs PO  once then 1 tab PO 1 hour later prn gout attack 3 Tab 2   • indomethacin (INDOCIN) 25 MG Cap Take 1 Cap by mouth 3 times a day as needed (gout). 90 Cap 0     No current facility-administered medications for this visit.      He  has a past medical history of Bladder cancer (HCC); Cancer (HCC); Depression; Gout; Hyperlipidemia; Hypertension; and Lip injury. He also has no past medical history of Anxiety; Psychosis; Self-injurious behavior; or Suicide attempt (HCC).    ROS   No fevers  No bowel changes  No LE edema       Objective:     Blood pressure 142/82, pulse 64, temperature 36.4 °C (97.5 °F), temperature source Temporal, resp. rate 12, height 1.829 m (6'), weight 80.3 kg (177 lb), SpO2 98 %. Body mass index is 24.01 kg/m².  Physical Exam:  Constitutional: Alert, no distress.  Skin: Warm, dry, good turgor, there is an 8 cm laceration on the right arm without surrounding erythema.  It does bleed easily when the bandage is removed  Eye: Equal, round and reactive, conjunctiva clear, lids normal.  ENMT: Lips without lesions, good dentition, oropharynx clear.  Neck: Trachea midline, no masses, no thyromegaly. No cervical or supraclavicular lymphadenopathy  Respiratory: Unlabored respiratory effort, lungs clear to auscultation, no wheezes, no ronchi.  Cardiovascular: Normal S1, S2, RRR, no murmur, no edema.  Abdomen: Soft, non-tender, no masses, no hepatosplenomegaly.  Psych: Alert and oriented x3, upset and tearful at times      Assessment and Plan:   The following treatment plan was discussed    1. Current moderate episode of major depressive disorder without prior episode (HCC)  This is a new problem to discuss with me, uncontrolled.  He has declined medication management.  He is status post 6 week inpatient/outpatient behavioral health program.  He is seeing Dr. miles regularly we will continue to support this.  Discussed importance of exercise and alcohol cessation - CBC WITH DIFFERENTIAL; Future  - PROTHROMBIN  TIME; Future  - TSH WITH REFLEX TO FT4; Future  - COMP METABOLIC PANEL; Future  - HEMOGLOBIN A1C; Future    2. Essential hypertension  chronic, slightly elevated today, likely secondary to stress  - CBC WITH DIFFERENTIAL; Future  - PROTHROMBIN TIME; Future  - TSH WITH REFLEX TO FT4; Future  - COMP METABOLIC PANEL; Future  - HEMOGLOBIN A1C; Future    3. Impaired fasting glucose  Chronic, currently stable.  Diet and alcohol recommendations given today.  Exercise may be helpful for this and his depression  - CBC WITH DIFFERENTIAL; Future  - PROTHROMBIN TIME; Future  - TSH WITH REFLEX TO FT4; Future  - COMP METABOLIC PANEL; Future  - HEMOGLOBIN A1C; Future    4. Excessive bleeding  This is a new problem, possibly secondary to increased alcohol intake and poor diet.  We are going to check labs as below  - CBC WITH DIFFERENTIAL; Future  - PROTHROMBIN TIME; Future  - TSH WITH REFLEX TO FT4; Future  - COMP METABOLIC PANEL; Future  - HEMOGLOBIN A1C; Future    5. Arm wound, right, initial encounter  This is a new problem.  Patient has a superficial laceration on the right arm.  We have redressed this today.  No sutures are needed.  It does bleed very easily with removal of the Band-Aid and this cut was here for 3 days    6. Need for influenza vaccination  - INFLUENZA VACCINE, HIGH DOSE (65+ ONLY)        Followup: Return in about 3 months (around 12/19/2018).       This note was created using voice recognition software. I have made every reasonable attempt to correct errors, however, I do anticipate some grammatical errors.     Total 40 minutes face-to-face time spent with patient not including any procedure, with greater than 50% of the total time discussing patient's issues and symptoms as listed above in assessment and plan, as well as managing coordination of care for future evaluation and treatment.

## 2018-09-20 LAB
ALBUMIN SERPL BCP-MCNC: 4.2 G/DL (ref 3.2–4.9)
ALBUMIN/GLOB SERPL: 2 G/DL
ALP SERPL-CCNC: 69 U/L (ref 30–99)
ALT SERPL-CCNC: 19 U/L (ref 2–50)
ANION GAP SERPL CALC-SCNC: 9 MMOL/L (ref 0–11.9)
AST SERPL-CCNC: 18 U/L (ref 12–45)
BASOPHILS # BLD AUTO: 0.4 % (ref 0–1.8)
BASOPHILS # BLD: 0.03 K/UL (ref 0–0.12)
BILIRUB SERPL-MCNC: 1 MG/DL (ref 0.1–1.5)
BUN SERPL-MCNC: 10 MG/DL (ref 8–22)
CALCIUM SERPL-MCNC: 9.6 MG/DL (ref 8.5–10.5)
CHLORIDE SERPL-SCNC: 105 MMOL/L (ref 96–112)
CO2 SERPL-SCNC: 24 MMOL/L (ref 20–33)
CREAT SERPL-MCNC: 0.82 MG/DL (ref 0.5–1.4)
EOSINOPHIL # BLD AUTO: 0.08 K/UL (ref 0–0.51)
EOSINOPHIL NFR BLD: 1.1 % (ref 0–6.9)
ERYTHROCYTE [DISTWIDTH] IN BLOOD BY AUTOMATED COUNT: 47.6 FL (ref 35.9–50)
EST. AVERAGE GLUCOSE BLD GHB EST-MCNC: 123 MG/DL
FASTING STATUS PATIENT QL REPORTED: NORMAL
GLOBULIN SER CALC-MCNC: 2.1 G/DL (ref 1.9–3.5)
GLUCOSE SERPL-MCNC: 99 MG/DL (ref 65–99)
HBA1C MFR BLD: 5.9 % (ref 0–5.6)
HCT VFR BLD AUTO: 44.3 % (ref 42–52)
HGB BLD-MCNC: 14.7 G/DL (ref 14–18)
IMM GRANULOCYTES # BLD AUTO: 0.03 K/UL (ref 0–0.11)
IMM GRANULOCYTES NFR BLD AUTO: 0.4 % (ref 0–0.9)
LYMPHOCYTES # BLD AUTO: 1.68 K/UL (ref 1–4.8)
LYMPHOCYTES NFR BLD: 23.5 % (ref 22–41)
MCH RBC QN AUTO: 31.4 PG (ref 27–33)
MCHC RBC AUTO-ENTMCNC: 33.2 G/DL (ref 33.7–35.3)
MCV RBC AUTO: 94.7 FL (ref 81.4–97.8)
MONOCYTES # BLD AUTO: 0.54 K/UL (ref 0–0.85)
MONOCYTES NFR BLD AUTO: 7.6 % (ref 0–13.4)
NEUTROPHILS # BLD AUTO: 4.78 K/UL (ref 1.82–7.42)
NEUTROPHILS NFR BLD: 67 % (ref 44–72)
NRBC # BLD AUTO: 0 K/UL
NRBC BLD-RTO: 0 /100 WBC
PLATELET # BLD AUTO: 212 K/UL (ref 164–446)
PMV BLD AUTO: 11.7 FL (ref 9–12.9)
POTASSIUM SERPL-SCNC: 4.2 MMOL/L (ref 3.6–5.5)
PROT SERPL-MCNC: 6.3 G/DL (ref 6–8.2)
RBC # BLD AUTO: 4.68 M/UL (ref 4.7–6.1)
SODIUM SERPL-SCNC: 138 MMOL/L (ref 135–145)
TSH SERPL DL<=0.005 MIU/L-ACNC: 0.83 UIU/ML (ref 0.38–5.33)
WBC # BLD AUTO: 7.1 K/UL (ref 4.8–10.8)

## 2018-09-20 NOTE — BH THERAPY
[facility]  Therapy Progress Note    Patient Name: Prieto Holm  Patient MRN: [id number]  Today's Date: 9/19/2018     Type of session:Individual psychotherapy  Length of session: 50 minutes  Persons in attendance:Patient    Subjective/New Info:      Objective/Observations:   Patient continues to make good progress in the divorce is moving forward recently had his first hearing with the court. Patient reports most of his depressive symptoms have resolved and he is starting have some problems with insomnia. He now working part-time which has helped with his symptoms. Patient has been able to establish better boundaries and his relationship with his soon to be ex-wife but at times feels awkward. The patient was interested in participating in the divorce support group information about a program was provided. Patient is doing a very good job of challenging negative automatic thoughts and taking responsibility for what part of the failure of the marriage versus taking all the responsibility which is been a problem in the past.    Patient did not present in acute distress. Patient was appropriately groomed. Patient was alert and oriented x4. Eye contact was appropriate. No abnormalities in attention or concentration were noted. No abnormalities of movement present; psychomotor activity was normal. Speech was fluent and regular in rhythm, rate, volume, and tone. Thought processes Linear, Logical and Goal Directed. There was no evidence of thought disorder. No auditory or visual hallucinations. Long and short term memory appeared to be intact. Insight, judgment, and impulse control were deemed to be good.  Reported mood was “euthymic.” Affect was full-ranging and appropriate to thought content and conversation.  Patient denied current suicidal and homicidal ideation in plan, intent, and preparatory behavior.    Psychometric Test Results:       BHM-20  Global Mental Health  Mild Distress  Well Being  Scale  Mild Distress  Symptoms Scale  Mild Distress  Anxiety Subscale  Within Normal Limits  Depression Subscale  Mild Distress  Alcohol/Drug Subscale Within Normal Limits  Bipolar Subscale  Within Normal Limits  Eating Disorder Subscale Within Normal Limits  Harm to other Subscale Within Normal Limits  Suicide Monitoring Scale Low Risk  Life Functioning Scale   Mild Distress        Diagnoses:   1. Dysthymia    2. Marital problems    The patient denied any suicide-related ideation and/or behaviors and intent/plan, denied thoughts about death and dying both in session and during the period since last appointment, or past 2 weeks.    Risk Level: Not Currently at Clinically Significant Risk  Hospitalization is not deemed necessary at this time as the patient does not present a clear or imminent danger to self or others. No indication for pursuing higher level of care. Outpatient management is currently most appropriate and least restrictive level of care.     Current risk:   SUICIDE: Low   Homicide: Not applicable   Self-harm: Not applicable   Relapse: Not applicable   Other:    Safety Plan reviewed? No   If evidence of imminent risk is present, intervention/plan:         Treatment Goal(s)/Objective(s) addressed:     Depression   Goal: Improve overall mood   Be free of suicidal thoughts   Develop strategies for thought distraction when ruminating on the past  Increase understand of automatic thoughts related to depression  Identify depressive schema and associated automatic thought and develop skills to challenge them.     Progress toward Treatment Goals: Mild improvement    Plan:    1) The patient will return to the clinic 3-4 weeks   2) Crisis Response Plan:  Reviewed emergency resources with the patient and the patient expressed understanding including:  If feeling suicidal, patient will call or present to the [facility] during duty hours or present to closest ED ([facility] or  [facility], call 911 or crisis hotline ([phone]) after duty hours.  3) Referrals/Consults:  N/A  4) Barriers to Learning:  No  5) Readiness to Learn:  Yes  6) Cultural Concerns:  No  7) Patient voiced understanding of, and agreement with, plan and goals as annotated above.  8) Declare these services are medically necessary and appropriate to the patient’s diagnosis and needs  9) The point of contact at the [facility] regarding this evaluation is Dr. Aguilar, Psychologist.      Henok Aguilar III, Ed.D.  9/19/2018

## 2018-10-16 ENCOUNTER — TELEPHONE (OUTPATIENT)
Dept: MEDICAL GROUP | Facility: LAB | Age: 68
End: 2018-10-16

## 2018-10-16 NOTE — TELEPHONE ENCOUNTER
Spoke with pt informed him on message regarding mediation. Offered him an appt for Thursday he declined states he will be going out of town. I did urge him to go to urgent care if he is not feeling better.

## 2018-10-16 NOTE — TELEPHONE ENCOUNTER
Pt denies fever. He has chills, no energy. No appetite. Not sure what to eat. Has taking OTC Imodium diarrhea caplets. Started with one. He took two this morning.

## 2018-10-19 ENCOUNTER — TELEPHONE (OUTPATIENT)
Dept: MEDICAL GROUP | Facility: LAB | Age: 68
End: 2018-10-19

## 2018-10-19 NOTE — TELEPHONE ENCOUNTER
ESTABLISHED PATIENT PRE-VISIT PLANNING     Note: Patient will not be contacted if there is no indication to call.     1.  Reviewed notes from the last few office visits within the medical group: Yes    2.  If any orders were placed at last visit or intended to be done for this visit (i.e. 6 mos follow-up), do we have Results/Consult Notes?        •  Labs - Labs ordered, completed on 9/19/18 and results are in chart.       •  Imaging - Imaging was not ordered at last office visit.       •  Referrals - No referrals were ordered at last office visit.    3. Is this appointment scheduled as a Hospital Follow-Up? No    4.  Immunizations were updated in Epic using WebIZ?: Epic matches WebIZ       •  Web Iz Recommendations: ZOSTAVAX (Shingles)    5.  Patient is due for the following Health Maintenance Topics:   Health Maintenance Due   Topic Date Due   • COLON CANCER SCREENING ANNUAL FIT  10/25/2001   • IMM ZOSTER VACCINES (1 of 2) 10/25/2001       - Patient has completed FLU, PREVNAR (PCV13)  and TDAP Immunization(s) per WebIZ. Chart has been updated.    6.  MDX printed for Provider? NO    7.  Patient was NOT informed to arrive 15 min prior to their scheduled appointment and bring in their medication bottles.

## 2018-10-22 ENCOUNTER — OFFICE VISIT (OUTPATIENT)
Dept: MEDICAL GROUP | Facility: LAB | Age: 68
End: 2018-10-22
Payer: MEDICARE

## 2018-10-22 ENCOUNTER — TELEPHONE (OUTPATIENT)
Dept: MEDICAL GROUP | Facility: LAB | Age: 68
End: 2018-10-22

## 2018-10-22 VITALS
RESPIRATION RATE: 18 BRPM | HEART RATE: 56 BPM | BODY MASS INDEX: 23.68 KG/M2 | HEIGHT: 72 IN | SYSTOLIC BLOOD PRESSURE: 122 MMHG | OXYGEN SATURATION: 98 % | WEIGHT: 174.8 LBS | DIASTOLIC BLOOD PRESSURE: 70 MMHG | TEMPERATURE: 97.7 F

## 2018-10-22 DIAGNOSIS — R19.7 DIARRHEA, UNSPECIFIED TYPE: ICD-10-CM

## 2018-10-22 DIAGNOSIS — Z12.11 COLON CANCER SCREENING: ICD-10-CM

## 2018-10-22 DIAGNOSIS — I10 ESSENTIAL HYPERTENSION: ICD-10-CM

## 2018-10-22 DIAGNOSIS — E78.2 MIXED HYPERLIPIDEMIA: ICD-10-CM

## 2018-10-22 PROCEDURE — 99204 OFFICE O/P NEW MOD 45 MIN: CPT | Performed by: INTERNAL MEDICINE

## 2018-10-22 RX ORDER — LISINOPRIL 20 MG/1
20 TABLET ORAL DAILY
Qty: 90 TAB | Refills: 3 | Status: SHIPPED | OUTPATIENT
Start: 2018-10-22 | End: 2020-03-30 | Stop reason: SDUPTHER

## 2018-10-22 RX ORDER — ATORVASTATIN CALCIUM 80 MG/1
80 TABLET, FILM COATED ORAL EVERY EVENING
Qty: 90 TAB | Refills: 3 | Status: SHIPPED | OUTPATIENT
Start: 2018-10-22 | End: 2019-08-07

## 2018-10-22 ASSESSMENT — PATIENT HEALTH QUESTIONNAIRE - PHQ9
6. FEELING BAD ABOUT YOURSELF - OR THAT YOU ARE A FAILURE OR HAVE LET YOURSELF OR YOUR FAMILY DOWN: NOT AL ALL
9. THOUGHTS THAT YOU WOULD BE BETTER OFF DEAD, OR OF HURTING YOURSELF: NOT AT ALL
4. FEELING TIRED OR HAVING LITTLE ENERGY: NOT AT ALL
1. LITTLE INTEREST OR PLEASURE IN DOING THINGS: NOT AT ALL
SUM OF ALL RESPONSES TO PHQ QUESTIONS 1-9: 0
8. MOVING OR SPEAKING SO SLOWLY THAT OTHER PEOPLE COULD HAVE NOTICED. OR THE OPPOSITE, BEING SO FIGETY OR RESTLESS THAT YOU HAVE BEEN MOVING AROUND A LOT MORE THAN USUAL: NOT AT ALL
5. POOR APPETITE OR OVEREATING: NOT AT ALL
3. TROUBLE FALLING OR STAYING ASLEEP OR SLEEPING TOO MUCH: NOT AT ALL
SUM OF ALL RESPONSES TO PHQ9 QUESTIONS 1 AND 2: 0
2. FEELING DOWN, DEPRESSED, IRRITABLE, OR HOPELESS: NOT AT ALL
7. TROUBLE CONCENTRATING ON THINGS, SUCH AS READING THE NEWSPAPER OR WATCHING TELEVISION: NOT AT ALL

## 2018-10-22 NOTE — ASSESSMENT & PLAN NOTE
New to me, chronic controlled.  He is currently taking Lipitor 80 mg daily.  Has been tolerating it well.  Reviewed his most recent lipid panel from September and it is within goal.  Lab Results   Component Value Date/Time    CHOLSTRLTOT 127 05/14/2018 06:06 AM    CHOLSTRLTOT 181 04/12/2017 07:01 AM    LDL 67 05/14/2018 06:06 AM    LDL 95 04/12/2017 07:01 AM    HDL 48 05/14/2018 06:06 AM    HDL 53 04/12/2017 07:01 AM    TRIGLYCERIDE 61 05/14/2018 06:06 AM    TRIGLYCERIDE 166 (H) 04/12/2017 07:01 AM

## 2018-10-22 NOTE — PROGRESS NOTES
Chief Complaint   Patient presents with   • Diarrhea   • Med refill       Subjective:     History of Present Illness: Patient is a 67 y.o. male. This pleasant patient of Dr. Jain who is here today for an acute visit.  Diarrhea  Pt reports diarrhea for 8 days. It occurs 2 times per day and is loose in consistency. No blood or mucous.  Has been taking OTC Imodium without help. Feeling bloated and gasy sensation. No cramps or abdominal pain.   He ate at "[employer] and [employer] and a chicken from [employer].   No nausea or vomiting.   Going through divorce and allot of stress. Has been working 6 days a week to keep himself occupied.   Denies melena/hamtochezia, fever, abdominal pain, weight loss, recent antibiotic use or recent hospitalization. Pt is not immunocompromised.   Sick contacts: none  Artificial sweetners usage: none.    Had a colonoscopy over 7 yrs ago. Due for repeat but declined. Willing to do stool test. Denied wt loss, fatigue, fever.             Hypertension  New to me, chronic controlled.  His blood pressure is currently well controlled on lisinopril 20 mg daily.  He denies any side effects from this medication.  He is compliant with his pills.  His blood pressure in the office is within goal.  He denies symptoms of high or low blood pressures such as headache, chest pain, shortness of breath or lower extremity edema or dizziness.    Hyperlipidemia  New to me, chronic controlled.  He is currently taking Lipitor 80 mg daily.  Has been tolerating it well.  Reviewed his most recent lipid panel from September and it is within goal.  Lab Results   Component Value Date/Time    CHOLSTRLTOT 127 05/14/2018 06:06 AM    CHOLSTRLTOT 181 04/12/2017 07:01 AM    LDL 67 05/14/2018 06:06 AM    LDL 95 04/12/2017 07:01 AM    HDL 48 05/14/2018 06:06 AM    HDL 53 04/12/2017 07:01 AM    TRIGLYCERIDE 61 05/14/2018 06:06 AM    TRIGLYCERIDE 166 (H) 04/12/2017 07:01 AM               Allergies: Patient has no known  allergies.    Current Outpatient Prescriptions Ordered in [facility]   Medication Sig Dispense Refill   • lisinopril (PRINIVIL) 20 MG Tab Take 1 Tab by mouth every day. 90 Tab 3   • atorvastatin (LIPITOR) 80 MG tablet Take 1 Tab by mouth every evening. 90 Tab 3   • Melatonin 10 MG Tab Take  by mouth.     • colchicine (COLCRYS) 0.6 MG Tab Take 2 tabs PO once then 1 tab PO 1 hour later prn gout attack 3 Tab 2   • indomethacin (INDOCIN) 25 MG Cap Take 1 Cap by mouth 3 times a day as needed (gout). 90 Cap 0     No current Epic-ordered facility-administered medications on file.        Past Medical History:   Diagnosis Date   • Bladder cancer (HCC)     s/p tumor resection and BCG in 2010   • Cancer (HCC)    • Depression    • Gout    • Hyperlipidemia    • Hypertension    • Lip injury     feb. 2017       Past Surgical History:   Procedure Laterality Date   • BLADDER BIOPSY WITH CYSTOSCOPY      bladder cancer excision   • MENISCUS REPAIR     • OTHER ORTHOPEDIC SURGERY Left     elbow surgery       Social History   Substance Use Topics   • Smoking status: Former Smoker     Packs/day: 1.00     Years: 35.00     Types: Cigarettes     Quit date: 10/18/2010   • Smokeless tobacco: Never Used   • Alcohol use Yes       Family History   Problem Relation Age of Onset   • Thyroid Mother    • Alzheimer's Disease Father    • Other Other         autism spectrum disorder       ROS:     - Constitutional: Negative for fever, chills, unexpected weight change, and fatigue/generalized weakness.     - HEENT: Negative for headaches, vision changes, hearing changes, ear pain, ear discharge, sinus congestion, sore throat, and neck pain.      - Respiratory: Negative for cough, sputum production, dyspnea and wheezing.    - Cardiovascular: Negative for chest pain, palpitations, orthopnea, and bilateral lower extremity edema.     - Gastrointestinal: Per HPI.  - Genitourinary: Negative for dysuria, polyuria, hematuria, pyuria, urinary urgency, and urinary  incontinence.    - Musculoskeletal: Negative for myalgias, back pain, and joint pain.     - Skin: Negative for rash, itching, cyanotic skin color change.     - Neurological: Negative for dizziness, tingling, tremors, focal sensory deficit, focal weakness and headaches.     - Endo/Heme/Allergies: Does not bruise/bleed easily.     - Psychiatric/Behavioral: Negative for depression, suicidal/homicidal ideation and memory loss.        - NOTE: All other systems reviewed and are negative, except as in HPI.       Physical Exam:     Blood pressure 122/70, pulse (!) 56, temperature 36.5 °C (97.7 °F), temperature source Temporal, resp. rate 18, height 1.829 m (6'), weight 79.3 kg (174 lb 12.8 oz), SpO2 98 %. Body mass index is 23.71 kg/m².   General: Normal appearing. No distress.  HEENT: Normocephalic. Eyes conjunctiva clear lids without ptosis, pupils equal and reactive to light accommodation, ears normal shape and contour, canals are clear bilaterally, tympanic membranes are benign,  oropharynx is without erythema, edema or exudates.    Neck: Supple without JVD or bruit. Thyroid is not enlarged.  Pulmonary: Clear to ausculation.  Normal effort. No rales, ronchi, or wheezing.  Cardiovascular: Regular rate and rhythm without murmur. Radial pulses are intact, regular and symmetrical bilaterally.  Abdomen: Soft, nondistended.  Mild tenderness to palpation over bilateral lower quadrants.  Normal bowel sounds. Liver and spleen are not palpable.  Neurologic: Grossly non-focal.  Lymph: No cervical, occipital or supraclavicular lymph nodes are palpable  Skin: Warm and dry.  No obvious lesions.  Musculoskeletal: Normal gait. No extremity cyanosis, clubbing, or edema.  Psych: Normal mood and affect. Alert and oriented x3. Judgment and insight is normal.    Data:     LABS: 9/2018: Results reviewed and discussed with the patient, questions answered.      Assessment and Plan:     1. Diarrhea, unspecified type  New, uncontrolled  problem.  8 days history of loose stools and mild abdominal cramps.  No red flag symptoms at this point but he is not up-to-date on his colon cancer screening.  Vitals are stable and clinically well-hydrated.  Preceded by eating fast food, recommended supportive care with hydration, electrolytes drinks rates as Gatorade, okay to use as needed Imodium if needed.  Will check [place] guard testing to update colon cancer screening.  He opted against colonoscopy at this time.  Advised him to schedule a follow-up if no improvement in 3 weeks to consider aggressive testing.    2. Essential hypertension  New to me, chronic controlled.  Continue on lisinopril 10 mg daily.  Will refill his prescription.  - lisinopril (PRINIVIL) 20 MG Tab; Take 1 Tab by mouth every day.  Dispense: 90 Tab; Refill: 3    3. Mixed hyperlipidemia  New to me, chronic controlled.  Continue Lipitor 80 mg daily, well-tolerated.  Will update his prescription.  - atorvastatin (LIPITOR) 80 MG tablet; Take 1 Tab by mouth every evening.  Dispense: 90 Tab; Refill: 3    4. Colon cancer screening  Had colonoscopy before, opted against repeat.  Like to proceed with Colocort testing.  - COLOGUARD (FIT DNA)      Health Maintenance:      Completed  Health Maintenance Due   Topic   • COLON CANCER SCREENING ANNUAL FIT    • IMM ZOSTER VACCINES (1 of 2)       Follow Up:      Return for PRN with PCP.    Please note that this dictation was created using voice recognition software. I have made every reasonable attempt to correct obvious errors, but I expect that there are errors of grammar and possibly content that I did not discover before finalizing the note.    Signed by: Vika Rodriges M.D.

## 2018-10-22 NOTE — TELEPHONE ENCOUNTER
· Cologard Order paperwork was faxed with provider signature. Patient's first Colloguared may be     · All appropriate fields completed by Medical Assistant: Yes    · Paperwork completed, faxed and scanned

## 2018-10-22 NOTE — ASSESSMENT & PLAN NOTE
New to me, chronic controlled.  His blood pressure is currently well controlled on lisinopril 20 mg daily.  He denies any side effects from this medication.  He is compliant with his pills.  His blood pressure in the office is within goal.  He denies symptoms of high or low blood pressures such as headache, chest pain, shortness of breath or lower extremity edema or dizziness.

## 2018-10-22 NOTE — ASSESSMENT & PLAN NOTE
Pt reports diarrhea for 8 days. It occurs 2 times per day and is loose in consistency. No blood or mucous.  Has been taking OTC Imodium without help. Feeling bloated and gasy sensation. No cramps or abdominal pain.   He ate at [email or web address] and a chicken from [employer].   No nausea or vomiting.   Going through divorce and allot of stress. Has been working 6 days a week to keep himself occupied.   Denies melena/hamtochezia, fever, abdominal pain, weight loss, recent antibiotic use or recent hospitalization. Pt is not immunocompromised.   Sick contacts: none  Artificial sweetners usage: none.    Had a colonoscopy over 7 yrs ago. Due for repeat but declined. Willing to do stool test. Denied wt loss, fatigue, fever.

## 2018-11-01 DIAGNOSIS — R19.5 POSITIVE OCCULT STOOL BLOOD TEST: ICD-10-CM

## 2018-12-15 DIAGNOSIS — I10 ESSENTIAL HYPERTENSION: ICD-10-CM

## 2018-12-15 DIAGNOSIS — E78.2 MIXED HYPERLIPIDEMIA: ICD-10-CM

## 2018-12-16 NOTE — TELEPHONE ENCOUNTER
Was the patient seen in the last year in this department? Yes lov 10/22/18    Does patient have an active prescription for medications requested? No     Received Request Via: Pharmacy

## 2018-12-17 RX ORDER — ATORVASTATIN CALCIUM 80 MG/1
TABLET, FILM COATED ORAL
Qty: 90 TAB | Refills: 3 | Status: SHIPPED | OUTPATIENT
Start: 2018-12-17 | End: 2019-05-15 | Stop reason: SDUPTHER

## 2018-12-17 RX ORDER — LISINOPRIL 20 MG/1
TABLET ORAL
Qty: 90 TAB | Refills: 3 | Status: SHIPPED | OUTPATIENT
Start: 2018-12-17 | End: 2019-05-15

## 2018-12-26 ENCOUNTER — TELEPHONE (OUTPATIENT)
Dept: MEDICAL GROUP | Facility: LAB | Age: 68
End: 2018-12-26

## 2018-12-26 NOTE — TELEPHONE ENCOUNTER
ESTABLISHED PATIENT PRE-VISIT PLANNING     Patient was NOT contacted to complete PVP.     Note: Patient will not be contacted if there is no indication to call.     1.  Reviewed notes from the last few office visits within the medical group: Yes    2.  If any orders were placed at last visit or intended to be done for this visit (i.e. 6 mos follow-up), do we have Results/Consult Notes?        •  Labs - Labs ordered, completed on 11/1/18 and results are in chart.       •  Imaging - Imaging was not ordered at last office visit.       •  Referrals - No referrals were ordered at last office visit.    3. Is this appointment scheduled as a Hospital Follow-Up? No    4.  Immunizations were updated in Epic using WebIZ?: Epic matches WebIZ       •  Web Iz Recommendations: PNEUMOVAX (PPSV23) and SHINGRIX (Shingles)    5.  Patient is due for the following Health Maintenance Topics:   Health Maintenance Due   Topic Date Due   • COLON CANCER SCREENING ANNUAL FIT  10/25/2001   • IMM ZOSTER VACCINES (1 of 2) 10/25/2001   • IMM PNEUMOCOCCAL 65+ (ADULT) LOW/MEDIUM RISK SERIES (2 of 2 - PPSV23) 11/15/2018   • Annual Wellness Visit  11/16/2018       - Patient has completed FLU, PREVNAR (PCV13)  and TDAP Immunization(s) per WebIZ. Chart has been updated.    6.  MDX printed for Provider? NO    7.  Patient was NOT informed to arrive 15 min prior to their scheduled appointment and bring in their medication bottles.

## 2019-05-02 ENCOUNTER — TELEPHONE (OUTPATIENT)
Dept: MEDICAL GROUP | Facility: LAB | Age: 69
End: 2019-05-02

## 2019-05-02 NOTE — TELEPHONE ENCOUNTER
1. Caller Name: Prieto Holm      Call Back Number: [phone] (home)         Patient approves a detailed voicemail message: N\A    Called Alfredo and LVM asking him to call back in regards to rescheduling his appointment.

## 2019-05-08 ENCOUNTER — OFFICE VISIT (OUTPATIENT)
Dept: MEDICAL GROUP | Facility: LAB | Age: 69
End: 2019-05-08
Payer: MEDICARE

## 2019-05-08 ENCOUNTER — HOSPITAL ENCOUNTER (OUTPATIENT)
Dept: LAB | Facility: MEDICAL CENTER | Age: 69
End: 2019-05-08
Attending: FAMILY MEDICINE
Payer: MEDICARE

## 2019-05-08 VITALS
WEIGHT: 175 LBS | HEIGHT: 72 IN | HEART RATE: 60 BPM | TEMPERATURE: 98 F | RESPIRATION RATE: 14 BRPM | OXYGEN SATURATION: 99 % | SYSTOLIC BLOOD PRESSURE: 130 MMHG | DIASTOLIC BLOOD PRESSURE: 70 MMHG | BODY MASS INDEX: 23.7 KG/M2

## 2019-05-08 DIAGNOSIS — Z91.89 OTHER SPECIFIED PERSONAL RISK FACTORS, NOT ELSEWHERE CLASSIFIED: ICD-10-CM

## 2019-05-08 DIAGNOSIS — R19.5 POSITIVE COLORECTAL CANCER SCREENING USING COLOGUARD TEST: ICD-10-CM

## 2019-05-08 DIAGNOSIS — E78.2 MIXED HYPERLIPIDEMIA: ICD-10-CM

## 2019-05-08 DIAGNOSIS — I10 ESSENTIAL HYPERTENSION: ICD-10-CM

## 2019-05-08 DIAGNOSIS — E13.9 OTHER SPECIFIED DIABETES MELLITUS WITHOUT COMPLICATIONS (HCC): ICD-10-CM

## 2019-05-08 DIAGNOSIS — R63.4 WEIGHT LOSS: ICD-10-CM

## 2019-05-08 DIAGNOSIS — E11.9 CONTROLLED TYPE 2 DIABETES MELLITUS WITHOUT COMPLICATION, WITHOUT LONG-TERM CURRENT USE OF INSULIN (HCC): ICD-10-CM

## 2019-05-08 DIAGNOSIS — R73.01 IMPAIRED FASTING GLUCOSE: ICD-10-CM

## 2019-05-08 LAB
ALBUMIN SERPL BCP-MCNC: 4.2 G/DL (ref 3.2–4.9)
ALBUMIN/GLOB SERPL: 1.9 G/DL
ALP SERPL-CCNC: 60 U/L (ref 30–99)
ALT SERPL-CCNC: 19 U/L (ref 2–50)
ANION GAP SERPL CALC-SCNC: 6 MMOL/L (ref 0–11.9)
AST SERPL-CCNC: 16 U/L (ref 12–45)
BASOPHILS # BLD AUTO: 0.6 % (ref 0–1.8)
BASOPHILS # BLD: 0.04 K/UL (ref 0–0.12)
BILIRUB SERPL-MCNC: 0.8 MG/DL (ref 0.1–1.5)
BUN SERPL-MCNC: 15 MG/DL (ref 8–22)
CALCIUM SERPL-MCNC: 10.3 MG/DL (ref 8.5–10.5)
CHLORIDE SERPL-SCNC: 105 MMOL/L (ref 96–112)
CHOLEST SERPL-MCNC: 167 MG/DL (ref 100–199)
CO2 SERPL-SCNC: 25 MMOL/L (ref 20–33)
CREAT SERPL-MCNC: 0.89 MG/DL (ref 0.5–1.4)
EOSINOPHIL # BLD AUTO: 0.17 K/UL (ref 0–0.51)
EOSINOPHIL NFR BLD: 2.4 % (ref 0–6.9)
ERYTHROCYTE [DISTWIDTH] IN BLOOD BY AUTOMATED COUNT: 46.3 FL (ref 35.9–50)
FASTING STATUS PATIENT QL REPORTED: NORMAL
GLOBULIN SER CALC-MCNC: 2.2 G/DL (ref 1.9–3.5)
GLUCOSE SERPL-MCNC: 110 MG/DL (ref 65–99)
HCT VFR BLD AUTO: 46.9 % (ref 42–52)
HDLC SERPL-MCNC: 67 MG/DL
HGB BLD-MCNC: 15.5 G/DL (ref 14–18)
IMM GRANULOCYTES # BLD AUTO: 0.02 K/UL (ref 0–0.11)
IMM GRANULOCYTES NFR BLD AUTO: 0.3 % (ref 0–0.9)
IRON SATN MFR SERPL: 27 % (ref 15–55)
IRON SERPL-MCNC: 95 UG/DL (ref 50–180)
LDLC SERPL CALC-MCNC: 76 MG/DL
LYMPHOCYTES # BLD AUTO: 2.1 K/UL (ref 1–4.8)
LYMPHOCYTES NFR BLD: 30.1 % (ref 22–41)
MCH RBC QN AUTO: 30.8 PG (ref 27–33)
MCHC RBC AUTO-ENTMCNC: 33 G/DL (ref 33.7–35.3)
MCV RBC AUTO: 93.2 FL (ref 81.4–97.8)
MONOCYTES # BLD AUTO: 0.53 K/UL (ref 0–0.85)
MONOCYTES NFR BLD AUTO: 7.6 % (ref 0–13.4)
NEUTROPHILS # BLD AUTO: 4.12 K/UL (ref 1.82–7.42)
NEUTROPHILS NFR BLD: 59 % (ref 44–72)
NRBC # BLD AUTO: 0 K/UL
NRBC BLD-RTO: 0 /100 WBC
PLATELET # BLD AUTO: 205 K/UL (ref 164–446)
PMV BLD AUTO: 11.4 FL (ref 9–12.9)
POTASSIUM SERPL-SCNC: 4.5 MMOL/L (ref 3.6–5.5)
PROT SERPL-MCNC: 6.4 G/DL (ref 6–8.2)
RBC # BLD AUTO: 5.03 M/UL (ref 4.7–6.1)
SODIUM SERPL-SCNC: 136 MMOL/L (ref 135–145)
TIBC SERPL-MCNC: 358 UG/DL (ref 250–450)
TRIGL SERPL-MCNC: 122 MG/DL (ref 0–149)
WBC # BLD AUTO: 7 K/UL (ref 4.8–10.8)

## 2019-05-08 PROCEDURE — 83550 IRON BINDING TEST: CPT

## 2019-05-08 PROCEDURE — 99214 OFFICE O/P EST MOD 30 MIN: CPT | Performed by: NURSE PRACTITIONER

## 2019-05-08 PROCEDURE — 85025 COMPLETE CBC W/AUTO DIFF WBC: CPT

## 2019-05-08 PROCEDURE — 80053 COMPREHEN METABOLIC PANEL: CPT

## 2019-05-08 PROCEDURE — 80061 LIPID PANEL: CPT

## 2019-05-08 PROCEDURE — 83540 ASSAY OF IRON: CPT

## 2019-05-08 PROCEDURE — 36415 COLL VENOUS BLD VENIPUNCTURE: CPT

## 2019-05-08 PROCEDURE — 83036 HEMOGLOBIN GLYCOSYLATED A1C: CPT | Mod: GA

## 2019-05-08 RX ORDER — M-VIT,TX,IRON,MINS/CALC/FOLIC 27MG-0.4MG
1 TABLET ORAL DAILY
COMMUNITY
End: 2020-03-30

## 2019-05-08 NOTE — PROGRESS NOTES
Chief Complaint   Patient presents with   • Hyperlipidemia     request labs       HPI   Alfredo is a 68-year-old established male of Dr. Raya, here to follow-up on chronic issues.  He tells me that he is feeling pretty well, stress of his recent divorce is calming down.  He is back at work which he states keeps his psyche at a higher level.    #1-impaired fasting glucose: Last A1c was September 2018 at 5.9, May A1c was 6.0 but in 2017 A1c was down to 5.4.  He is not on any medication specifically for diabetes.  He is curious as to his A1c as he has lost about 30 pounds in the past year.  He does not check his blood sugars.  Denies any symptoms related to diabetes such as numbness or tingling in his feet, difficult to heal wounds or vision changes.    #2-hyperlipidemia: Chronic issue.  Compliant with atorvastatin daily.  He has lost 30 lbs in the past year and is wondering if he needs his current dosage of atorvastatin- he attributes weight loss to improved diet and returning to work. (he is having colonoscopy at end of this month b/c of positive cologuard).  Comorbid conditions of impaired fasting glucose, hypertension and gout.  Tells me he has never had a heart attack or stroke.  He is wondering if new pain in shoulders x the past year could be r/t work or statin therapy.  Smoker: no.  Alcohol a few d per week.     Denies chest pain or swelling in his legs.    #3- HTN: Chronic issue.  BP at home 120-130/70's on 20 mg lisinopril.  Again denies CP or leg swelling.  Denies chronic cough.     Taking centrum mtv.      Past medical, surgical, family, and social history is reviewed and updated in Epic chart by me today.   Medications and allergies reviewed and updated in Epic chart by me today.     ROS:   As documented in history of present illness above    Exam:  /70 (BP Location: Left arm, Patient Position: Sitting, BP Cuff Size: Adult)   Pulse 60   Temp 36.7 °C (98 °F)   Resp 14   Ht 1.829 m (6')   Wt  79.4 kg (175 lb)   SpO2 99%   Constitutional: Alert, no distress, plus 3 vital signs  Skin:  Warm, dry, no rashes invisible areas  Eye: Equal, round and reactive, conjunctiva clear  ENMT: Lips without lesions, good dentition, oropharynx clear    Neck: Trachea midline  Respiratory: Unlabored respiration, lungs clear to auscultation, no wheezes, no rhonchi  Cardiovascular: Normal rate and rhythm  Psych: Alert, pleasant, well-groomed, normal affect    Assessment / Plan / Medical Decision makin. Mixed hyperlipidemia  -Recommend updated blood work and a CT cardiac scoring to assess his need for such high-dose atorvastatin.  It would be his preference to reduce or come off of statin therapy if possible.  Encouraged him to follow a plant-based, lean protein diet.  I will follow-up with him with all results when they return.  - Lipid Profile; Future  - CBC WITH DIFFERENTIAL; Future  - Comp Metabolic Panel; Future  - IRON/TOTAL IRON BIND; Future  - CT-CARDIAC SCORING; Future    2. Essential hypertension  -Stable.  Continue same.  - Lipid Profile; Future  - CBC WITH DIFFERENTIAL; Future  - Comp Metabolic Panel; Future  - IRON/TOTAL IRON BIND; Future  - CT-CARDIAC SCORING; Future    3. Impaired fasting glucose  -Likely improved with weight loss.  I will follow-up with him when results return.  - HEMOGLOBIN A1C; Future  - IRON/TOTAL IRON BIND; Future    4. Weight loss  -Strongly encouraged him to keep his appointment for colonoscopy.  - IRON/TOTAL IRON BIND; Future    5. Positive colorectal cancer screening using Cologuard test  -Appointment for colonoscopy is scheduled.  - IRON/TOTAL IRON BIND; Future    6. Other specified personal risk factors, not elsewhere classified  -Recommend a CT cardiac scoring to assess his need for statin therapy.  - CT-CARDIAC SCORING; Future

## 2019-05-09 LAB
EST. AVERAGE GLUCOSE BLD GHB EST-MCNC: 114 MG/DL
HBA1C MFR BLD: 5.6 % (ref 0–5.6)

## 2019-05-15 DIAGNOSIS — E78.2 MIXED HYPERLIPIDEMIA: ICD-10-CM

## 2019-05-15 RX ORDER — ATORVASTATIN CALCIUM 40 MG/1
TABLET, FILM COATED ORAL
Qty: 90 TAB | Refills: 2
Start: 2019-05-15 | End: 2020-03-30 | Stop reason: SDUPTHER

## 2019-06-11 NOTE — TELEPHONE ENCOUNTER
Let us try to get him in with Dr. Rodriges on Thursday.  In the meantime he can take Tylenol for overall not feeling well, Imodium a few times a day for diarrhea.  If he is getting worse in the meantime he should go to urgent care.   yes

## 2019-07-26 ENCOUNTER — TELEPHONE (OUTPATIENT)
Dept: MEDICAL GROUP | Facility: LAB | Age: 69
End: 2019-07-26

## 2019-07-26 NOTE — TELEPHONE ENCOUNTER
Left vm for pt to give us a call back before appt next week, Radha Desir would like to know if he has had knee injections as well and if he has had an xray for his knee.  When pt calls back route to Radha Desir.

## 2019-07-30 ENCOUNTER — OFFICE VISIT (OUTPATIENT)
Dept: MEDICAL GROUP | Facility: LAB | Age: 69
End: 2019-07-30
Payer: MEDICARE

## 2019-07-30 VITALS
RESPIRATION RATE: 14 BRPM | TEMPERATURE: 98 F | SYSTOLIC BLOOD PRESSURE: 132 MMHG | WEIGHT: 176.59 LBS | HEIGHT: 72 IN | BODY MASS INDEX: 23.92 KG/M2 | DIASTOLIC BLOOD PRESSURE: 72 MMHG | OXYGEN SATURATION: 97 % | HEART RATE: 68 BPM

## 2019-07-30 DIAGNOSIS — M25.562 CHRONIC PAIN OF LEFT KNEE: ICD-10-CM

## 2019-07-30 DIAGNOSIS — R73.01 IMPAIRED FASTING GLUCOSE: ICD-10-CM

## 2019-07-30 DIAGNOSIS — G89.29 CHRONIC PAIN OF LEFT KNEE: ICD-10-CM

## 2019-07-30 DIAGNOSIS — E78.2 MIXED HYPERLIPIDEMIA: ICD-10-CM

## 2019-07-30 PROCEDURE — 20610 DRAIN/INJ JOINT/BURSA W/O US: CPT | Mod: RT | Performed by: NURSE PRACTITIONER

## 2019-07-30 PROCEDURE — 99214 OFFICE O/P EST MOD 30 MIN: CPT | Mod: 25 | Performed by: NURSE PRACTITIONER

## 2019-07-30 NOTE — PROGRESS NOTES
CC:The encounter diagnosis was Chronic pain of left knee.    HISTORY OF PRESENT ILLNESS: Prieto Holm is a 68 y.o. male established patient who presents today to discuss the following problems:      1. Chronic pain of left knee  This is chronic problem for patient.  He does have meniscal injury with history of arthroscopy on both knees however it is his left knee that bothers him most.  He reports that his symptoms have been aggravated over the last year as he has been working as a  even though he is retired.   His last joint injection was over one year ago.  He occasionally does do over-the-counter NSAIDs and feels that he has been using these more recently as his discomfort has increased.    2. Impaired fasting glucose  This is a chronic stable problem. Patient has had slightly elevated sugar levels in the past. He now has a hemoglobin A1c of 5.9%.  He has lost some weight over the last year because of his work schedule and reports that he does not eat as much as he should he denies neuropathy, polyuria, polydipsia     3.Mixed hyperlipidemia  Chronic problem.   Patient is on atorvastatin 40 mg daily.  We have reviewed his most latest lipid panel from 5/8/2019.  He denies side effects from medication including GI upset, myalgias.         Health Maintenance: Completed    Patient Active Problem List    Diagnosis Date Noted   • Positive occult stool blood test 11/01/2018   • Diarrhea 10/22/2018   • Marital problems 09/19/2018   • Adjustment disorder with depressed mood 05/25/2018   • Insomnia secondary to situational depression 05/25/2018   • Marital stress 05/25/2018   • Current moderate episode of major depressive disorder without prior episode (HCC) 05/23/2018   • Partner relationship problem 05/16/2018   • Dysthymia 05/16/2018   • Vascular ectasia of skin 01/11/2018   • Impaired fasting glucose 01/19/2017   • History of bladder cancer 10/18/2016   • Primary insomnia 10/18/2016   • Chronic  pain of left knee 10/18/2016   • Health care maintenance 10/18/2016   • Gout    • Hyperlipidemia    • Hypertension         Allergies:Patient has no known allergies.    Current Outpatient Prescriptions   Medication Sig Dispense Refill   • atorvastatin (LIPITOR) 40 MG Tab TAKE 1 TABLET EVERY EVENING 90 Tab 2   • therapeutic multivitamin-minerals (THERAGRAN-M) Tab Take 1 Tab by mouth every day.     • lisinopril (PRINIVIL) 20 MG Tab Take 1 Tab by mouth every day. 90 Tab 3   • atorvastatin (LIPITOR) 80 MG tablet Take 1 Tab by mouth every evening. (Patient not taking: Reported on 7/30/2019) 90 Tab 3   • Melatonin 10 MG Tab Take  by mouth.     • colchicine (COLCRYS) 0.6 MG Tab Take 2 tabs PO once then 1 tab PO 1 hour later prn gout attack 3 Tab 2   • indomethacin (INDOCIN) 25 MG Cap Take 1 Cap by mouth 3 times a day as needed (gout). 90 Cap 0     No current facility-administered medications for this visit.        Social History   Substance Use Topics   • Smoking status: Former Smoker     Packs/day: 1.00     Years: 35.00     Types: Cigarettes     Quit date: 10/18/2010   • Smokeless tobacco: Never Used   • Alcohol use Yes     Social History     Social History Narrative   • No narrative on file       Family History   Problem Relation Age of Onset   • Thyroid Mother    • Alzheimer's Disease Father    • Other Other         autism spectrum disorder       ROS:   No fevers or weight loss  No headaches or sore throat  No chest pain or shortness of breath  No bowel changes  No lower extremity edema  No suicidal ideation or panic attack        EXAM:   /72 (BP Location: Left arm, Patient Position: Sitting)   Pulse 68   Temp 36.7 °C (98 °F)   Resp 14   Ht 1.829 m (6')   Wt 80.1 kg (176 lb 9.4 oz)   SpO2 97%  Body mass index is 23.95 kg/m².    Constitutional: Well-developed and well-nourished. Not diaphoretic. No distress.   Skin: Skin is warm and dry. No rash noted.  Head: Atraumatic without lesions.  Neck: Supple, trachea  midline. No thyromegaly present. No cervical or supraclavicular lymphadenopathy.  Cardiovascular: Regular rate and rhythm.   Chest: Effort normal. Clear to auscultation throughout. No adventitious sounds.   Abdomen: Soft, non tender, and without distention. Active bowel sounds in all four quadrants. No rebound, guarding, masses or hepatosplenomegaly.  Extremities: No cyanosis, clubbing, erythema, nor edema.   Neurological: Alert and oriented x 3. Sensation intact.   Psychiatric:  Behavior, mood, and affect are appropriate.       ASSESSMENT/PLAN:    1. Chronic pain of left knee  Chronic uncontrolled.  Last joint injection more than 1 year ago.  - Consent for Amb Surgery/Procedure    DIAGNOSIS:  Chronic knee pain with arthritis     INDICATIONS:  Pain     Informed consent was obtained. We discussed the risks and benefits including bleeding, infection, fat necrosis, pain, no improvement current pain     The joint was prepped with chlorhexadine and anesthetized with a topical spray. A 22 guage needle was inserted into the superior infrapatellar aspect of the joint from a lateral approach.  1 cc of 2% xylocaine, 2 cc of bupivicaine, and 1 cc of kenalog 40 was then injected into the joint. The area cleansed and dressed with a bandaid. Patient tolerated procedure well    2. Impaired fasting glucose  Chronic well-controlled.  Improved with weight loss and dietary interventions.    3. Mixed hyperlipidemia  Chronic well-controlled with atorvastatin 40 mg daily.  No medication changes      Return in about 6 months (around 1/30/2020) for Routine Follow up.       Please note that this dictation was created using voice recognition software. I have made every reasonable attempt to correct obvious errors, but I expect that there are errors of grammar and possibly content that I did not discover before finalizing the note.

## 2019-08-06 RX ORDER — TRIAMCINOLONE ACETONIDE 40 MG/ML
40 INJECTION, SUSPENSION INTRA-ARTICULAR; INTRAMUSCULAR ONCE
Status: DISCONTINUED | OUTPATIENT
Start: 2019-08-06 | End: 2019-08-07

## 2019-08-07 ENCOUNTER — APPOINTMENT (OUTPATIENT)
Dept: RADIOLOGY | Facility: MEDICAL CENTER | Age: 69
End: 2019-08-07
Attending: EMERGENCY MEDICINE
Payer: COMMERCIAL

## 2019-08-07 ENCOUNTER — HOSPITAL ENCOUNTER (OUTPATIENT)
Facility: MEDICAL CENTER | Age: 69
End: 2019-08-07
Attending: EMERGENCY MEDICINE | Admitting: ORTHOPAEDIC SURGERY
Payer: COMMERCIAL

## 2019-08-07 ENCOUNTER — ANESTHESIA (OUTPATIENT)
Dept: SURGERY | Facility: MEDICAL CENTER | Age: 69
End: 2019-08-07
Payer: COMMERCIAL

## 2019-08-07 ENCOUNTER — APPOINTMENT (OUTPATIENT)
Dept: RADIOLOGY | Facility: MEDICAL CENTER | Age: 69
End: 2019-08-07
Attending: ORTHOPAEDIC SURGERY
Payer: COMMERCIAL

## 2019-08-07 ENCOUNTER — HOSPITAL ENCOUNTER (OUTPATIENT)
Dept: RADIOLOGY | Facility: MEDICAL CENTER | Age: 69
End: 2019-08-07

## 2019-08-07 ENCOUNTER — APPOINTMENT (OUTPATIENT)
Dept: RADIOLOGY | Facility: MEDICAL CENTER | Age: 69
End: 2019-08-07
Attending: PHYSICIAN ASSISTANT
Payer: COMMERCIAL

## 2019-08-07 ENCOUNTER — ANESTHESIA EVENT (OUTPATIENT)
Dept: SURGERY | Facility: MEDICAL CENTER | Age: 69
End: 2019-08-07
Payer: COMMERCIAL

## 2019-08-07 VITALS
HEIGHT: 72 IN | DIASTOLIC BLOOD PRESSURE: 81 MMHG | RESPIRATION RATE: 16 BRPM | TEMPERATURE: 98 F | BODY MASS INDEX: 23.7 KG/M2 | WEIGHT: 175 LBS | HEART RATE: 72 BPM | OXYGEN SATURATION: 97 % | SYSTOLIC BLOOD PRESSURE: 175 MMHG

## 2019-08-07 DIAGNOSIS — S82.831B TYPE I OR II OPEN FRACTURE OF DISTAL END OF RIGHT FIBULA, UNSPECIFIED FRACTURE MORPHOLOGY, INITIAL ENCOUNTER: ICD-10-CM

## 2019-08-07 DIAGNOSIS — S82.301B TYPE I OR II OPEN FRACTURE OF DISTAL END OF RIGHT TIBIA, UNSPECIFIED FRACTURE MORPHOLOGY, INITIAL ENCOUNTER: ICD-10-CM

## 2019-08-07 PROBLEM — C67.9 BLADDER CANCER (HCC): Chronic | Status: ACTIVE | Noted: 2019-08-07

## 2019-08-07 LAB
ALBUMIN SERPL BCP-MCNC: 4.3 G/DL (ref 3.2–4.9)
ALBUMIN/GLOB SERPL: 1.8 G/DL
ALP SERPL-CCNC: 67 U/L (ref 30–99)
ALT SERPL-CCNC: 12 U/L (ref 2–50)
ANION GAP SERPL CALC-SCNC: 8 MMOL/L (ref 0–11.9)
AST SERPL-CCNC: 16 U/L (ref 12–45)
BASOPHILS # BLD AUTO: 0.3 % (ref 0–1.8)
BASOPHILS # BLD: 0.03 K/UL (ref 0–0.12)
BILIRUB SERPL-MCNC: 0.9 MG/DL (ref 0.1–1.5)
BUN SERPL-MCNC: 14 MG/DL (ref 8–22)
CALCIUM SERPL-MCNC: 10.3 MG/DL (ref 8.5–10.5)
CHLORIDE SERPL-SCNC: 106 MMOL/L (ref 96–112)
CO2 SERPL-SCNC: 25 MMOL/L (ref 20–33)
CREAT SERPL-MCNC: 0.89 MG/DL (ref 0.5–1.4)
EKG IMPRESSION: NORMAL
EOSINOPHIL # BLD AUTO: 0.04 K/UL (ref 0–0.51)
EOSINOPHIL NFR BLD: 0.4 % (ref 0–6.9)
ERYTHROCYTE [DISTWIDTH] IN BLOOD BY AUTOMATED COUNT: 47.8 FL (ref 35.9–50)
GLOBULIN SER CALC-MCNC: 2.4 G/DL (ref 1.9–3.5)
GLUCOSE SERPL-MCNC: 106 MG/DL (ref 65–99)
HCT VFR BLD AUTO: 42.5 % (ref 42–52)
HGB BLD-MCNC: 14.3 G/DL (ref 14–18)
IMM GRANULOCYTES # BLD AUTO: 0.05 K/UL (ref 0–0.11)
IMM GRANULOCYTES NFR BLD AUTO: 0.5 % (ref 0–0.9)
LYMPHOCYTES # BLD AUTO: 1.63 K/UL (ref 1–4.8)
LYMPHOCYTES NFR BLD: 16.4 % (ref 22–41)
MCH RBC QN AUTO: 31.8 PG (ref 27–33)
MCHC RBC AUTO-ENTMCNC: 33.6 G/DL (ref 33.7–35.3)
MCV RBC AUTO: 94.4 FL (ref 81.4–97.8)
MONOCYTES # BLD AUTO: 0.81 K/UL (ref 0–0.85)
MONOCYTES NFR BLD AUTO: 8.2 % (ref 0–13.4)
NEUTROPHILS # BLD AUTO: 7.35 K/UL (ref 1.82–7.42)
NEUTROPHILS NFR BLD: 74.2 % (ref 44–72)
NRBC # BLD AUTO: 0 K/UL
NRBC BLD-RTO: 0 /100 WBC
PLATELET # BLD AUTO: 207 K/UL (ref 164–446)
PMV BLD AUTO: 11 FL (ref 9–12.9)
POTASSIUM SERPL-SCNC: 4.1 MMOL/L (ref 3.6–5.5)
PROT SERPL-MCNC: 6.7 G/DL (ref 6–8.2)
RBC # BLD AUTO: 4.5 M/UL (ref 4.7–6.1)
SODIUM SERPL-SCNC: 139 MMOL/L (ref 135–145)
WBC # BLD AUTO: 9.9 K/UL (ref 4.8–10.8)

## 2019-08-07 PROCEDURE — 302875 HCHG BANDAGE ACE 4 OR 6""

## 2019-08-07 PROCEDURE — 96376 TX/PRO/DX INJ SAME DRUG ADON: CPT

## 2019-08-07 PROCEDURE — 700111 HCHG RX REV CODE 636 W/ 250 OVERRIDE (IP): Performed by: EMERGENCY MEDICINE

## 2019-08-07 PROCEDURE — 73590 X-RAY EXAM OF LOWER LEG: CPT | Mod: RT

## 2019-08-07 PROCEDURE — 73600 X-RAY EXAM OF ANKLE: CPT | Mod: RT

## 2019-08-07 PROCEDURE — 71045 X-RAY EXAM CHEST 1 VIEW: CPT

## 2019-08-07 PROCEDURE — 700111 HCHG RX REV CODE 636 W/ 250 OVERRIDE (IP)

## 2019-08-07 PROCEDURE — 700111 HCHG RX REV CODE 636 W/ 250 OVERRIDE (IP): Performed by: ANESTHESIOLOGY

## 2019-08-07 PROCEDURE — 27840 TREAT ANKLE DISLOCATION: CPT

## 2019-08-07 PROCEDURE — 160028 HCHG SURGERY MINUTES - 1ST 30 MINS LEVEL 3: Performed by: ORTHOPAEDIC SURGERY

## 2019-08-07 PROCEDURE — 160039 HCHG SURGERY MINUTES - EA ADDL 1 MIN LEVEL 3: Performed by: ORTHOPAEDIC SURGERY

## 2019-08-07 PROCEDURE — 700102 HCHG RX REV CODE 250 W/ 637 OVERRIDE(OP): Performed by: ANESTHESIOLOGY

## 2019-08-07 PROCEDURE — 80053 COMPREHEN METABOLIC PANEL: CPT

## 2019-08-07 PROCEDURE — 73700 CT LOWER EXTREMITY W/O DYE: CPT | Mod: RT

## 2019-08-07 PROCEDURE — A9270 NON-COVERED ITEM OR SERVICE: HCPCS | Performed by: ANESTHESIOLOGY

## 2019-08-07 PROCEDURE — G0378 HOSPITAL OBSERVATION PER HR: HCPCS

## 2019-08-07 PROCEDURE — 700101 HCHG RX REV CODE 250: Performed by: ORTHOPAEDIC SURGERY

## 2019-08-07 PROCEDURE — 700105 HCHG RX REV CODE 258: Performed by: EMERGENCY MEDICINE

## 2019-08-07 PROCEDURE — 700101 HCHG RX REV CODE 250: Performed by: ANESTHESIOLOGY

## 2019-08-07 PROCEDURE — 160009 HCHG ANES TIME/MIN: Performed by: ORTHOPAEDIC SURGERY

## 2019-08-07 PROCEDURE — 110371 HCHG SHELL REV 272: Performed by: ORTHOPAEDIC SURGERY

## 2019-08-07 PROCEDURE — 96375 TX/PRO/DX INJ NEW DRUG ADDON: CPT

## 2019-08-07 PROCEDURE — 160048 HCHG OR STATISTICAL LEVEL 1-5: Performed by: ORTHOPAEDIC SURGERY

## 2019-08-07 PROCEDURE — 93005 ELECTROCARDIOGRAM TRACING: CPT | Performed by: EMERGENCY MEDICINE

## 2019-08-07 PROCEDURE — A6402 STERILE GAUZE <= 16 SQ IN: HCPCS | Performed by: ORTHOPAEDIC SURGERY

## 2019-08-07 PROCEDURE — 85025 COMPLETE CBC W/AUTO DIFF WBC: CPT

## 2019-08-07 PROCEDURE — 500881 HCHG PACK, EXTREMITY: Performed by: ORTHOPAEDIC SURGERY

## 2019-08-07 PROCEDURE — 160002 HCHG RECOVERY MINUTES (STAT): Performed by: ORTHOPAEDIC SURGERY

## 2019-08-07 PROCEDURE — 501838 HCHG SUTURE GENERAL: Performed by: ORTHOPAEDIC SURGERY

## 2019-08-07 PROCEDURE — C1713 ANCHOR/SCREW BN/BN,TIS/BN: HCPCS | Performed by: ORTHOPAEDIC SURGERY

## 2019-08-07 PROCEDURE — A6222 GAUZE <=16 IN NO W/SAL W/O B: HCPCS | Performed by: ORTHOPAEDIC SURGERY

## 2019-08-07 PROCEDURE — 160036 HCHG PACU - EA ADDL 30 MINS PHASE I: Performed by: ORTHOPAEDIC SURGERY

## 2019-08-07 PROCEDURE — 160035 HCHG PACU - 1ST 60 MINS PHASE I: Performed by: ORTHOPAEDIC SURGERY

## 2019-08-07 PROCEDURE — 96365 THER/PROPH/DIAG IV INF INIT: CPT

## 2019-08-07 PROCEDURE — 99291 CRITICAL CARE FIRST HOUR: CPT

## 2019-08-07 DEVICE — PIN HALF SELF TAPPING 5MM X 55MM 200MM LENGTH (2TX6=12): Type: IMPLANTABLE DEVICE | Site: ANKLE | Status: FUNCTIONAL

## 2019-08-07 RX ORDER — OXYCODONE HCL 5 MG/5 ML
10 SOLUTION, ORAL ORAL
Status: COMPLETED | OUTPATIENT
Start: 2019-08-07 | End: 2019-08-07

## 2019-08-07 RX ORDER — DEXAMETHASONE SODIUM PHOSPHATE 4 MG/ML
INJECTION, SOLUTION INTRA-ARTICULAR; INTRALESIONAL; INTRAMUSCULAR; INTRAVENOUS; SOFT TISSUE PRN
Status: DISCONTINUED | OUTPATIENT
Start: 2019-08-07 | End: 2019-08-07 | Stop reason: SURG

## 2019-08-07 RX ORDER — IBUPROFEN 200 MG
400 TABLET ORAL EVERY 6 HOURS PRN
COMMUNITY
End: 2020-03-30

## 2019-08-07 RX ORDER — DEXAMETHASONE SODIUM PHOSPHATE 4 MG/ML
4 INJECTION, SOLUTION INTRA-ARTICULAR; INTRALESIONAL; INTRAMUSCULAR; INTRAVENOUS; SOFT TISSUE
Status: CANCELLED | OUTPATIENT
Start: 2019-08-07

## 2019-08-07 RX ORDER — DIPHENHYDRAMINE HYDROCHLORIDE 50 MG/ML
25 INJECTION INTRAMUSCULAR; INTRAVENOUS EVERY 6 HOURS PRN
Status: CANCELLED | OUTPATIENT
Start: 2019-08-07

## 2019-08-07 RX ORDER — AMOXICILLIN 250 MG
1 CAPSULE ORAL NIGHTLY
Status: CANCELLED | OUTPATIENT
Start: 2019-08-07

## 2019-08-07 RX ORDER — MORPHINE SULFATE 4 MG/ML
4 INJECTION, SOLUTION INTRAMUSCULAR; INTRAVENOUS ONCE
Status: COMPLETED | OUTPATIENT
Start: 2019-08-07 | End: 2019-08-07

## 2019-08-07 RX ORDER — ONDANSETRON 2 MG/ML
4 INJECTION INTRAMUSCULAR; INTRAVENOUS
Status: DISCONTINUED | OUTPATIENT
Start: 2019-08-07 | End: 2019-08-08 | Stop reason: HOSPADM

## 2019-08-07 RX ORDER — HYDROMORPHONE HYDROCHLORIDE 1 MG/ML
0.4 INJECTION, SOLUTION INTRAMUSCULAR; INTRAVENOUS; SUBCUTANEOUS
Status: DISCONTINUED | OUTPATIENT
Start: 2019-08-07 | End: 2019-08-07 | Stop reason: HOSPADM

## 2019-08-07 RX ORDER — MEPERIDINE HYDROCHLORIDE 25 MG/ML
6.25 INJECTION INTRAMUSCULAR; INTRAVENOUS; SUBCUTANEOUS
Status: DISCONTINUED | OUTPATIENT
Start: 2019-08-07 | End: 2019-08-08 | Stop reason: HOSPADM

## 2019-08-07 RX ORDER — OXYCODONE HYDROCHLORIDE 5 MG/1
5 TABLET ORAL
Status: CANCELLED | OUTPATIENT
Start: 2019-08-07

## 2019-08-07 RX ORDER — ONDANSETRON 2 MG/ML
4 INJECTION INTRAMUSCULAR; INTRAVENOUS
Status: DISCONTINUED | OUTPATIENT
Start: 2019-08-07 | End: 2019-08-07 | Stop reason: HOSPADM

## 2019-08-07 RX ORDER — POLYETHYLENE GLYCOL 3350 17 G/17G
1 POWDER, FOR SOLUTION ORAL 2 TIMES DAILY PRN
Status: CANCELLED | OUTPATIENT
Start: 2019-08-07

## 2019-08-07 RX ORDER — IPRATROPIUM BROMIDE AND ALBUTEROL SULFATE 2.5; .5 MG/3ML; MG/3ML
3 SOLUTION RESPIRATORY (INHALATION)
Status: DISCONTINUED | OUTPATIENT
Start: 2019-08-07 | End: 2019-08-07 | Stop reason: HOSPADM

## 2019-08-07 RX ORDER — AMOXICILLIN 250 MG
1 CAPSULE ORAL
Status: CANCELLED | OUTPATIENT
Start: 2019-08-07

## 2019-08-07 RX ORDER — HYDROMORPHONE HYDROCHLORIDE 1 MG/ML
0.2 INJECTION, SOLUTION INTRAMUSCULAR; INTRAVENOUS; SUBCUTANEOUS
Status: DISCONTINUED | OUTPATIENT
Start: 2019-08-07 | End: 2019-08-07 | Stop reason: HOSPADM

## 2019-08-07 RX ORDER — ONDANSETRON 2 MG/ML
4 INJECTION INTRAMUSCULAR; INTRAVENOUS ONCE
Status: COMPLETED | OUTPATIENT
Start: 2019-08-07 | End: 2019-08-07

## 2019-08-07 RX ORDER — SODIUM CHLORIDE, SODIUM LACTATE, POTASSIUM CHLORIDE, CALCIUM CHLORIDE 600; 310; 30; 20 MG/100ML; MG/100ML; MG/100ML; MG/100ML
1000 INJECTION, SOLUTION INTRAVENOUS CONTINUOUS
Status: DISCONTINUED | OUTPATIENT
Start: 2019-08-07 | End: 2019-08-08 | Stop reason: HOSPADM

## 2019-08-07 RX ORDER — HALOPERIDOL 5 MG/ML
1 INJECTION INTRAMUSCULAR
Status: DISCONTINUED | OUTPATIENT
Start: 2019-08-07 | End: 2019-08-08 | Stop reason: HOSPADM

## 2019-08-07 RX ORDER — CEFAZOLIN SODIUM 2 G/100ML
2 INJECTION, SOLUTION INTRAVENOUS EVERY 8 HOURS
Status: CANCELLED | OUTPATIENT
Start: 2019-08-07 | End: 2019-08-08

## 2019-08-07 RX ORDER — MORPHINE SULFATE 10 MG/ML
5 INJECTION, SOLUTION INTRAMUSCULAR; INTRAVENOUS
Status: DISCONTINUED | OUTPATIENT
Start: 2019-08-07 | End: 2019-08-08 | Stop reason: HOSPADM

## 2019-08-07 RX ORDER — ONDANSETRON 2 MG/ML
4 INJECTION INTRAMUSCULAR; INTRAVENOUS EVERY 4 HOURS PRN
Status: CANCELLED | OUTPATIENT
Start: 2019-08-07

## 2019-08-07 RX ORDER — MEPERIDINE HYDROCHLORIDE 25 MG/ML
6.25 INJECTION INTRAMUSCULAR; INTRAVENOUS; SUBCUTANEOUS
Status: DISCONTINUED | OUTPATIENT
Start: 2019-08-07 | End: 2019-08-07 | Stop reason: HOSPADM

## 2019-08-07 RX ORDER — CEFAZOLIN SODIUM 2 G/100ML
2 INJECTION, SOLUTION INTRAVENOUS ONCE
Status: COMPLETED | OUTPATIENT
Start: 2019-08-07 | End: 2019-08-07

## 2019-08-07 RX ORDER — MAGNESIUM HYDROXIDE 1200 MG/15ML
LIQUID ORAL
Status: COMPLETED | OUTPATIENT
Start: 2019-08-07 | End: 2019-08-07

## 2019-08-07 RX ORDER — HYDROMORPHONE HYDROCHLORIDE 1 MG/ML
0.1 INJECTION, SOLUTION INTRAMUSCULAR; INTRAVENOUS; SUBCUTANEOUS
Status: DISCONTINUED | OUTPATIENT
Start: 2019-08-07 | End: 2019-08-07 | Stop reason: HOSPADM

## 2019-08-07 RX ORDER — SODIUM CHLORIDE, SODIUM LACTATE, POTASSIUM CHLORIDE, CALCIUM CHLORIDE 600; 310; 30; 20 MG/100ML; MG/100ML; MG/100ML; MG/100ML
INJECTION, SOLUTION INTRAVENOUS CONTINUOUS
Status: DISCONTINUED | OUTPATIENT
Start: 2019-08-07 | End: 2019-08-08 | Stop reason: HOSPADM

## 2019-08-07 RX ORDER — HALOPERIDOL 5 MG/ML
1 INJECTION INTRAMUSCULAR
Status: DISCONTINUED | OUTPATIENT
Start: 2019-08-07 | End: 2019-08-07 | Stop reason: HOSPADM

## 2019-08-07 RX ORDER — HYDROMORPHONE HYDROCHLORIDE 1 MG/ML
0.5 INJECTION, SOLUTION INTRAMUSCULAR; INTRAVENOUS; SUBCUTANEOUS
Status: CANCELLED | OUTPATIENT
Start: 2019-08-07

## 2019-08-07 RX ORDER — OXYCODONE HCL 5 MG/5 ML
5 SOLUTION, ORAL ORAL
Status: DISCONTINUED | OUTPATIENT
Start: 2019-08-07 | End: 2019-08-07 | Stop reason: HOSPADM

## 2019-08-07 RX ORDER — DIPHENHYDRAMINE HYDROCHLORIDE 50 MG/ML
12.5 INJECTION INTRAMUSCULAR; INTRAVENOUS
Status: DISCONTINUED | OUTPATIENT
Start: 2019-08-07 | End: 2019-08-08 | Stop reason: HOSPADM

## 2019-08-07 RX ORDER — LIDOCAINE HYDROCHLORIDE 20 MG/ML
INJECTION, SOLUTION EPIDURAL; INFILTRATION; INTRACAUDAL; PERINEURAL PRN
Status: DISCONTINUED | OUTPATIENT
Start: 2019-08-07 | End: 2019-08-07 | Stop reason: SURG

## 2019-08-07 RX ORDER — SCOLOPAMINE TRANSDERMAL SYSTEM 1 MG/1
1 PATCH, EXTENDED RELEASE TRANSDERMAL
Status: CANCELLED | OUTPATIENT
Start: 2019-08-07

## 2019-08-07 RX ORDER — MIDAZOLAM HYDROCHLORIDE 1 MG/ML
INJECTION INTRAMUSCULAR; INTRAVENOUS PRN
Status: DISCONTINUED | OUTPATIENT
Start: 2019-08-07 | End: 2019-08-07 | Stop reason: SURG

## 2019-08-07 RX ORDER — BISACODYL 10 MG
10 SUPPOSITORY, RECTAL RECTAL
Status: CANCELLED | OUTPATIENT
Start: 2019-08-07

## 2019-08-07 RX ORDER — SULFAMETHOXAZOLE AND TRIMETHOPRIM 800; 160 MG/1; MG/1
1 TABLET ORAL 2 TIMES DAILY
Qty: 20 TAB | Refills: 0 | Status: SHIPPED | OUTPATIENT
Start: 2019-08-07 | End: 2020-03-30

## 2019-08-07 RX ORDER — OXYCODONE HYDROCHLORIDE 5 MG/1
10 TABLET ORAL
Status: CANCELLED | OUTPATIENT
Start: 2019-08-07

## 2019-08-07 RX ORDER — DOCUSATE SODIUM 100 MG/1
100 CAPSULE, LIQUID FILLED ORAL 2 TIMES DAILY
Status: CANCELLED | OUTPATIENT
Start: 2019-08-07

## 2019-08-07 RX ORDER — OXYCODONE HCL 5 MG/5 ML
10 SOLUTION, ORAL ORAL
Status: DISCONTINUED | OUTPATIENT
Start: 2019-08-07 | End: 2019-08-07 | Stop reason: HOSPADM

## 2019-08-07 RX ORDER — IPRATROPIUM BROMIDE AND ALBUTEROL SULFATE 2.5; .5 MG/3ML; MG/3ML
3 SOLUTION RESPIRATORY (INHALATION)
Status: DISCONTINUED | OUTPATIENT
Start: 2019-08-07 | End: 2019-08-08 | Stop reason: HOSPADM

## 2019-08-07 RX ORDER — ONDANSETRON 2 MG/ML
4 INJECTION INTRAMUSCULAR; INTRAVENOUS
Status: COMPLETED | OUTPATIENT
Start: 2019-08-07 | End: 2019-08-07

## 2019-08-07 RX ORDER — HALOPERIDOL 5 MG/ML
1 INJECTION INTRAMUSCULAR EVERY 6 HOURS PRN
Status: CANCELLED | OUTPATIENT
Start: 2019-08-07

## 2019-08-07 RX ORDER — OXYCODONE HCL 5 MG/5 ML
5 SOLUTION, ORAL ORAL
Status: COMPLETED | OUTPATIENT
Start: 2019-08-07 | End: 2019-08-07

## 2019-08-07 RX ORDER — ACETAMINOPHEN 500 MG
1000 TABLET ORAL EVERY 6 HOURS
Status: CANCELLED | OUTPATIENT
Start: 2019-08-08 | End: 2019-08-12

## 2019-08-07 RX ORDER — OXYCODONE HYDROCHLORIDE 5 MG/1
5 TABLET ORAL EVERY 4 HOURS PRN
Qty: 20 TAB | Refills: 0 | Status: SHIPPED | OUTPATIENT
Start: 2019-08-07 | End: 2019-08-12

## 2019-08-07 RX ORDER — CEFAZOLIN SODIUM 1 G/3ML
INJECTION, POWDER, FOR SOLUTION INTRAMUSCULAR; INTRAVENOUS PRN
Status: DISCONTINUED | OUTPATIENT
Start: 2019-08-07 | End: 2019-08-07 | Stop reason: SURG

## 2019-08-07 RX ORDER — ENEMA 19; 7 G/133ML; G/133ML
1 ENEMA RECTAL
Status: CANCELLED | OUTPATIENT
Start: 2019-08-07

## 2019-08-07 RX ORDER — KETOROLAC TROMETHAMINE 30 MG/ML
15 INJECTION, SOLUTION INTRAMUSCULAR; INTRAVENOUS EVERY 6 HOURS
Status: CANCELLED | OUTPATIENT
Start: 2019-08-07 | End: 2019-08-10

## 2019-08-07 RX ADMIN — PROPOFOL 60 MG: 10 INJECTION, EMULSION INTRAVENOUS at 14:39

## 2019-08-07 RX ADMIN — ONDANSETRON 4 MG: 2 INJECTION INTRAMUSCULAR; INTRAVENOUS at 15:03

## 2019-08-07 RX ADMIN — ONDANSETRON 4 MG: 2 INJECTION INTRAMUSCULAR; INTRAVENOUS at 20:11

## 2019-08-07 RX ADMIN — MORPHINE SULFATE 4 MG: 4 INJECTION INTRAVENOUS at 15:03

## 2019-08-07 RX ADMIN — FENTANYL CITRATE 50 MCG: 50 INJECTION, SOLUTION INTRAMUSCULAR; INTRAVENOUS at 20:09

## 2019-08-07 RX ADMIN — FENTANYL CITRATE 50 MCG: 50 INJECTION, SOLUTION INTRAMUSCULAR; INTRAVENOUS at 22:19

## 2019-08-07 RX ADMIN — FENTANYL CITRATE 50 MCG: 50 INJECTION, SOLUTION INTRAMUSCULAR; INTRAVENOUS at 21:24

## 2019-08-07 RX ADMIN — PROPOFOL 180 MG: 10 INJECTION, EMULSION INTRAVENOUS at 20:05

## 2019-08-07 RX ADMIN — FENTANYL CITRATE 50 MCG: 50 INJECTION, SOLUTION INTRAMUSCULAR; INTRAVENOUS at 20:00

## 2019-08-07 RX ADMIN — OXYCODONE HYDROCHLORIDE 10 MG: 5 SOLUTION ORAL at 20:56

## 2019-08-07 RX ADMIN — SODIUM CHLORIDE, POTASSIUM CHLORIDE, SODIUM LACTATE AND CALCIUM CHLORIDE: 600; 310; 30; 20 INJECTION, SOLUTION INTRAVENOUS at 20:00

## 2019-08-07 RX ADMIN — MORPHINE SULFATE 4 MG: 4 INJECTION INTRAVENOUS at 15:32

## 2019-08-07 RX ADMIN — MIDAZOLAM 2 MG: 1 INJECTION INTRAMUSCULAR; INTRAVENOUS at 20:01

## 2019-08-07 RX ADMIN — FENTANYL CITRATE 25 MCG: 50 INJECTION, SOLUTION INTRAMUSCULAR; INTRAVENOUS at 20:17

## 2019-08-07 RX ADMIN — LIDOCAINE HYDROCHLORIDE 5 ML: 20 INJECTION, SOLUTION EPIDURAL; INFILTRATION; INTRACAUDAL at 20:05

## 2019-08-07 RX ADMIN — FENTANYL CITRATE 50 MCG: 50 INJECTION, SOLUTION INTRAMUSCULAR; INTRAVENOUS at 21:02

## 2019-08-07 RX ADMIN — FENTANYL CITRATE 50 MCG: 50 INJECTION, SOLUTION INTRAMUSCULAR; INTRAVENOUS at 21:00

## 2019-08-07 RX ADMIN — MORPHINE SULFATE 4 MG: 4 INJECTION INTRAVENOUS at 18:09

## 2019-08-07 RX ADMIN — CEFAZOLIN SODIUM 2 G: 2 INJECTION, SOLUTION INTRAVENOUS at 15:03

## 2019-08-07 RX ADMIN — DEXAMETHASONE SODIUM PHOSPHATE 4 MG: 4 INJECTION, SOLUTION INTRA-ARTICULAR; INTRALESIONAL; INTRAMUSCULAR; INTRAVENOUS; SOFT TISSUE at 20:10

## 2019-08-07 RX ADMIN — PROPOFOL 60 MG: 10 INJECTION, EMULSION INTRAVENOUS at 14:38

## 2019-08-07 RX ADMIN — CEFAZOLIN 2 G: 330 INJECTION, POWDER, FOR SOLUTION INTRAMUSCULAR; INTRAVENOUS at 20:08

## 2019-08-07 RX ADMIN — PROPOFOL 120 MG: 10 INJECTION, EMULSION INTRAVENOUS at 14:36

## 2019-08-07 RX ADMIN — FENTANYL CITRATE 25 MCG: 50 INJECTION, SOLUTION INTRAMUSCULAR; INTRAVENOUS at 20:20

## 2019-08-07 RX ADMIN — FENTANYL CITRATE 50 MCG: 50 INJECTION, SOLUTION INTRAMUSCULAR; INTRAVENOUS at 21:21

## 2019-08-07 ASSESSMENT — PAIN SCALES - GENERAL: PAIN_LEVEL: 3

## 2019-08-07 NOTE — ED PROVIDER NOTES
ED Provider Note    Scribed for Raudel Grant M.D. by Alejandra Bruno. 8/7/2019  2:21 PM    Primary care provider: Radha Fleming M.D.  Means of arrival: Walk-in  History obtained from: Patient  History limited by: None    CHIEF COMPLAINT  Chief Complaint   Patient presents with   • Fall Less than 10 Feet     fell approx 6ft from a ladder, landed on RT ankle. +deformity.   • Ankle Pain   • Sent from Urgent Care     xray at  revealed fx.        HPI  Prieto Holm is a 68 y.o. male who presents to the Emergency Department for evaluation of acute, constant, non-radiating right ankle pain secondary to a six foot fall onset at 9:30 AM. The patient reports that last week he dropped a patio door on his left toe last week and while standing on a six foot ladder earlier today, he was leaning on his right side which caused him to fall from the ladder and land onto his right side. His right ankle struck a brick after he landed from the fall.  No exacerbating or alleviating factors were reported for the patient's right ankle pain. Associated open laceration on the right ankle, bleeding from the laceration, redness and swelling to the right ankle. Negative loss of consciousness. The patient states that he visited Urgent Care for his right ankle pain, however the staff at Urgent Care sent him to the ED for further evaluation of his condition.  He notes that his fluid intake was water at 9 AM today. He denies ingesting any solid foods today. The patient reports taking Advil before his fall but denies taking any blood thinners. Past medical history includes bladder cancer for which he received chemotherapy for.  Denies smoking cigarettes. The patient takes no daily medications, and has no allergies to medication. Vaccinations are up to date.       REVIEW OF SYSTEMS  Pertinent negatives include no loss of consciousness. As above, all other systems reviewed and are negative.   See HPI for further details.     PAST  MEDICAL HISTORY   has a past medical history of Bladder cancer (HCC), Cancer (HCC), Depression, Gout, Hyperlipidemia, Hypertension, and Lip injury.    SURGICAL HISTORY   has a past surgical history that includes other orthopedic surgery (Left); meniscus repair; and bladder biopsy with cystoscopy.    SOCIAL HISTORY  Social History     Tobacco Use   • Smoking status: Former Smoker     Packs/day: 1.00     Years: 35.00     Pack years: 35.00     Types: Cigarettes     Last attempt to quit: 10/18/2010     Years since quittin.8   • Smokeless tobacco: Never Used   Substance Use Topics   • Alcohol use: Yes   • Drug use: No      Social History     Substance and Sexual Activity   Drug Use No       FAMILY HISTORY  Family History   Problem Relation Age of Onset   • Thyroid Mother    • Alzheimer's Disease Father    • Other Other         autism spectrum disorder       CURRENT MEDICATIONS  Home Medications     Reviewed by Tanmay Deluca (Pharmacy Tech) on 19 at 1543  Med List Status: Complete   Medication Last Dose Status   atorvastatin (LIPITOR) 40 MG Tab 2019 Active   ibuprofen (MOTRIN) 200 MG Tab 2019 Active   lisinopril (PRINIVIL) 20 MG Tab 2019 Active   Melatonin 10 MG Tab 2019 Active   therapeutic multivitamin-minerals (THERAGRAN-M) Tab 2019 Active                ALLERGIES  No Known Allergies    PHYSICAL EXAM  VITAL SIGNS: BP (!) 165/104   Pulse 77   Temp 37 °C (98.6 °F) (Temporal)   Resp 15   Ht 1.829 m (6')   Wt 79.4 kg (175 lb)   SpO2 97%   BMI 23.73 kg/m²   Constitutional: Well developed, Well nourished, No acute distress, Non-toxic appearance.   HENT: Normocephalic, Atraumatic, Bilateral external ears normal, Oropharynx is clear mucous membranes are moist. No oral exudates or nasal discharge.   Eyes: Pupils are equal round and reactive, EOMI, Conjunctiva normal, No discharge.   Neck: Normal range of motion, No tenderness, Supple, No stridor. No meningismus.  Lymphatic: No  lymphadenopathy noted.   Cardiovascular: Regular rate and rhythm without murmur rub or gallop.  Thorax & Lungs: Clear breath sounds bilaterally without wheezes, rhonchi or rales. There is no chest wall tenderness.   Abdomen: Soft non-tender non-distended. There is no rebound or guarding. No organomegaly is appreciated. Bowel sounds are normal.  Skin: Normal without rash.   Back: No CVA or spinal tenderness.   Extremities: right lower extremity has obvious deformity at the distal tib/fib and upper ankle with lateral angulation and foot appears dusky with poor capillary refill from 6-8 seconds. Absent pulses at DP and PT. Wound that is volar aspect about 6 cc's superior to the ankle joint. Consistent with open fracture. Bone fragment protruding through.  Musculoskeletal: See extremities for further details. No major deformities noted.   Neurologic: Alert & oriented x 3, Normal motor function, Normal sensory function, No focal deficits noted. Reflexes are normal.  Psychiatric: Affect normal, Judgment normal, Mood normal. There is no suicidal ideation or patient reported hallucinations.       DIAGNOSTIC STUDIES / PROCEDURES    PROCEDURES:    Conscious Sedation Procedure Note    Indication: fracture dislocation    Consent: I have discussed with the patient and/or the patient representative the indication, alternatives, and the possible risks and/or complications of the planned procedure and the anesthesia methods. The patient and/or patient representative appear to understand and agree to proceed.    Physician Involvement: The attending physician was present and supervising this procedure.    Pre-Sedation Documentation and Exam: I have personally completed a history, physical exam & review of systems for this patient (see notes).  Airway Assessment: normal    Prior History of Anesthesia Complications: none    ASA Classification: Class 1 - A normal healthy patient    Sedation/ Anesthesia Plan: intravenous  sedation    Medications Used: propofol intravenously    Monitoring and Safety: The patient was placed on a cardiac monitor and vital signs, pulse oximetry and level of consciousness were continuously evaluated throughout the procedure. The patient was closely monitored until recovery from the medications was complete and the patient had returned to baseline status. Respiratory therapy was on standby at all times during the procedure.    (The following sections must be completed)  Post-Sedation Vital Signs: Vital signs were reviewed and were stable after the procedure (see flow sheet for vitals)            Post-Sedation Exam: Lungs: clear           Complications: none        Joint Reduction Procedure Note    Indication: fracture    Consent: The patient provided verbal consent for this procedure.    Procedure: The pre-reduction exam showed distal perfusion to be impaired. The patient was placed in the supine position. Anesthesia/pain control was obtained using conscious sedation with propofol injection. Reduction of the right ankle was performed by direct traction. Post reduction films were obtained and revealed partial but satisfactory reduction. A post-reduction exam revealed distal perfusion & neurologic function to be normal. The affected area was immobilized with posterior stirrup splint.    The patient tolerated the procedure well.    Complications: None      LABS  Labs Reviewed   CBC WITH DIFFERENTIAL - Abnormal; Notable for the following components:       Result Value    RBC 4.50 (*)     MCHC 33.6 (*)     Neutrophils-Polys 74.20 (*)     Lymphocytes 16.40 (*)     All other components within normal limits   COMP METABOLIC PANEL - Abnormal; Notable for the following components:    Glucose 106 (*)     All other components within normal limits   ESTIMATED GFR      All labs reviewed by me.    EKG Interpretation:  Results for orders placed or performed during the hospital encounter of 08/07/19   EKG (NOW)   Result  Value Ref Range    Report       [facility] Emergency Dept[facility]    Test Date:  2019  Pt Name:    YAZMIN JERRY                Department: ER  MRN:        [id number]                      Room:        54  Gender:     Male                         Technician: [id number]  :        [id number]                   Requested By:EDIE BARTON  Order #:    [id number]                    Reading MD: EDIE BARTON MD    Measurements  Intervals                                Axis  Rate:       63                           P:          4  WA:         140                          QRS:        21  QRSD:       92                           T:          23  QT:         408  QTc:        418    Interpretive Statements  SINUS RHYTHM  No previous ECG available for comparison    Electronically Signed On 2019 15:09:55 PDT by EDIE BARTON MD           RADIOLOGY  DX-CHEST-PORTABLE (1 VIEW)   Final Result      No acute cardiopulmonary disease evident.      DX-ANKLE 2- VIEWS RIGHT   Final Result      Comminuted tibial plateau and distal fibula fractures with mild posterior and lateral displacement of the principal distal fragment.      [id number] FILM X-RAY   Final Result      CT-ANKLE W/O PLUS RECONS RIGHT    (Results Pending)     The radiologist's interpretation of all radiological studies have been reviewed by me.    COURSE & MEDICAL DECISION MAKING  Nursing notes, VS, PMSFHx reviewed in chart.    2:21 PM Patient seen and examined at bedside. Ordered for Dx-ankle right to evaluate. .   2:30 PM Patient was treated with Ancef 2 g  2:35 PM The respiratory therapist is at the patient's bedside at this time.   2:36 PM The patient was sedated with Propofol injection.   2:37 PM The patient's right ankle was reduced at this time. See above procedure note for further details.   2:49 PM Patient will be treated with morphine 4 mg and Zofran 4 mg. Ordered CBC with differential and CMP.   2:55 PM Ordered Dx-chest    Laboratory  evaluation reveals no leukocytosis, significant shift, anemia, acidosis, electrolyte derangements or renal dysfunction.  Screening EKG shows no evidence of acute dysrhythmia or ischemic changes    2:58 PM I discussed the patient's case and the above findings with Dr. Mcmahan (Orthopedic) who agrees to takes the patient to the operating room.  The patient on x-ray has comminuted tibial plateau and distal fibula fractures with some moderate mild posterior and lateral displacement of the distal fragment.  CT is pending.  Antibiotics have been given for this open fracture.  He now has good vascular status with capillary refill around 2 seconds and pink toes      DISPOSITION:  Patient will be admitted to the hospital and will go to the operating room under the care of Dr. Mcmahan    FINAL IMPRESSION  1. Type I or II open fracture of distal end of right tibia, unspecified fracture morphology, initial encounter    2. Type I or II open fracture of distal end of right fibula, unspecified fracture morphology, initial encounter          Alejandra AGUIRRE (Scribnerissa), am scribing for, and in the presence of, Raudel Grant M.D..    Electronically signed by: Alejandra Bruno (Scribe), 8/7/2019    IRaudel M.D. personally performed the services described in this documentation, as scribed by Alejandra Bruno in my presence, and it is both accurate and complete.    C    The note accurately reflects work and decisions made by me.  Raudel Grant  8/7/2019  4:09 PM

## 2019-08-07 NOTE — ED NOTES
Time out for procedure @ 1430.    1435: /58  1436: 120mg of propofol  1438: 60 mg propofol  1439: /52  1439: 60 mg propofol  1442:  159/54

## 2019-08-07 NOTE — ED TRIAGE NOTES
Chief Complaint   Patient presents with   • Fall Less than 10 Feet     fell approx 6ft from a ladder, landed on RT ankle. +deformity.   • Ankle Pain   • Sent from Urgent Care     xray at  revealed fx.      Pt to triage for abova via w/c. Sent for ortho consult. Mild numbness to RT foot. Wrapped in ace wrap PTA, bleeding on arrival.     BP (!) 165/104   Pulse 77   Temp 37 °C (98.6 °F) (Temporal)   Resp 15   Ht 1.829 m (6')   Wt 79.4 kg (175 lb)   SpO2 97%   BMI 23.73 kg/m²

## 2019-08-07 NOTE — ED NOTES
Wound to inside of RT ankle, new dressing applied. +swelling. +pedal pulse. sensation intact to toes.

## 2019-08-07 NOTE — LETTER
[facility], EMERGENCY DEPT   [address], [place] [address]  Phone: Dept: [phone] - Fax:        [facility] Progress Report and Disability Certification  Date of Service: 8/7/2019   No Show:  No  Date / Time of Next Visit:     Claim Information   Patient Name: Prieto Holm  Claim Number:     Employer:   [employer] Date of Injury: 8/7/2019     Insurer / TPA: [employer] ID / SSN:    Occupation:  Diagnosis: Diagnoses of Type I or II open fracture of distal end of right tibia, unspecified fracture morphology, initial encounter and Type I or II open fracture of distal end of right fibula, unspecified fracture morphology, initial encounter were pertinent to this visit.    Medical Information   Related to Industrial Injury? Yes ***   Subjective Complaints:  Fall off ladder with right ankle pain   Objective Findings: Open fracture of right distal tib-fib   Pre-Existing Condition(s):     Assessment:   Initial Visit    Status: Additional Care Required  Permanent Disability:No    Plan: MedicationConsultation    Diagnostics: X-rayCT    Comments:  Patient had to go to the operating room for open reduction internal fixation of his open fracture by Dr. Mcmahan    Disability Information   Status: Temporarily Totally Disabled    From:     Through:   Restrictions are:     Physical Restrictions   Sitting:    Standing:    Stooping:    Bending:      Squatting:    Walking:    Climbing:    Pushing:      Pulling:    Other:    Reaching Above Shoulder (L):   Reaching Above Shoulder (R):       Reaching Below Shoulder (L):    Reaching Below Shoulder (R):      Not to exceed Weight Limits   Carrying(hrs):   Weight Limit(lb):   Lifting(hrs):   Weight  Limit(lb):     Comments: Temporary total disability until cleared by orthopedics for work duty    Repetitive Actions   Hands: i.e. Fine Manipulations from Grasping:        Feet: i.e. Operating Foot Controls:     Driving / Operate Machinery:     Physician Name: Edie Grant Physician Signature: EDIE Nguyễn M.D. e-Signature:  , Medical Director   Clinic Name / Location: [facility], EMERGENCY DEPT  [address] [place] [id number]  [phone]     Clinic Phone Number: Dept: [phone]   Appointment Time:  Visit Start Time:    Check-In Time:  1:15 PM Visit Discharge Time:    Original-Treating Physician or Chiropractor    Page 2-Insurer/TPA    Page 3-Employer    Page 4-Employee

## 2019-08-07 NOTE — CONSULTS
8/7/2019    Prieto Holm is a 68 y.o. male who presents after a fall with a right ankle fracture and is here for operative management. Patient denies numbness, parasthesias, loss of concousness or other trauma    Past Medical History:   Diagnosis Date   • Bladder cancer (HCC)     s/p tumor resection and BCG in 2010   • Cancer (HCC)    • Depression    • Gout    • Hyperlipidemia    • Hypertension    • Lip injury     feb. 2017       Past Surgical History:   Procedure Laterality Date   • BLADDER BIOPSY WITH CYSTOSCOPY      bladder cancer excision   • MENISCUS REPAIR     • OTHER ORTHOPEDIC SURGERY Left     elbow surgery       Medications  No current facility-administered medications on file prior to encounter.      Current Outpatient Medications on File Prior to Encounter   Medication Sig Dispense Refill   • atorvastatin (LIPITOR) 40 MG Tab TAKE 1 TABLET EVERY EVENING 90 Tab 2   • therapeutic multivitamin-minerals (THERAGRAN-M) Tab Take 1 Tab by mouth every day.     • lisinopril (PRINIVIL) 20 MG Tab Take 1 Tab by mouth every day. 90 Tab 3   • atorvastatin (LIPITOR) 80 MG tablet Take 1 Tab by mouth every evening. (Patient not taking: Reported on 7/30/2019) 90 Tab 3   • Melatonin 10 MG Tab Take  by mouth.     • colchicine (COLCRYS) 0.6 MG Tab Take 2 tabs PO once then 1 tab PO 1 hour later prn gout attack 3 Tab 2   • indomethacin (INDOCIN) 25 MG Cap Take 1 Cap by mouth 3 times a day as needed (gout). 90 Cap 0       Allergies  Patient has no known allergies.    ROS  Right ankle pain. All other systems were reviewed and found to be negative    Family History   Problem Relation Age of Onset   • Thyroid Mother    • Alzheimer's Disease Father    • Other Other         autism spectrum disorder       Social History     Socioeconomic History   • Marital status:      Spouse name: Not on file   • Number of children: Not on file   • Years of education: Not on file   • Highest education level: Not on file   Occupational  History   • Not on file   Social Needs   • Financial resource strain: Not on file   • Food insecurity:     Worry: Not on file     Inability: Not on file   • Transportation needs:     Medical: Not on file     Non-medical: Not on file   Tobacco Use   • Smoking status: Former Smoker     Packs/day: 1.00     Years: 35.00     Pack years: 35.00     Types: Cigarettes     Last attempt to quit: 10/18/2010     Years since quittin.8   • Smokeless tobacco: Never Used   Substance and Sexual Activity   • Alcohol use: Yes   • Drug use: No   • Sexual activity: Not Currently     Partners: Female   Lifestyle   • Physical activity:     Days per week: Not on file     Minutes per session: Not on file   • Stress: Not on file   Relationships   • Social connections:     Talks on phone: Not on file     Gets together: Not on file     Attends [employer] service: Not on file     Active member of club or organization: Not on file     Attends meetings of clubs or organizations: Not on file     Relationship status: Not on file   • Intimate partner violence:     Fear of current or ex partner: Not on file     Emotionally abused: Not on file     Physically abused: Not on file     Forced sexual activity: Not on file   Other Topics Concern   • Not on file   Social History Narrative   • Not on file       Physical Exam  Vitals  BP (!) 165/104   Pulse (!) 59   Temp 37 °C (98.6 °F) (Temporal)   Resp 15   Ht 1.829 m (6')   Wt 79.4 kg (175 lb)   SpO2 100%   General: Well Developed, Well Nourished, no acute distress  HEENT: Normocephalic, atraumatic  Eyes: Anicteric, PERRLA, EOMI  Neck: Supple, nontender, no masses  Lungs: CTA, no wheezes or crackles  Heart: RRR, no murmurs, rubs or gallops  Abdomen: Soft, NT, ND  Pelvis: Stable to AP and Lateral Compression  Skin: Open wounds ankle  Extremities: Right ankle pain and deformity  Neuro: NVI  Vascular: 2+DP/PT, Capillary refill <2 seconds    Radiographs:  TQ-[id number] FILM X-RAY   Final Result       DX-ANKLE 3+ VIEWS RIGHT    (Results Pending)   DX-CHEST-PORTABLE (1 VIEW)    (Results Pending)   CT-ANKLE W/O PLUS RECONS RIGHT    (Results Pending)       Laboratory Values  Recent Labs     08/07/19  1448   WBC 9.9   RBC 4.50*   HEMOGLOBIN 14.3   HEMATOCRIT 42.5   MCV 94.4   MCH 31.8   MCHC 33.6*   RDW 47.8   PLATELETCT 207   MPV 11.0     No results for input(s): SODIUM, POTASSIUM, CHLORIDE, CO2, GLUCOSE, BUN, CPKTOTAL in the last 72 hours.          Impression: Right ankle fracture     Plan:Operative intervention. Risks and benefits of surgery were discussed which include but are not limited to bleeding, infection, neurovascular damage, malunion, nonunion, instability, limb length discrepancy, DVT, PE, MI, Stroke and death. They understand these risks and wish to proceed.

## 2019-08-08 NOTE — DISCHARGE INSTRUCTIONS
ACTIVITY: Rest and take it easy for the first 24 hours.  A responsible adult is recommended to remain with you during that time.  It is normal to feel sleepy.  We encourage you to not do anything that requires balance, judgment or coordination.    MILD FLU-LIKE SYMPTOMS ARE NORMAL. YOU MAY EXPERIENCE GENERALIZED MUSCLE ACHES, THROAT IRRITATION, HEADACHE AND/OR SOME NAUSEA.    FOR 24 HOURS DO NOT:  Drive, operate machinery or run household appliances.  Drink beer or alcoholic beverages.   Make important decisions or sign legal documents.    SPECIAL INSTRUCTIONS:   Patient may discharge home when:    1. Tolerating oral diet  2. Voiding without difficulty  3. Pain controlled by oral medications  4. Able to ambulate safely with gait aids    DISCHARGE INSTRUCTIONS:    ACTIVITY:  The patient should remain toe touch weightbearing with the use of gait aids (crutches, walker, cane, etc).    PAIN CONTROL:  The patient has been prescribed a narcotic pain medication.  Side effects of narcotics pain medications include sedation and constipation.  To prevent constipation, it is recommended that the patient take an over the counter stool softener (such as Colace or Senna) and use laxatives as needed to prevent constipation.  Take these over the counter medications as directed by the packaging.  The patient may not drive while taking narcotic pain medication.  The patient should wean off their prescription pain medication by taking over the counter analgesics (such as Tylenol, Advil, Ibuprofen, etc).  Use caution when taking acetaminophen (Tylenol), as some narcotic medications contain acetaminophen.  The total dose of acetaminophen should not exceed 3000 mg daily.    WOUND CARE:  The patient has a surgical wound which was closed with staples or sutures.  These need to be removed 10-14 days after surgery.  If the patient is not in a splint or cast, the operative dressing should be removed 2 days after surgery, and dry gauze  dressing changes should be performed daily after that.  You may shower when the operative dressing is removed.    SPLINT/CAST CARE:  If present, you should keep your splint or cast clean, dry, and intact.  You should elevate your operative extremity to minimize swelling in your fingers or toes.  If the sensation in your fingers or toes of your operative extremity changes, or the fingers/toes change colors, or should your pain become too difficult to control, you should immediately elevate your operative extremity.  If these symptoms do not resolve after 30 minutes to 1 hour, you should seek medical care immediately.    CALL YOUR DOCTOR IF:  You have fever >101.5 degrees F, you have increased or concerning wound drainage, you notice a great deal of redness around your incision, your pain can no longer be controlled, the sensation in your fingers or toes changes and does not respond to elevation, your cast or splint becomes saturated (wet) or other concerns.  If you suffer a medical emergency, seek immediate medical care at your nearest Emergency Room.    DIET: To avoid nausea, slowly advance diet as tolerated, avoiding spicy or greasy foods for the first day.  Add more substantial food to your diet according to your physician's instructions.  Babies can be fed formula or breast milk as soon as they are hungry.  INCREASE FLUIDS AND FIBER TO AVOID CONSTIPATION.    SURGICAL DRESSING/BATHING: You may shower when the operative dressing is removed.    FOLLOW-UP APPOINTMENT:  A follow-up appointment should be arranged with your doctor in 1-2 weeks; call to schedule.    You should CALL YOUR PHYSICIAN if you develop:  Fever greater than 101 degrees F.  Pain not relieved by medication, or persistent nausea or vomiting.  Excessive bleeding (blood soaking through dressing) or unexpected drainage from the wound.  Extreme redness or swelling around the incision site, drainage of pus or foul smelling drainage.  Inability to urinate  or empty your bladder within 8 hours.  Problems with breathing or chest pain.    You should call 911 if you develop problems with breathing or chest pain.  If you are unable to contact your doctor or surgical center, you should go to the nearest emergency room or urgent care center.  Physician's telephone #: [phone] Dr Mcmahan    If any questions arise, call your doctor.  If your doctor is not available, please feel free to call the Surgical Center at [phone].  The Center is open Monday through Friday from 7AM to 7PM.  You can also call the HEALTH HOTLINE open 24 hours/day, 7 days/week and speak to a nurse at [phone], or toll free at [phone].    A registered nurse may call you a few days after your surgery to see how you are doing after your procedure.    MEDICATIONS: Resume taking daily medication.  Take prescribed pain medication with food.  If no medication is prescribed, you may take non-aspirin pain medication if needed.  PAIN MEDICATION CAN BE VERY CONSTIPATING.  Take a stool softener or laxative such as senokot, pericolace, or milk of magnesia if needed.    Prescription given for Roxicodone (pain), Bactrim (antibiotic).  Last pain medication given at 8:56pm.    If your physician has prescribed pain medication that includes Acetaminophen (Tylenol), do not take additional Acetaminophen (Tylenol) while taking the prescribed medication.    Depression / Suicide Risk    As you are discharged from this [facility] facility, it is important to learn how to keep safe from harming yourself.    Recognize the warning signs:  · Abrupt changes in personality, positive or negative- including increase in energy   · Giving away possessions  · Change in eating patterns- significant weight changes-  positive or negative  · Change in sleeping patterns- unable to sleep or sleeping all the time   · Unwillingness or inability to communicate  · Depression  · Unusual sadness, discouragement and  loneliness  · Talk of wanting to die  · Neglect of personal appearance   · Rebelliousness- reckless behavior  · Withdrawal from people/activities they love  · Confusion- inability to concentrate     If you or a loved one observes any of these behaviors or has concerns about self-harm, here's what you can do:  · Talk about it- your feelings and reasons for harming yourself  · Remove any means that you might use to hurt yourself (examples: pills, rope, extension cords, firearm)  · Get professional help from the community (Mental Health, Substance Abuse, psychological counseling)  · Do not be alone:Call your Safe Contact- someone whom you trust who will be there for you.  · Call your local CRISIS HOTLINE 893-5561 or 548-531-0307  · Call your local Children's Mobile Crisis Response Team [place] (323) 021-8826 or www.[employer]evolution  · Call the toll free National Suicide Prevention Hotlines   · National Suicide Prevention Lifeline 365-898-UTEG (6620)  · National Hope Line Network 800-SUICIDE (036-4768)

## 2019-08-08 NOTE — OR NURSING
PT from OR w/ LMA in place, LMA d/c at 2041, placed on 4L O2 via NC, PT w/ RLE external fixator in place, elevated on pillow, oozing noted at pin site.  DSG placed around pins to soak up excess drainage/ooze. CMS cap refill > 3 seconds, pulse +3, RLE pale/warm/dry, DSG 4x4 w/ tegaderm has pinkish tinted drainage noted.    PT w/ pain 7/10, Fenantyl 100 mcg and Oxycodone 10mg PO, PT reported pain 3/10, PT DTR Kandi at bedside w/ PT crutches, PT has no adult to go home to, PT states he does not wish to stay, PT DTR called ex-wife, she agreed PT could come to her home overnight, RX given to DTR for OXYCODONE and BACTRIM DS.  PT medicated w/ Fentanyl 150 mcg for pain 7/10, reported pain 3/10 at time of d/c.  PT IV site d/c w/ cath tip intact and dressing applied.  PT denied nausea.  PT wheeled to DTR car in stable condition, pain 3/10.

## 2019-08-08 NOTE — ANESTHESIA POSTPROCEDURE EVALUATION
Patient: Prieto Holm    Procedure Summary     Date:  08/07/19 Room / Location:  [place] [address] / SURGERY [facility]    Anesthesia Start:  2000 Anesthesia Stop:  2039    Procedures:       APPLICATION, EXTERNAL FIXATION DEVICE (Right Ankle)      IRRIGATION AND DEBRIDEMENT, WOUND (Right Ankle) Diagnosis:  (Right Ankle Fracture)    Surgeon:  Prem Mcmahan M.D. Responsible Provider:  Yue Scott M.D.    Anesthesia Type:  general ASA Status:  2          Final Anesthesia Type: general  Last vitals  BP   Blood Pressure : 154/77    Temp   36.9 °C (98.5 °F)    Pulse   Pulse: 63   Resp   16    SpO2   100 %      Anesthesia Post Evaluation    Patient location during evaluation: PACU  Patient participation: complete - patient participated  Level of consciousness: awake and alert  Pain score: 3    Airway patency: patent  Anesthetic complications: no  Cardiovascular status: hemodynamically stable  Respiratory status: acceptable  Hydration status: euvolemic    PONV: none           Nurse Pain Score: 3 (NPRS)

## 2019-08-08 NOTE — OP REPORT
DATE OF SERVICE:  08/07/2019    PREOPERATIVE DIAGNOSIS:  Grade II open right tibial pilon fracture.    POSTOPERATIVE DIAGNOSIS:  Grade II open right tibial pilon fracture.    PROCEDURE:  1.  Irrigation and debridement, open fracture.  2.  External fixation, right leg.  3.  Closed reduction intraarticular distal tibia fracture.    SURGEON:  Prem Mcmahan MD    ASSISTANT:  Charles Gudino PA-C    ESTIMATED BLOOD LOSS:  Minimal.    INDICATIONS:  This is a 68-year-old gentleman who fell off a ladder and   sustained an open pilon fracture.  Risks and benefits of external fixation,   irrigation and debridement of his open fracture as a staged procedure prior to   definitive fixation were discussed to include but not limited to bleeding,   infection, neurovascular damage, pain, stiffness, malunion, nonunion, DVT, PE,   MI, stroke, and death.  He understands all these risks and wished to proceed.    DESCRIPTION OF PROCEDURE:  Patient was sedated with LMA anesthesia and   administered preoperative antibiotics.  His right leg was prepped and draped   in the usual fashion.  His open wound was extended proximally and distally and   debrided of skin, subcutaneous tissue, muscle, and bone in an excisional   fashion with a knife and rongeur, irrigated with copious amounts of normal   saline solution.  The wound was then closed with nylon suture.  Attention was   then turned to the ankle, which was reduced and 2 half pins were placed in the   tibia and one transfixion pin in the calcaneus and a delta frame was   constructed.  Under fluoroscopic guidance, good reduction was held and all   clamps were tightened with pin-to-bar clamps and bars.  Sterile dressings were   applied.  Patient tolerated the procedure well.    POSTOPERATIVE PLAN:  This patient to be discharged home on oral antibiotics,   strict elevation and follow up in 1 week time for a wound check and hopeful   surgery when soft tissues allow.        ____________________________________     MD RY MILLER    DD:  08/07/2019 20:36:19  DT:  08/07/2019 23:29:48    D#:  [id number]  Job#:  [id number]

## 2019-08-08 NOTE — ANESTHESIA PROCEDURE NOTES
Airway  Date/Time: 8/7/2019 8:06 PM  Performed by: Yue Scott M.D.  Authorized by: Yue Scott M.D.     Location:  OR  Urgency:  Elective  Difficult Airway: No    Indications for Airway Management:  Anesthesia  Spontaneous Ventilation: absent    Sedation Level:  Deep  Preoxygenated: Yes    Mask Difficulty Assessment:  0 - not attempted  Final Airway Type:  Supraglottic airway  Final Supraglottic Airway:  Standard LMA  SGA Size:  4  Number of Attempts at Approach:  1

## 2019-08-08 NOTE — ANESTHESIA TIME REPORT
Anesthesia Start and Stop Event Times     Date Time Event    8/7/2019 1929 Ready for Procedure     2000 Anesthesia Start     2039 Anesthesia Stop        Responsible Staff  08/07/19    Name Role Begin End    Yue Scott M.D. Anesth 2000 2039        Preop Diagnosis (Free Text):  Pre-op Diagnosis     Right Ankle Fracture        Preop Diagnosis (Codes):    Post op Diagnosis  Tibia/fibula fracture, right, open type I or II, initial encounter      Premium Reason  A. 3PM - 7AM    Comments:

## 2019-08-08 NOTE — ANESTHESIA PREPROCEDURE EVALUATION
Relevant Problems   NEURO   (+) History of bladder cancer      CARDIAC   (+) Hypertension   (+) Vascular ectasia of skin       Physical Exam    Airway   Mallampati: I  TM distance: >3 FB  Neck ROM: full       Cardiovascular - normal exam  Rhythm: regular  Rate: normal  (-) murmur     Dental - normal exam         Pulmonary - normal exam  Breath sounds clear to auscultation     Abdominal    Neurological - normal exam               Anesthesia Plan    ASA 2       Plan - general       Airway plan will be LMA        Induction: intravenous    Postoperative Plan: Postoperative administration of opioids is intended.    Pertinent diagnostic labs and testing reviewed    Informed Consent:    Anesthetic plan and risks discussed with patient.    Use of blood products discussed with: patient whom consented to blood products.

## 2019-10-01 ENCOUNTER — TELEPHONE (OUTPATIENT)
Dept: MEDICAL GROUP | Facility: LAB | Age: 69
End: 2019-10-01

## 2019-10-18 ENCOUNTER — NON-PROVIDER VISIT (OUTPATIENT)
Dept: MEDICAL GROUP | Facility: LAB | Age: 69
End: 2019-10-18
Payer: MEDICARE

## 2019-10-18 DIAGNOSIS — Z23 NEED FOR VACCINATION: ICD-10-CM

## 2019-10-18 PROCEDURE — G0008 ADMIN INFLUENZA VIRUS VAC: HCPCS | Performed by: FAMILY MEDICINE

## 2019-10-18 PROCEDURE — 90662 IIV NO PRSV INCREASED AG IM: CPT | Performed by: FAMILY MEDICINE

## 2019-10-18 PROCEDURE — G0009 ADMIN PNEUMOCOCCAL VACCINE: HCPCS | Performed by: FAMILY MEDICINE

## 2019-10-18 PROCEDURE — 90732 PPSV23 VACC 2 YRS+ SUBQ/IM: CPT | Performed by: FAMILY MEDICINE

## 2019-10-18 NOTE — PROGRESS NOTES
"NAMAN Holm is a 68 y.o. male here for a non-provider visit for:   FLU, PPSV23    Reason for immunization: Overdue/Provider Recommended  Immunization records indicate need for vaccine: Yes, confirmed with [place] [facility]  Minimum interval has been met for this vaccine: No  ABN completed: No    Order and dose verified by: CHRISTIAN GARCIA Dated  8/15/19,4/24/15 was given to patient: Yes  All IAC Questionnaire questions were answered \"No.\"    Patient tolerated injection and no adverse effects were observed or reported: Yes    Pt scheduled for next dose in series: No  "

## 2020-02-04 ENCOUNTER — HOSPITAL ENCOUNTER (OUTPATIENT)
Dept: LAB | Facility: MEDICAL CENTER | Age: 70
End: 2020-02-04
Attending: PHYSICIAN ASSISTANT
Payer: COMMERCIAL

## 2020-02-04 LAB
BASOPHILS # BLD AUTO: 0.7 % (ref 0–1.8)
BASOPHILS # BLD: 0.05 K/UL (ref 0–0.12)
CRP SERPL HS-MCNC: 0.18 MG/DL (ref 0–0.75)
EOSINOPHIL # BLD AUTO: 0.17 K/UL (ref 0–0.51)
EOSINOPHIL NFR BLD: 2.2 % (ref 0–6.9)
ERYTHROCYTE [DISTWIDTH] IN BLOOD BY AUTOMATED COUNT: 49.4 FL (ref 35.9–50)
ERYTHROCYTE [SEDIMENTATION RATE] IN BLOOD BY WESTERGREN METHOD: 2 MM/HOUR (ref 0–20)
HCT VFR BLD AUTO: 51.9 % (ref 42–52)
HGB BLD-MCNC: 16.7 G/DL (ref 14–18)
IMM GRANULOCYTES # BLD AUTO: 0.04 K/UL (ref 0–0.11)
IMM GRANULOCYTES NFR BLD AUTO: 0.5 % (ref 0–0.9)
LYMPHOCYTES # BLD AUTO: 2.89 K/UL (ref 1–4.8)
LYMPHOCYTES NFR BLD: 37.7 % (ref 22–41)
MCH RBC QN AUTO: 30.1 PG (ref 27–33)
MCHC RBC AUTO-ENTMCNC: 32.2 G/DL (ref 33.7–35.3)
MCV RBC AUTO: 93.5 FL (ref 81.4–97.8)
MONOCYTES # BLD AUTO: 0.51 K/UL (ref 0–0.85)
MONOCYTES NFR BLD AUTO: 6.7 % (ref 0–13.4)
NEUTROPHILS # BLD AUTO: 4 K/UL (ref 1.82–7.42)
NEUTROPHILS NFR BLD: 52.2 % (ref 44–72)
NRBC # BLD AUTO: 0 K/UL
NRBC BLD-RTO: 0 /100 WBC
PLATELET # BLD AUTO: 235 K/UL (ref 164–446)
PMV BLD AUTO: 11.6 FL (ref 9–12.9)
RBC # BLD AUTO: 5.55 M/UL (ref 4.7–6.1)
WBC # BLD AUTO: 7.7 K/UL (ref 4.8–10.8)

## 2020-02-04 PROCEDURE — 85025 COMPLETE CBC W/AUTO DIFF WBC: CPT

## 2020-02-04 PROCEDURE — 85652 RBC SED RATE AUTOMATED: CPT

## 2020-02-04 PROCEDURE — 36415 COLL VENOUS BLD VENIPUNCTURE: CPT

## 2020-02-04 PROCEDURE — 86140 C-REACTIVE PROTEIN: CPT

## 2020-02-27 ENCOUNTER — HOSPITAL ENCOUNTER (OUTPATIENT)
Facility: MEDICAL CENTER | Age: 70
End: 2020-02-27
Attending: ORTHOPAEDIC SURGERY | Admitting: ORTHOPAEDIC SURGERY
Payer: COMMERCIAL

## 2020-03-24 ENCOUNTER — APPOINTMENT (OUTPATIENT)
Dept: BEHAVIORAL HEALTH | Facility: CLINIC | Age: 70
End: 2020-03-24
Payer: MEDICARE

## 2020-03-30 ENCOUNTER — HOSPITAL ENCOUNTER (OUTPATIENT)
Dept: LAB | Facility: MEDICAL CENTER | Age: 70
End: 2020-03-30
Attending: FAMILY MEDICINE
Payer: MEDICARE

## 2020-03-30 ENCOUNTER — OFFICE VISIT (OUTPATIENT)
Dept: MEDICAL GROUP | Facility: LAB | Age: 70
End: 2020-03-30
Payer: MEDICARE

## 2020-03-30 VITALS
BODY MASS INDEX: 24.65 KG/M2 | TEMPERATURE: 98 F | WEIGHT: 182 LBS | SYSTOLIC BLOOD PRESSURE: 152 MMHG | HEIGHT: 72 IN | HEART RATE: 74 BPM | DIASTOLIC BLOOD PRESSURE: 82 MMHG | OXYGEN SATURATION: 98 % | RESPIRATION RATE: 12 BRPM

## 2020-03-30 DIAGNOSIS — F33.0 MILD EPISODE OF RECURRENT MAJOR DEPRESSIVE DISORDER (HCC): ICD-10-CM

## 2020-03-30 DIAGNOSIS — H61.22 IMPACTED CERUMEN OF LEFT EAR: ICD-10-CM

## 2020-03-30 DIAGNOSIS — E78.2 MIXED HYPERLIPIDEMIA: ICD-10-CM

## 2020-03-30 DIAGNOSIS — I10 ESSENTIAL HYPERTENSION: ICD-10-CM

## 2020-03-30 PROBLEM — Z63.0 MARITAL STRESS: Status: RESOLVED | Noted: 2018-05-25 | Resolved: 2020-03-30

## 2020-03-30 PROBLEM — Z63.0 MARITAL PROBLEMS: Status: RESOLVED | Noted: 2018-09-19 | Resolved: 2020-03-30

## 2020-03-30 LAB
25(OH)D3 SERPL-MCNC: 25 NG/ML (ref 30–100)
CHOLEST SERPL-MCNC: 292 MG/DL (ref 100–199)
HDLC SERPL-MCNC: 64 MG/DL
LDLC SERPL CALC-MCNC: 197 MG/DL
TRIGL SERPL-MCNC: 155 MG/DL (ref 0–149)
TSH SERPL DL<=0.005 MIU/L-ACNC: 1.27 UIU/ML (ref 0.38–5.33)

## 2020-03-30 PROCEDURE — 84443 ASSAY THYROID STIM HORMONE: CPT

## 2020-03-30 PROCEDURE — 36415 COLL VENOUS BLD VENIPUNCTURE: CPT

## 2020-03-30 PROCEDURE — 99214 OFFICE O/P EST MOD 30 MIN: CPT | Mod: 25 | Performed by: FAMILY MEDICINE

## 2020-03-30 PROCEDURE — 80061 LIPID PANEL: CPT

## 2020-03-30 PROCEDURE — 69209 REMOVE IMPACTED EAR WAX UNI: CPT | Performed by: FAMILY MEDICINE

## 2020-03-30 PROCEDURE — 82306 VITAMIN D 25 HYDROXY: CPT | Mod: GA

## 2020-03-30 RX ORDER — LISINOPRIL 20 MG/1
20 TABLET ORAL DAILY
Qty: 90 TAB | Refills: 3 | Status: SHIPPED | OUTPATIENT
Start: 2020-03-30 | End: 2021-04-05

## 2020-03-30 RX ORDER — ATORVASTATIN CALCIUM 40 MG/1
TABLET, FILM COATED ORAL
Qty: 90 TAB | Refills: 2 | Status: SHIPPED | OUTPATIENT
Start: 2020-03-30 | End: 2021-05-24 | Stop reason: SDUPTHER

## 2020-03-30 ASSESSMENT — PATIENT HEALTH QUESTIONNAIRE - PHQ9
5. POOR APPETITE OR OVEREATING: 2 - MORE THAN HALF THE DAYS
SUM OF ALL RESPONSES TO PHQ QUESTIONS 1-9: 8
CLINICAL INTERPRETATION OF PHQ2 SCORE: 2

## 2020-03-30 ASSESSMENT — ENCOUNTER SYMPTOMS
CHILLS: 0
FEVER: 0
VOMITING: 0
SHORTNESS OF BREATH: 0
WHEEZING: 0
NAUSEA: 0
BLURRED VISION: 0
PALPITATIONS: 0

## 2020-03-30 ASSESSMENT — FIBROSIS 4 INDEX: FIB4 SCORE: 1.34

## 2020-03-30 NOTE — PROCEDURES
Ear Wax Removal  Date/Time: 3/30/2020 1:10 PM  Performed by: Eliseo Ramsay M.D.  Authorized by: Eliseo Ramsay M.D.     Anesthesia:  Local Anesthetic: none  Location details: left ear  Patient tolerance: Patient tolerated the procedure well with no immediate complications  Procedure type: irrigation   Sedation:  Patient sedated: no

## 2020-03-30 NOTE — PROGRESS NOTES
Prieto Holm is a 69 y.o. male here for   Chief Complaint   Patient presents with   • Establish Care   • Ear Fullness     Left ear, couple of year's ago last cleaning, janette sense it with some hearing loss.    • Other     Has not taking lipator and lisinopril not taken till augest        HPI:  Prieto is a very pleasant 69 y.o. male.     #Depression:  -Patient is for the last year he has had symptoms of depression including feelings of sadness, hopelessness.  He is also had difficulty with motivation, anhedonia.  He also states that he has had a lot of difficulty with his appetite, he states that he is lost several pounds because has not been able to eat.  He states a lot of this stems around a very difficult year.  Is gone through a divorce, several family health matters, recovering from a broken ankle which happened in August.  He is having passive thoughts of suicide, no formed plans at this time.  -He has known that he has had some difficulty with depression for some time and is following up with psychiatrist.  He has a visit scheduled for tomorrow.    #Hypertension:  -Chronic condition, new to me.  Longstanding history of hypertension, currently on lisinopril 20 mg daily.  He states that his not been taking any the medication since August.  He states that given his depression, it is been very difficult for him to be motivated to continue taking it at this time.  -Is not working on any diet or exercise regimen at this time.    #Hyperlipidemia:  -Chronic condition, new to me.  Longstanding history of hyperlipidemia.  Is previously been on Lipitor 40 mg daily.  He states that when using daily he denies any side effects including increased fatigue, myalgias.  -Given his depression he states is been very difficult for him to be motivated continue taking medication at this time.  -Is not working any diet or exercise regimen at this time.    #Cerumen impaction:  -Patient states is a longstanding history of  "cerumen impaction, especially in his left ear.  Today he states that he has a feeling of an \"echoing sensation\" in his left ear as well as diminished hearing.  He states that this is been gradually increasing over the last several weeks.  He is here for further evaluation and treatment with cerumen removal if needed.  -Denies any fevers, chills, discharge from ear, ear pain, trauma to the ear.    Current medicines (including changes today)  Current Outpatient Medications   Medication Sig Dispense Refill   • ibuprofen (MOTRIN) 200 MG Tab Take 400 mg by mouth every 6 hours as needed.     • sulfamethoxazole-trimethoprim (BACTRIM DS) 800-160 MG tablet Take 1 Tab by mouth 2 times a day. 20 Tab 0   • atorvastatin (LIPITOR) 40 MG Tab TAKE 1 TABLET EVERY EVENING 90 Tab 2   • therapeutic multivitamin-minerals (THERAGRAN-M) Tab Take 1 Tab by mouth every day.     • lisinopril (PRINIVIL) 20 MG Tab Take 1 Tab by mouth every day. 90 Tab 3   • Melatonin 10 MG Tab Take 5 mg by mouth at bedtime as needed.       No current facility-administered medications for this visit.      He  has a past medical history of Bladder cancer ([facility]), Cancer (HCC), Depression, Gout, Hyperlipidemia, Hypertension, and Lip injury. He also has no past medical history of Anxiety, Psychosis (HCC), Self-injurious behavior, or Suicide attempt ([facility]).  He  has a past surgical history that includes other orthopedic surgery (Left); meniscus repair; bladder biopsy with cystoscopy; external fixator application (Right, 2019); and irrigation & debridement ortho (Right, 2019).  Social History     Tobacco Use   • Smoking status: Former Smoker     Packs/day: 1.00     Years: 35.00     Pack years: 35.00     Types: Cigarettes     Last attempt to quit: 10/18/2010     Years since quittin.4   • Smokeless tobacco: Never Used   Substance Use Topics   • Alcohol use: Yes   • Drug use: No     Social History     Social History Narrative   • Not on file     Family History "   Problem Relation Age of Onset   • Thyroid Mother    • Alzheimer's Disease Father    • Other Other         autism spectrum disorder     Family Status   Relation Name Status   • Mo  Alive   • Fa     • Bro  Alive   • MGMo     • MGFa     • PGMo     • PGFa     • OTHER grandson Alive   • Jessica  Alive   • Jessica  Alive         ROS  Review of Systems   Constitutional: Negative for chills and fever.   HENT: Positive for hearing loss. Negative for ear discharge, ear pain and tinnitus.    Eyes: Negative for blurred vision.   Respiratory: Negative for shortness of breath and wheezing.    Cardiovascular: Negative for chest pain and palpitations.   Gastrointestinal: Negative for nausea and vomiting.   Skin: Negative for rash.   All other systems reviewed and are negative.       Objective:     /82 (BP Location: Right arm, Patient Position: Sitting, BP Cuff Size: Adult)   Pulse 74   Temp 36.7 °C (98 °F) (Temporal)   Resp 12   Ht 1.829 m (6')   Wt 82.6 kg (182 lb)   SpO2 98%  Body mass index is 24.68 kg/m².  Physical Exam:    Constitutional: Alert, no distress.  Skin: Warm, dry, good turgor, no rashes in visible areas.  ENMT: Lips without lesions, good dentition, oropharynx clear.  TM on right clear, pearly, normal light reflex.  TM on left side partially obstructed by cerumen impaction.    Neck: Trachea midline, no masses, no thyromegaly. No cervical or supraclavicular lymphadenopathy.  Respiratory: Unlabored respiratory effort, lungs clear to auscultation bilaterally, no wheezes, rales, or ronchi.  Cardiovascular: Normal S1, S2, RRR, no murmur, no edema.  Abdomen: Soft, non-tender, no masses, no hepatosplenomegaly.  Psych: Alert and oriented x3, normal affect and mood.    Assessment and Plan:   The following treatment plan was discussed    1. Mild episode of recurrent major depressive disorder (HCC)  -Chronic condition, new to me.  Patient has had episodes of depression previously  although never treated with medication.  At this time I do believe patient would benefit well from SSRI.  At this time patient will follow-up with psychiatry and discuss possible medication management with him.  -We will also check labs as below.  -Urged patient to keep appointment with psychiatry tomorrow.  - VITAMIN D,25 HYDROXY; Future  - TSH WITH REFLEX TO FT4; Future    2. Mixed hyperlipidemia  -Status: Stable.  Last lipid profile checked last year, unremarkable; however, patient has been off medication for the last 6 months.  We will recheck lipid panel at this time.  -We will restart Lipitor 40 mg daily.  -Continue to work on low-fat/low-cholesterol diet with daily exercise.  - Lipid Profile; Future  - atorvastatin (LIPITOR) 40 MG Tab; TAKE 1 TABLET EVERY EVENING  Dispense: 90 Tab; Refill: 2    3. Essential hypertension  -Status:Unstable.  Blood pressure slightly elevated today.  Will restart lisinopril 20 mg daily.  -Continue to work on low-salt diet, daily exercise.  - lisinopril (PRINIVIL) 20 MG Tab; Take 1 Tab by mouth every day.  Dispense: 90 Tab; Refill: 3    4. Impacted cerumen of left ear  -Impacted cerumen to be removed (see procedure note).      Records requested.  Followup: Return in about 3 months (around 6/30/2020).         This note was created using voice recognition software. I have made every reasonable attempt to correct errors, however, I do anticipate some grammatical errors.

## 2020-03-31 ENCOUNTER — OFFICE VISIT (OUTPATIENT)
Dept: BEHAVIORAL HEALTH | Facility: CLINIC | Age: 70
End: 2020-03-31
Payer: MEDICARE

## 2020-03-31 VITALS — BODY MASS INDEX: 24.92 KG/M2 | RESPIRATION RATE: 16 BRPM | HEIGHT: 72 IN | WEIGHT: 184 LBS

## 2020-03-31 DIAGNOSIS — F41.1 GENERALIZED ANXIETY DISORDER: ICD-10-CM

## 2020-03-31 DIAGNOSIS — F33.1 DEPRESSION, MAJOR, RECURRENT, MODERATE (HCC): ICD-10-CM

## 2020-03-31 PROCEDURE — 90792 PSYCH DIAG EVAL W/MED SRVCS: CPT | Performed by: PSYCHIATRY & NEUROLOGY

## 2020-03-31 RX ORDER — ESCITALOPRAM OXALATE 20 MG/1
TABLET ORAL
Qty: 30 TAB | Refills: 2 | Status: SHIPPED
Start: 2020-03-31 | End: 2020-05-07

## 2020-03-31 RX ORDER — TRAZODONE HYDROCHLORIDE 50 MG/1
50 TABLET ORAL
Qty: 30 TAB | Refills: 2 | Status: SHIPPED | OUTPATIENT
Start: 2020-03-31 | End: 2020-05-07 | Stop reason: SDUPTHER

## 2020-03-31 ASSESSMENT — PATIENT HEALTH QUESTIONNAIRE - PHQ9
5. POOR APPETITE OR OVEREATING: 1
7. TROUBLE CONCENTRATING ON THINGS, SUCH AS READING THE NEWSPAPER OR WATCHING TELEVISION: 0
3. TROUBLE FALLING OR STAYING ASLEEP OR SLEEPING TOO MUCH: 2
8. MOVING OR SPEAKING SO SLOWLY THAT OTHER PEOPLE COULD HAVE NOTICED. OR THE OPPOSITE, BEING SO FIGETY OR RESTLESS THAT YOU HAVE BEEN MOVING AROUND A LOT MORE THAN USUAL: 0
1. LITTLE INTEREST OR PLEASURE IN DOING THINGS: 3
4. FEELING TIRED OR HAVING LITTLE ENERGY: 2
2. FEELING DOWN, DEPRESSED, IRRITABLE, OR HOPELESS: 1
SUM OF ALL RESPONSES TO PHQ QUESTIONS 1-9: 11
9. THOUGHTS THAT YOU WOULD BE BETTER OFF DEAD, OR OF HURTING YOURSELF: 2
SUM OF ALL RESPONSES TO PHQ9 QUESTIONS 1 AND 2: 4
6. FEELING BAD ABOUT YOURSELF - OR THAT YOU ARE A FAILURE OR HAVE LET YOURSELF OR YOUR FAMILY DOWN: 0

## 2020-03-31 ASSESSMENT — FIBROSIS 4 INDEX: FIB4 SCORE: 1.34

## 2020-03-31 NOTE — PROGRESS NOTES
" [facility] INITIAL PSYCHIATRIC EVALUATION    This provider informed the patient their medical records are totally confidential except for the use by other providers involved in their care, or if the patient signs a release, or to report instances of child or elder abuse, or if it is determined they are an immediate risk to harm themselves or others.    TOTAL FACE-TO-FACE TIME 60 minutes    CHIEF COMPLAINT       Having worsening depression and lacking motivation since divorce from second wife in May 2019.    IDENTIFYING INFORMATION       The patient is a  W male who lives in a home in [place] 2 mile from his stepdaughter's home.    HISTORY OF PRESENT ILLNESS       The patient has had recurrent Major Depression since his second wife filed for divorce in May 2018.  He and his second spouse bought a house in [place] and he eventually bought her out of this house and is now living in it.    He was followed by Dr. Yaneth Mares in the Intensive Outpatient Program from May 2018 to Jul 2018 but declined antidepressant medication at that time.  His episodes of Major Depression have been characterized by anhedonia, markedly decreased sleep (with 4 to 6 hours sleep/night) and he has EMA and has a hard time getting back to sleep, he has decreased appetite (he has lost 30 pounds), decreased energy, concentration, interest, and motivation,  He feels worthless and hopeless, and he has recurrent passive suicidal ideation that life isn't worth it.  He will have difficulty getting out of bed and he procrastinates and asks himself why accomplish anything.  He is living alone and is at times overcome by loneliness.       Ironically, while he was out of state helping a brother who had marital problems move in [place], [place] in May 2018, his wife had the help of her daughter to pack up and move out of their home.  He was accused of being the \"bad robin\"  In his second " "marriage and \"always wanting things his way\".  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  He feels he is in a holding pattern and his life is stagnating.  He has always been an active person and I has been very difficult to be immobilized.  He feels he will be gimpy when walking until he has the hardware removed from his (R) ankle.  He has felt useless because he couldn't get up, walk, or shower.  It is particularly difficult because the patient has never tried to have happiness just for himself, he always has been living to make his family happy.       He also has Generalized Anxiety Disorder and excessive worries and concerns and difficulty controlling his thoughts.  A lot of the things he worries abouit are out of his control.  He gets fatigued very easily and he gets tired quickly when he tries to do anything.  He has constant muscle tension in his shoulders.  He can't go to a gym or bicycle because of his ankle.       He was advised by a therapist in Aug 2018 that he should wait 3 years before starting a new relationship.  He finds this difficult to accept because \"he is 68yo and \"not getting any younger\".    PSYCHIATRIC REVIEW OF SYSTEMS  denies manic symptoms, denies psychotic symptoms including AH / VH, denies OCD symptoms, denies restrictive eating or purging, denies trauma related symptoms, see HPI for depressive symptoms and see HPI for anxeity symptoms    MEDICAL REVIEW OF SYSTEMS:   Constitutional negative   Eyes negative   Ears/Nose/Mouth/Throat negative   Cardiovascular negative   Respiratory negative   Gastrointestinal negative   Genitourinary positive - bladder cancer, BCG treatment   Muscular negative   Integumentary positive - broken (R) ankle   Neurological negative   Endocrine positive - " prediabetic, hyperlipidemia   Hematologic/Lymphatic negative       PAST PSYCHIATRIC HISTORY       Recurrent Major Depression and Generalized Anxiety Disorder.  He denies ever having panic attacks, OCD, manic or hypomanic episodes.  PAST PSYCHIATRIC MEDICATIONS       none  SOCIAL HISTORY       Born and raised in [place] and raised there until age 6yo and then moved to [facility] to when he was 14yo.  When he was 19yo, his family moved to [place], [place].  He has been  and  twice.  DRUGS none  ALCOHOL a few beers 2 to 3 times a week  TOBACCO former smoker    MEDICAL HISTORY       Hypertension, gout, bladder cancer, chronic (L) knee pain, broken (R) ankle, hyperlipidemia.  CURRENT MEDICATIONS        Atorvastatin 40mg po QHS.        Lisinopril 20mg po daily.  ALLERGIES       none  MENTAL STATUS EXAMINATION    Resp 16   Ht 1.829 m (6')   Wt 83.5 kg (184 lb)   BMI 24.95 kg/m²   Participation: Active verbal participation  Grooming:Casual  Orientation: Fully Oriented  Eye contact: Good  Behavior:Calm   Mood: Depressed  Affect: Constricted  Thought process: Logical  Thought content:  Within normal limits  Speech: Rate within normal limits and Volume within normal limits  Perception:  Within normal limits  Memory:  No gross evidence of memory deficits  Insight: Good  Judgment: Good  Family/couple interaction observations:   Other:    CURRENT RISK       Suicidal:Low       Homicidal:Not applicable       Self-Harm:Low       Relapse:Moderate       Crisis Safety Plan Reviewed Not Indicated    ASSESSMENT       The patient will be started on escitalopram 10mg po daily X 1 week, then increased to 20mg po daily thereafter.  He will be started on trazodone 50mg po QHS as needed for sleep.                 DIFFERENTIAL DIAGNOSES       (1) Major Depression, Recurrent, Moderate (F33.1)       (2) Generalized Anxiety Disorder (F41.1)  PLAN       Start escitalopram 10mg po daily X 1 week, then increase dosage to 20mg po daily  thereafter for depression and anxiety.       Start trazodone 50mg po QHS as needed for sleep.       RTC to [facility] in 10 weeks for 30 min follow up with this provider.    Patient was seen for 60 minutes face to face of which > 50% of appointment time was spent on counseling and coordination of care regarding the above.

## 2020-05-06 NOTE — PROGRESS NOTES
[facility] PSYCHIATRIC FOLLOW-UP NOTE    This provider informed the patient their medical records are totally confidential except for the use by other providers involved in their care, or if the patient signs a release, or to report instances of child or elder abuse, or if it is determined they are an immediate risk to harm themselves or others.  To avoid spread of covid-19 virus this appointment was conducted by telehealth.  The patient gave consent to have this follow up session by video telehealth.    Time at beginning of session:  08:00 AM    TOTAL FACE-TO-FACE TIME  30 minutes    CHIEF COMPLAINT       In depressed mood since his divorce from his second wife in May 2019.    HISTORY OF PRESENT ILLNESS       The patient is a  70yo W male who lives in [place] and was evaluated on 03/31/2020 by this provider for recurrent Major Depression and Generalized Anxiety Disorder.  His episodes of depression have been characterized by a typical pattern of anhedonia and decreased sleep and appetite.  He has difficulty getting out of bed and passive suicidal ideation.  His depression was exacerbated by the unexpected surprise of his wife asking for divorce and moving out while he was out of state.  He had no prior preparation for this rift.       He had surgery to take part of the hardware out of his (R) ankle removing screws and a plate so that he would have more mobility.       He also has had Generalized Anxiety Disorder characterized by feeling tense and restless, having excessive worry and concern, having muscle tension in his neck, shoulders, and upper back and irritability.  He had a severely fractured (R) ankle in Aug 2019.  He had surgery 10 days ago to take part of the hardware out of his (R) ankle removing screws and a plate so that he would have more mobility.       He was started on escitalopram 20mg po daily for his depression and anxiety and  trazodone 50mg po QHS as needed for sleep.  However, he cannot tolerate the escitalopram because of nausea and diarrhea.         His ex-wife is been nice to him and actually helped him during his recovery from surgery.    PSYCHOSOCIAL CHANGES SINCE PREVIOUS CONTACT       The patient has been convalescing from recent ankle surgery.  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  RESPONSE TO TREATMENT       The patient has had partial alleviation of   PAST PSYCHIATRIC MEDICATIONS       Escitalopram, trazodone  MEDICATION SIDE EFFECTS       The patient had abdominal distress, diarrhea, and nausea after taking escitalopram 10mg po daily.  He switched the dosage to a QHS dosage.  He finds these side effects intolerable.  MEDICAL REVIEW OF SYSTEMS:   Constitutional negative   Eyes negative   Ears/Nose/Mouth/Throat negative   Cardiovascular negative   Respiratory negative   Gastrointestinal negative   Genitourinary positive - bladder cancer, BCG treatment   Muscular negative   Integumentary positive - broken (R) ankle   Neurological negative   Endocrine positive - prediabetic, hyperlipidemia   Hematologic/Lymphatic negative       MENTAL STATUS EVALUATION  There were no vitals taken for this visit.  Participation: Active verbal participation  Grooming:Casual  Orientation: Fully Oriented  Eye contact: Good  Behavior:Calm   Mood: Depressed  Affect: Constricted  Thought process: Logical  Thought content:  Within normal limits  Speech: Rate within normal limits and Volume within normal limits  Perception:  Within normal limits  Memory:  No gross evidence of memory deficits  Insight: Good  Judgment: Good  Family/couple interaction observations:   Other:    Current risk:    Suicide: Low   Homicide: Not applicable   Self-harm: Low  Relapse: Moderate  Other:   Crisis Safety Plan reviewed?No  If evidence of imminent risk is present, intervention/plan:    Medical Records/Labs/Diagnostic Tests Reviewed:  no    Medical Records/Labs/Diagnostic Tests Ordered: no    DIAGNOSTIC IMPRESSIONS       (1) Major Depression, Recurrent, Moderate (F33.1)       (2) Generalized Anxiety Disorder (F41.1)    ASSESSMENT AND PLAN       The patient had diarrhea and nausea as side effects from escitalopram which he finds intolerable.  His escitalopram 10mg po daily will be discontinued and he will be started on paroxetine 20mg po QHS X 1 week, then increased to 40mg po QHS thereafter for depression and anxiety.       Start paroxetine 20mg po QHS X 1 week, then increase dosage to 40mg po QHS thereafter.       Continue trazodone 50mg po QHS as needed for sleep.       RTC to  Clinic in 3 months for 30 min follow up with this provider.    Time at end of session:  08:30 AM    30 minutes spent in psychotherapy  Topics discussed in psychotherapy include:  Discussed how to restore a sense of meaning and self-worth.  Recommended resumption of exercise as soon as hardware is removed from (R) ankle.

## 2020-05-07 ENCOUNTER — TELEMEDICINE (OUTPATIENT)
Dept: BEHAVIORAL HEALTH | Facility: CLINIC | Age: 70
End: 2020-05-07
Payer: MEDICARE

## 2020-05-07 VITALS — BODY MASS INDEX: 24.38 KG/M2 | WEIGHT: 180 LBS | HEIGHT: 72 IN

## 2020-05-07 DIAGNOSIS — F33.1 DEPRESSION, MAJOR, RECURRENT, MODERATE (HCC): ICD-10-CM

## 2020-05-07 DIAGNOSIS — F41.1 GENERALIZED ANXIETY DISORDER: ICD-10-CM

## 2020-05-07 PROCEDURE — 90833 PSYTX W PT W E/M 30 MIN: CPT | Mod: 95,CR | Performed by: PSYCHIATRY & NEUROLOGY

## 2020-05-07 PROCEDURE — 99214 OFFICE O/P EST MOD 30 MIN: CPT | Mod: 95,CR | Performed by: PSYCHIATRY & NEUROLOGY

## 2020-05-07 RX ORDER — PAROXETINE HYDROCHLORIDE 20 MG/1
TABLET, FILM COATED ORAL
Qty: 60 TAB | Refills: 2 | Status: SHIPPED | OUTPATIENT
Start: 2020-05-07 | End: 2020-06-15

## 2020-05-07 RX ORDER — OXYCODONE HYDROCHLORIDE 5 MG/1
TABLET ORAL
COMMUNITY
Start: 2020-04-27 | End: 2020-07-08

## 2020-05-07 RX ORDER — TRAZODONE HYDROCHLORIDE 50 MG/1
50 TABLET ORAL
Qty: 30 TAB | Refills: 2 | Status: SHIPPED | OUTPATIENT
Start: 2020-05-07 | End: 2020-06-15 | Stop reason: SDUPTHER

## 2020-05-07 ASSESSMENT — FIBROSIS 4 INDEX: FIB4 SCORE: 1.34

## 2020-06-14 NOTE — PROGRESS NOTES
[facility] PSYCHIATRIC FOLLOW-UP NOTE    This provider informed the patient their medical records are totally confidential except for the use by other providers involved in their care, or if the patient signs a release, or to report instances of child or elder abuse, or if it is determined they are an immediate risk to harm themselves or others.  To avoid spread of covid-19 virus this appointment was conducted by telehealth.  The patient gave consent to have this follow up session by video telehealth.    Time at beginning of call:  10:30 AM    TOTAL FACE-TO-FACE TIME  30 minutes    CHIEF COMPLAINT       Having depressed mood and decreased motivation since divorce from 2nd wife.    HISTORY OF PRESENT ILLNESS       The patient is a  68yo W male who  his 2nd spouse in May 2018.  He is followed by this provider for recurrent Major Depression and Generalized Anxiety Disorder.  His episodes of depression have had a typical pattern of anhedonia, and decreased sleep, appetite, energy, and motivation.  He has difficulty getting out of bed.  A major set back was his fall off a ladder and shattering his (R) ankle and he was in a cast for 4 and a half months.         He also has Generalized Anxiety Disorder with excessive worries and concerns, easy fatigue, and muscle tension ion his shoulders.  He has been unable to exercise because of his ankle and the covid-19 pandemic.       He talks to his second ex-spouse every day and he goes out with her and his children to a restaurant occasionally.  He feels he is lacking in motivation.   His paroxetine is being discontinue and he is being started on duloxetine DR 60mg for depression, anxiety, and motivation.    PSYCHOSOCIAL CHANGES SINCE PREVIOUS CONTACT       The patient is having the hardware taken out of his ankle and he continues to socialize with his ex-second spouse.  RESPONSE TO TREATMENT       The patient  actually didn't take his paroxetine because he was afraid of having side effect.  PAST PSYCHIATRIC MEDICATIONS       Escitalopram, trazodone  MEDICATION SIDE EFFECTS       Didn't take paroxetine    MEDICAL REVIEW OF SYSTEMS:   Constitutional negative   Eyes negative   Ears/Nose/Mouth/Throat negative   Cardiovascular negative   Respiratory negative   Gastrointestinal negative   Genitourinary positive - bladder cancer, BCG treatment   Muscular negative   Integumentary positive - broken (R) ankle   Neurological negative   Endocrine positive - prediabetic, hyperlipidemia   Hematologic/Lymphatic negative       MENTAL STATUS EVALUATION  There were no vitals taken for this visit.  Participation: Active verbal participation  Grooming:neat  Orientation: Fully Oriented  Eye contact: Good  Behavior:Calm   Mood: Depressed  Affect: Constricted  Thought process: Logical  Thought content:  Within normal limits  Speech: Rate within normal limits and Volume within normal limits  Perception:  Within normal limits  Memory:  No gross evidence of memory deficits  Insight: Adequate  Judgment: Adequate  Family/couple interaction observations:   Other:    Current risk:    Suicide: Low   Homicide: Not applicable   Self-harm: Low  Relapse: Moderate  Other:   Crisis Safety Plan reviewed?No  If evidence of imminent risk is present, intervention/plan:    Medical Records/Labs/Diagnostic Tests Reviewed: no    Medical Records/Labs/Diagnostic Tests Ordered: no    DIAGNOSTIC IMPRESSIONS       (1) Major Depression, Recurrent, Moderate (F33.1)       (2) Generalized Anxiety Disorder (F41.1)    ASSESSMENT AND PLAN       Having continued depressed mood and never too paroxetine 2omg po QHS.  He has restoration of sleep by trazodone 50mg po QHS as needed.       Discontinue paroxetine 20mg po QHS (patient never tried it).       Start duloxetine DR 60 mg po QHS for depression and anxiety.        Continue trazodone 50mg po QHS as needed.       RTC to   Clinic in 3 months for 30 min follow up with this provider.    Time at end of call:  11:00 AM    30 minutes spent in psychotherapy  Topics addressed in psychotherapy include:  How always taking care of others led to depression after divorce.  Living in the same house after divorce compounded his loneliness.  Discussed how attending courses at [facility] could decrease isolation.

## 2020-06-15 ENCOUNTER — TELEMEDICINE (OUTPATIENT)
Dept: BEHAVIORAL HEALTH | Facility: CLINIC | Age: 70
End: 2020-06-15
Payer: MEDICARE

## 2020-06-15 VITALS — WEIGHT: 185 LBS | HEIGHT: 72 IN | BODY MASS INDEX: 25.06 KG/M2

## 2020-06-15 DIAGNOSIS — F33.1 DEPRESSION, MAJOR, RECURRENT, MODERATE (HCC): ICD-10-CM

## 2020-06-15 DIAGNOSIS — F43.21 INSOMNIA SECONDARY TO SITUATIONAL DEPRESSION: ICD-10-CM

## 2020-06-15 DIAGNOSIS — F41.1 GENERALIZED ANXIETY DISORDER: ICD-10-CM

## 2020-06-15 DIAGNOSIS — F51.05 INSOMNIA SECONDARY TO SITUATIONAL DEPRESSION: ICD-10-CM

## 2020-06-15 PROCEDURE — 99213 OFFICE O/P EST LOW 20 MIN: CPT | Mod: 95,CR | Performed by: PSYCHIATRY & NEUROLOGY

## 2020-06-15 PROCEDURE — 90833 PSYTX W PT W E/M 30 MIN: CPT | Mod: 95,CR | Performed by: PSYCHIATRY & NEUROLOGY

## 2020-06-15 RX ORDER — DULOXETIN HYDROCHLORIDE 60 MG/1
60 CAPSULE, DELAYED RELEASE ORAL
Qty: 30 CAP | Refills: 2 | Status: SHIPPED | OUTPATIENT
Start: 2020-06-15 | End: 2020-07-15

## 2020-06-15 RX ORDER — TRAZODONE HYDROCHLORIDE 50 MG/1
50 TABLET ORAL
Qty: 30 TAB | Refills: 2 | Status: SHIPPED | OUTPATIENT
Start: 2020-06-15 | End: 2020-12-21 | Stop reason: SDUPTHER

## 2020-06-15 ASSESSMENT — FIBROSIS 4 INDEX: FIB4 SCORE: 1.34

## 2020-07-08 ENCOUNTER — HOSPITAL ENCOUNTER (OUTPATIENT)
Dept: LAB | Facility: MEDICAL CENTER | Age: 70
End: 2020-07-08
Attending: FAMILY MEDICINE
Payer: MEDICARE

## 2020-07-08 ENCOUNTER — OFFICE VISIT (OUTPATIENT)
Dept: MEDICAL GROUP | Facility: LAB | Age: 70
End: 2020-07-08
Payer: MEDICARE

## 2020-07-08 VITALS
RESPIRATION RATE: 12 BRPM | BODY MASS INDEX: 25.19 KG/M2 | SYSTOLIC BLOOD PRESSURE: 122 MMHG | OXYGEN SATURATION: 98 % | DIASTOLIC BLOOD PRESSURE: 82 MMHG | TEMPERATURE: 98.7 F | HEIGHT: 72 IN | WEIGHT: 186 LBS | HEART RATE: 62 BPM

## 2020-07-08 DIAGNOSIS — M25.562 CHRONIC PAIN OF LEFT KNEE: ICD-10-CM

## 2020-07-08 DIAGNOSIS — I10 ESSENTIAL HYPERTENSION: ICD-10-CM

## 2020-07-08 DIAGNOSIS — R73.01 IMPAIRED FASTING GLUCOSE: ICD-10-CM

## 2020-07-08 DIAGNOSIS — G89.29 CHRONIC PAIN OF LEFT KNEE: ICD-10-CM

## 2020-07-08 LAB
ALBUMIN SERPL BCP-MCNC: 4.6 G/DL (ref 3.2–4.9)
ALBUMIN/GLOB SERPL: 2.3 G/DL
ALP SERPL-CCNC: 90 U/L (ref 30–99)
ALT SERPL-CCNC: 18 U/L (ref 2–50)
ANION GAP SERPL CALC-SCNC: 15 MMOL/L (ref 7–16)
AST SERPL-CCNC: 14 U/L (ref 12–45)
BILIRUB SERPL-MCNC: 0.6 MG/DL (ref 0.1–1.5)
BUN SERPL-MCNC: 14 MG/DL (ref 8–22)
CALCIUM SERPL-MCNC: 10.5 MG/DL (ref 8.5–10.5)
CHLORIDE SERPL-SCNC: 103 MMOL/L (ref 96–112)
CO2 SERPL-SCNC: 19 MMOL/L (ref 20–33)
CREAT SERPL-MCNC: 0.9 MG/DL (ref 0.5–1.4)
EST. AVERAGE GLUCOSE BLD GHB EST-MCNC: 120 MG/DL
FASTING STATUS PATIENT QL REPORTED: NORMAL
GLOBULIN SER CALC-MCNC: 2 G/DL (ref 1.9–3.5)
GLUCOSE SERPL-MCNC: 105 MG/DL (ref 65–99)
HBA1C MFR BLD: 5.8 % (ref 0–5.6)
POTASSIUM SERPL-SCNC: 4.5 MMOL/L (ref 3.6–5.5)
PROT SERPL-MCNC: 6.6 G/DL (ref 6–8.2)
SODIUM SERPL-SCNC: 137 MMOL/L (ref 135–145)

## 2020-07-08 PROCEDURE — 99214 OFFICE O/P EST MOD 30 MIN: CPT | Performed by: FAMILY MEDICINE

## 2020-07-08 PROCEDURE — 83036 HEMOGLOBIN GLYCOSYLATED A1C: CPT | Mod: GA

## 2020-07-08 PROCEDURE — 36415 COLL VENOUS BLD VENIPUNCTURE: CPT

## 2020-07-08 PROCEDURE — 80053 COMPREHEN METABOLIC PANEL: CPT

## 2020-07-08 ASSESSMENT — ENCOUNTER SYMPTOMS
BLURRED VISION: 0
SHORTNESS OF BREATH: 0
CHILLS: 0
NAUSEA: 0
BLOOD IN STOOL: 0
CONSTIPATION: 0
PALPITATIONS: 0
ABDOMINAL PAIN: 0
WHEEZING: 0
DOUBLE VISION: 0
FEVER: 0
DIARRHEA: 0
VOMITING: 0

## 2020-07-08 ASSESSMENT — PATIENT HEALTH QUESTIONNAIRE - PHQ9
CLINICAL INTERPRETATION OF PHQ2 SCORE: 2
SUM OF ALL RESPONSES TO PHQ QUESTIONS 1-9: 5
5. POOR APPETITE OR OVEREATING: 1 - SEVERAL DAYS

## 2020-07-08 ASSESSMENT — FIBROSIS 4 INDEX: FIB4 SCORE: 1.34

## 2020-07-08 NOTE — PROGRESS NOTES
Subjective:   Prieto Holm is a 69 y.o. male here today for   Chief Complaint   Patient presents with   • Follow-Up         #Hypertension:  -Patient recently restarted medication for hypertension.  Currently on lisinopril 20 mg daily, compliant medication, denies any side effects including lightheadedness, dizziness, shortness of breath, syncope or presyncopal episodes.  -Currently not checking her blood pressure at home working on appropriate diet, exercise.  He has no concerns at this time.    #Impaired fasting glucose:  -Patient is a history of impaired fasting glucose, states he is never been diagnosed with diabetes.  Never on any medication for treatment thereof.  Denies increased fatigue, polydipsia, polyuria, numbness/tingling/pain in lower extremities.    #Left knee osteoarthritis:  -Chronic condition, new to me, longstanding history of left knee osteoarthritis, previous treatments include physical therapy, steroid injection.  Previous urine injection was back in 2018.  After recent ankle surgery he has been having some difficulty with his knee.  Has noted some increased swelling, pain; however, as his ankles feel the knee is also been feeling much better.  He denies any acute trauma, current swelling, redness, tenderness to palpation.    No Known Allergies      Current medicines (including changes today)  Current Outpatient Medications   Medication Sig Dispense Refill   • traZODone (DESYREL) 50 MG Tab Take 1 Tab by mouth at bedtime as needed for Sleep for up to 90 days. 30 Tab 2   • atorvastatin (LIPITOR) 40 MG Tab TAKE 1 TABLET EVERY EVENING 90 Tab 2   • lisinopril (PRINIVIL) 20 MG Tab Take 1 Tab by mouth every day. 90 Tab 3   • DULoxetine (CYMBALTA) 60 MG Cap DR Particles delayed-release capsule Take 1 Cap by mouth every bedtime for 30 days. 30 Cap 2   • oxyCODONE immediate-release (ROXICODONE) 5 MG Tab        No current facility-administered medications for this visit.      He  has a past  "medical history of Bladder cancer (HCC), Cancer (HCC), Closed right ankle fracture, Depression, Generalized anxiety disorder (3/31/2020), Gout, Hyperlipidemia, Hypertension, and Lip injury. He also has no past medical history of Psychosis (HCC), Self-injurious behavior, or Suicide attempt (HCC).    ROS   Review of Systems   Constitutional: Negative for chills and fever.   HENT: Negative for hearing loss.    Eyes: Negative for blurred vision and double vision.   Respiratory: Negative for shortness of breath and wheezing.    Cardiovascular: Negative for chest pain and palpitations.   Gastrointestinal: Negative for abdominal pain, blood in stool, constipation, diarrhea, nausea and vomiting.   Skin: Negative for rash.      Objective:     Physical Exam:  /82 (BP Location: Right arm, Patient Position: Sitting, BP Cuff Size: Adult)   Pulse 62   Temp 37.1 °C (98.7 °F) (Temporal)   Resp 12   Ht 1.829 m (6' 0.01\")   Wt 84.4 kg (186 lb)   SpO2 98%  Body mass index is 25.22 kg/m².   Constitutional: Alert, no distress.  Skin: Warm, dry, good turgor, no rashes in visible areas.  Eye: Equal, round and reactive, conjunctiva clear, lids normal.  ENMT: TM's clear bilaterally, lips without lesions, good dentition, oropharynx clear.  Neck: Trachea midline, no masses, no thyromegaly. No cervical or supraclavicular lymphadenopathy.  Respiratory: Unlabored respiratory effort, lungs clear to auscultation, no wheezes, no rhonchi.  Cardiovascular: Normal S1, S2, no murmur, no edema.  Abdomen: Soft, non-tender, no masses, no hepatosplenomegaly.  Psych: Alert and oriented x3, normal affect and mood.    Assessment and Plan:     1. Impaired fasting glucose  -Discussed the importance of a healthy low sugar/low carbohydrate diet, daily exercise.  Will recheck hemoglobin A1c, CMP at this time.  -We will call patient with results.  - HEMOGLOBIN A1C; Future  - Comp Metabolic Panel; Future    2. Essential hypertension  -Status: Stable.  " Blood pressure today unremarkable at 122/82.  We will continue the lisinopril.  Will check kidney function, potassium at this time.  Will call patient with results.  - Comp Metabolic Panel; Future    3. Chronic pain of left knee  -Status: Stable.  At this time patient doing well, he will call and schedule appointment for knee injection if pain increases.  Discussed the benefits and risks of a intra-articular cortisone injection including chance of infection, bleeding, or long-term effects of steroid including worsening of osteoarthritis. Patient states he understood these risks and will call for appointment if needed.    Patient was seen for 25 minutes face to face of which > 50% of appointment time was spent on counseling and coordination of care regarding the above.    Followup: Return in about 6 months (around 1/8/2021) for Medicare AW.         PLEASE NOTE: This dictation was created using voice recognition software. I have made every reasonable attempt to correct obvious errors, but I expect that there are errors of grammar and possibly content that I did not discover before finalizing the note.

## 2020-08-06 ENCOUNTER — OFFICE VISIT (OUTPATIENT)
Dept: MEDICAL GROUP | Facility: LAB | Age: 70
End: 2020-08-06
Payer: MEDICARE

## 2020-08-06 ENCOUNTER — TELEPHONE (OUTPATIENT)
Dept: MEDICAL GROUP | Facility: LAB | Age: 70
End: 2020-08-06

## 2020-08-06 VITALS
RESPIRATION RATE: 12 BRPM | SYSTOLIC BLOOD PRESSURE: 122 MMHG | WEIGHT: 189 LBS | TEMPERATURE: 97.9 F | HEIGHT: 72 IN | OXYGEN SATURATION: 97 % | DIASTOLIC BLOOD PRESSURE: 70 MMHG | HEART RATE: 61 BPM | BODY MASS INDEX: 25.6 KG/M2

## 2020-08-06 DIAGNOSIS — M17.12 PRIMARY OSTEOARTHRITIS OF LEFT KNEE: ICD-10-CM

## 2020-08-06 DIAGNOSIS — R10.13 DYSPEPSIA: ICD-10-CM

## 2020-08-06 PROCEDURE — 99214 OFFICE O/P EST MOD 30 MIN: CPT | Performed by: FAMILY MEDICINE

## 2020-08-06 RX ORDER — TRIAMCINOLONE ACETONIDE 40 MG/ML
40 INJECTION, SUSPENSION INTRA-ARTICULAR; INTRAMUSCULAR ONCE
Status: COMPLETED | OUTPATIENT
Start: 2020-08-06 | End: 2020-08-06

## 2020-08-06 RX ADMIN — TRIAMCINOLONE ACETONIDE 40 MG: 40 INJECTION, SUSPENSION INTRA-ARTICULAR; INTRAMUSCULAR at 16:37

## 2020-08-06 ASSESSMENT — FIBROSIS 4 INDEX: FIB4 SCORE: 0.95

## 2020-08-06 NOTE — TELEPHONE ENCOUNTER
Phone Number Called: There are no phone numbers on file.      Call outcome: Spoke to patient regarding message below.    Message: Schedule patient same day appointment.

## 2020-08-06 NOTE — TELEPHONE ENCOUNTER
1. Caller Name: Prieto Holm      Call Back Number: There are no phone numbers on file.        How would the patient prefer to be contacted with a response: Phone call do NOT leave a detailed message    Patient called and LVM asking to schedule an appt, left knee pain.

## 2020-08-18 ASSESSMENT — ENCOUNTER SYMPTOMS
CHILLS: 0
FEVER: 0
NAUSEA: 1
CONSTIPATION: 0
WEIGHT LOSS: 0
HEARTBURN: 1
VOMITING: 0
PALPITATIONS: 0
DIARRHEA: 0
ABDOMINAL PAIN: 0
BLOOD IN STOOL: 0

## 2020-08-18 NOTE — PROGRESS NOTES
Subjective:   Prieto Holm is a 69 y.o. male here today for   Chief Complaint   Patient presents with   • Knee Pain       #Osteoarthritis:  -Patient had a longstanding history of osteoarthritis of left knee.  Was discussed in previous visits.  He has had previous treatments including corticosteroid injection approximately 1 year ago.  He presents today given the increasing amount of pain in his knee.  He states that he has a fairly constant pain, worse with standing and movement, no palliative or provocative measures noted.  He denies any acute trauma, swelling to the knee.  He is here today to discuss corticosteroid injection.    #Dyspepsia:  -Patient states his been having abdominal fullness, early satiety, nausea and eating foods.  He does have a history of GERD which he has been treating with over-the-counter PPIs as well as Tums.  He states his symptoms have worsened.  Denies any difficulty eating, difficulty swallowing, pain with swallowing, blood in stool, black or tarry stools.  He states he does have a family history of H. pylori infections.      No Known Allergies      Current medicines (including changes today)  Current Outpatient Medications   Medication Sig Dispense Refill   • traZODone (DESYREL) 50 MG Tab Take 1 Tab by mouth at bedtime as needed for Sleep for up to 90 days. 30 Tab 2   • atorvastatin (LIPITOR) 40 MG Tab TAKE 1 TABLET EVERY EVENING 90 Tab 2   • lisinopril (PRINIVIL) 20 MG Tab Take 1 Tab by mouth every day. 90 Tab 3     No current facility-administered medications for this visit.      He  has a past medical history of Bladder cancer ([facility]), Cancer (HCC), Closed right ankle fracture, Depression, Generalized anxiety disorder (3/31/2020), Gout, Hyperlipidemia, Hypertension, and Lip injury. He also has no past medical history of Psychosis ([facility]), Self-injurious behavior, or Suicide attempt ([facility]).    ROS   Review of Systems   Constitutional: Negative for chills, fever, malaise/fatigue  "and weight loss.   Cardiovascular: Negative for chest pain and palpitations.   Gastrointestinal: Positive for heartburn and nausea. Negative for abdominal pain, blood in stool, constipation, diarrhea, melena and vomiting.   Musculoskeletal: Positive for joint pain.          Objective:     Physical Exam:  /70   Pulse 61   Temp 36.6 °C (97.9 °F) (Temporal)   Resp 12   Ht 1.829 m (6' 0.01\")   Wt 85.7 kg (189 lb)   SpO2 97%  Body mass index is 25.63 kg/m².   Constitutional: Alert, no distress.  Skin: Warm, dry, good turgor, no rashes in visible areas.  Eye: Equal, round and reactive, conjunctiva clear, lids normal.  ENMT: TM's clear bilaterally, lips without lesions, good dentition, oropharynx clear.  Neck: Trachea midline, no masses, no thyromegaly. No cervical or supraclavicular lymphadenopathy.  Respiratory: Unlabored respiratory effort, lungs clear to auscultation, no wheezes, no rhonchi.  Cardiovascular: Normal S1, S2, no murmur, no edema.  Abdomen: Soft, non-tender, no masses, no hepatosplenomegaly.  Psych: Alert and oriented x3, normal affect and mood.    Evaluated gait, posture, weightbearing status. Examination of Left knee:  -    Inspected/palpated bilaterally for warmth, erythema/discoloration, effusion/swelling, patellar malalignment/apprehension, and tenderness of the medial/lateral joint lines, patella, distal femur, proximal tibia/fibula, quadriceps/patellar tendon, IT band, LCL/MCL, and pes anserine bursa.  -    Assessed passive/active range of motion bilaterally in flexion and extension, noting below for pain or crepitation.  -    Assessed muscle strength bilaterally in flexion and extension, noting below if less than 5/5 or pain. Observed for muscle atrophy.  -    Assessed stability bilaterally at ACL (Lachman, anterior drawer), PCL (posterior drawer), LCL (varus stress), MCL (valgus stress), menisci (Thessaly, Apley, Joe).       All of the above were found to be normal, " except:  Diffuse tenderness along the joint line both medial and lateral aspects of the left knee.  Very mild edema noted in his knee.  Otherwise normal physical exam.    Assessment and Plan:     1. Primary osteoarthritis of left knee  -At this time will treat with corticosteroid injection the knee.  Discussed risks and benefits of procedure, consent was signed.  Discussed that this can be done only every 6 to 12 months.  I also discussed the possibility of discussion of knee replacement with surgeon.  He states that he will think about it.  Reviewed previous chart and no new recent films were done at the knee.  We will secure those at the same time.  -Corticosteroid injection knee was completed (see procedure note)  -Patient will follow-up as needed.  - Consent for Amb Surgery/Procedure  - DX-KNEE 2- LEFT; Future  - triamcinolone acetonide (KENALOG-40) injection 40 mg    2. Dyspepsia  -Given the symptoms of dyspepsia and uncontrolled GERD we will further check for H. pylori at this time with stool antigen.  We will follow-up as needed.  - H.PYLORI STOOL ANTIGEN; Future      Followup: Return if symptoms worsen or fail to improve.         PLEASE NOTE: This dictation was created using voice recognition software. I have made every reasonable attempt to correct obvious errors, but I expect that there are errors of grammar and possibly content that I did not discover before finalizing the note.

## 2020-09-22 ENCOUNTER — NON-PROVIDER VISIT (OUTPATIENT)
Dept: MEDICAL GROUP | Facility: LAB | Age: 70
End: 2020-09-22
Payer: MEDICARE

## 2020-09-22 DIAGNOSIS — Z23 NEED FOR VACCINATION: ICD-10-CM

## 2020-09-22 PROCEDURE — 90662 IIV NO PRSV INCREASED AG IM: CPT | Performed by: FAMILY MEDICINE

## 2020-09-22 PROCEDURE — G0008 ADMIN INFLUENZA VIRUS VAC: HCPCS | Performed by: FAMILY MEDICINE

## 2020-09-22 NOTE — PROGRESS NOTES
"NAMAN Holm is a 69 y.o. male here for a non-provider visit for:   FLU    Reason for immunization: Annual Flu Vaccine  Immunization records indicate need for vaccine: Yes, confirmed with Epic  Minimum interval has been met for this vaccine: Yes  ABN completed: Yes    Order and dose verified by: Dr. Gandhi    VIS Dated  08/15/2019 was given to patient: Yes  All IAC Questionnaire questions were answered \"No.\"    Patient tolerated injection and no adverse effects were observed or reported: Yes    Pt scheduled for next dose in series: No  "

## 2020-11-13 NOTE — TELEPHONE ENCOUNTER
1. Caller Name: Prieto Holm                                       Call Back Number: [phone] (home)         Patient approves a detailed voicemail message: N\A    Pt calling about stomach issues since last Friday. About 3-5 am he gets up and need to use the restroom. He states he gets diarrhea. I offered him and appt. Pt declined. Please advise    Normal physical exam findings.  Breast exam was unremarkable along with Pap smear.  Pap was collected today.  She has been counseled on mammograms, Pap smears, colonoscopies, vaccines, dental/eye health, healthy diet and exercise.  Mental health was addressed today as well.  Patient will check into getting the flu vaccine at her local pharmacy as we do not have them in stock today.

## 2020-12-23 RX ORDER — TRAZODONE HYDROCHLORIDE 50 MG/1
50 TABLET ORAL
Qty: 30 TAB | Refills: 2 | Status: SHIPPED | OUTPATIENT
Start: 2020-12-23 | End: 2021-03-10 | Stop reason: SDUPTHER

## 2021-01-08 ENCOUNTER — HOSPITAL ENCOUNTER (OUTPATIENT)
Dept: RADIOLOGY | Facility: MEDICAL CENTER | Age: 71
End: 2021-01-08
Attending: FAMILY MEDICINE
Payer: MEDICARE

## 2021-01-08 ENCOUNTER — OFFICE VISIT (OUTPATIENT)
Dept: MEDICAL GROUP | Facility: LAB | Age: 71
End: 2021-01-08
Payer: MEDICARE

## 2021-01-08 VITALS
RESPIRATION RATE: 13 BRPM | HEIGHT: 72 IN | SYSTOLIC BLOOD PRESSURE: 144 MMHG | TEMPERATURE: 97.3 F | BODY MASS INDEX: 25.73 KG/M2 | OXYGEN SATURATION: 98 % | HEART RATE: 65 BPM | DIASTOLIC BLOOD PRESSURE: 82 MMHG | WEIGHT: 190 LBS

## 2021-01-08 DIAGNOSIS — K40.90 UNILATERAL INGUINAL HERNIA WITHOUT OBSTRUCTION OR GANGRENE, RECURRENCE NOT SPECIFIED: ICD-10-CM

## 2021-01-08 DIAGNOSIS — I10 ESSENTIAL HYPERTENSION: ICD-10-CM

## 2021-01-08 PROCEDURE — 99214 OFFICE O/P EST MOD 30 MIN: CPT | Performed by: FAMILY MEDICINE

## 2021-01-08 PROCEDURE — 76857 US EXAM PELVIC LIMITED: CPT

## 2021-01-08 ASSESSMENT — FIBROSIS 4 INDEX: FIB4 SCORE: 0.97

## 2021-01-08 NOTE — PROGRESS NOTES
"Subjective:   Prieto Holm is a 70 y.o. male here today for   Chief Complaint   Patient presents with   • Possible Hernia     X couple months, right side,        #Inguinal hernia:  -Patient has had a inguinal mass located on the right side for several months.  Over the last several weeks has noticed that mass has increased in size.  Has started having some intermittent sharp pain in the same area, especially with coughing, sneezing, exercising or other rigorous activity.  He states he is able to push it in but returns right after.  He denies any constant abdominal pain, nausea, vomiting, fevers, chills.    #Hypertension:  -Chronic condition, treating with lisinopril 20 mg daily.  Is compliant medication, denies any side effects.  Is not checking blood pressures at home.  Tinea with normal diet, exercise regiment.  He is asymptomatic at this time.  Pressure today slightly elevated at 144/82.      No Known Allergies      Current medicines (including changes today)  Current Outpatient Medications   Medication Sig Dispense Refill   • traZODone (DESYREL) 50 MG Tab Take 1 Tab by mouth at bedtime as needed for Sleep for up to 90 days. 30 Tab 2   • atorvastatin (LIPITOR) 40 MG Tab TAKE 1 TABLET EVERY EVENING 90 Tab 2   • lisinopril (PRINIVIL) 20 MG Tab Take 1 Tab by mouth every day. 90 Tab 3     No current facility-administered medications for this visit.      He  has a past medical history of Bladder cancer (HCC), Cancer (HCC), Closed right ankle fracture, Depression, Generalized anxiety disorder (3/31/2020), Gout, Hyperlipidemia, Hypertension, and Lip injury. He also has no past medical history of Psychosis ([facility]), Self-injurious behavior, or Suicide attempt ([facility]).    ROS   See above     Objective:     Physical Exam:  /82 (BP Location: Right arm, Patient Position: Sitting, BP Cuff Size: Adult)   Pulse 65   Temp 36.3 °C (97.3 °F) (Temporal)   Resp 13   Ht 1.829 m (6' 0.01\")   Wt 86.2 kg (190 lb)   SpO2 " "98%  Body mass index is 25.76 kg/m².   Constitutional: Alert, no distress.  Skin: Warm, dry, good turgor, no rashes in visible areas.  Respiratory: Unlabored respiratory effort, lungs clear to auscultation, no wheezes, no rhonchi.  Cardiovascular: Normal S1, S2, no murmur, no edema.  Abdomen: Soft, non-tender, no masses, no hepatosplenomegaly.  : Deferred  Psych: Alert and oriented x3, normal affect and mood.    Assessment and Plan:     1. Unilateral inguinal hernia without obstruction or gangrene, recurrence not specified  -New problem.  At this time signs and symptoms are consistent with possible renal hernia.  We will get ultrasound at this time for further evaluation, referral to general surgeon based on results.  -Strict return precautions were given, patient will follow-up as needed.  - US-INGUINAL HERNIA; Future    2. Essential hypertension  -Status: Unstable.  Blood pressure is elevated today.  Patient states he feels like he is \"amped up\" and that is the reason for high blood pressure.  At this time I do not believe we need to make any become adjustments to medications, he will begin to be checking blood pressure regularly.  Patient was instructed that if blood pressure is above 140/90 he will follow-up with me for medication adjustment.  Discussed the importance of daily aerobic activity, low-salt diet.      Followup: Return in about 6 months (around 7/8/2021).         PLEASE NOTE: This dictation was created using voice recognition software. I have made every reasonable attempt to correct obvious errors, but I expect that there are errors of grammar and possibly content that I did not discover before finalizing the note.  "

## 2021-01-11 DIAGNOSIS — K40.90 UNILATERAL INGUINAL HERNIA WITHOUT OBSTRUCTION OR GANGRENE, RECURRENCE NOT SPECIFIED: ICD-10-CM

## 2021-03-03 DIAGNOSIS — Z23 NEED FOR VACCINATION: ICD-10-CM

## 2021-03-10 ENCOUNTER — PATIENT MESSAGE (OUTPATIENT)
Dept: BEHAVIORAL HEALTH | Facility: CLINIC | Age: 71
End: 2021-03-10

## 2021-03-10 ENCOUNTER — PATIENT MESSAGE (OUTPATIENT)
Dept: MEDICAL GROUP | Facility: LAB | Age: 71
End: 2021-03-10

## 2021-03-10 RX ORDER — TRAZODONE HYDROCHLORIDE 50 MG/1
50 TABLET ORAL
Qty: 90 TABLET | Refills: 0 | Status: SHIPPED | OUTPATIENT
Start: 2021-03-10 | End: 2021-06-08

## 2021-03-10 RX ORDER — TRAZODONE HYDROCHLORIDE 50 MG/1
50 TABLET ORAL
Qty: 30 TABLET | Refills: 2 | Status: SHIPPED | OUTPATIENT
Start: 2021-03-10 | End: 2021-07-08 | Stop reason: SDUPTHER

## 2021-03-10 NOTE — TELEPHONE ENCOUNTER
----- Message from Prieto Holm sent at 3/10/2021  9:56 AM PST -----  Regarding: Prescription Question  Contact: [phone]  Hi Dr Ramsay,just sent Dr Martinez a message about needing a re-fill for Trazadone,[employer] has been trying to contact the Dr so he could  give the  OK for my re-fill,,,[facility], Trazadone RX# [id number], at [phone],could you please follow up on this re-fill,, running low.  Thank You Alfredo

## 2021-03-10 NOTE — PATIENT COMMUNICATION
Received request via: Patient    Was the patient seen in the last year in this department? Yes  1/8/2021  Does the patient have an active prescription (recently filled or refills available) for medication(s) requested? No

## 2021-04-03 DIAGNOSIS — I10 ESSENTIAL HYPERTENSION: ICD-10-CM

## 2021-04-05 RX ORDER — LISINOPRIL 20 MG/1
TABLET ORAL
Qty: 90 TABLET | Refills: 3 | Status: SHIPPED | OUTPATIENT
Start: 2021-04-05 | End: 2021-07-08 | Stop reason: SDUPTHER

## 2021-04-05 NOTE — TELEPHONE ENCOUNTER
Received request via: Pharmacy    Was the patient seen in the last year in this department? Yes    Does the patient have an active prescription (recently filled or refills available) for medication(s) requested? No     LISINOPRIL TAB 20MG

## 2021-05-07 ENCOUNTER — OFFICE VISIT (OUTPATIENT)
Dept: MEDICAL GROUP | Facility: LAB | Age: 71
End: 2021-05-07
Payer: MEDICARE

## 2021-05-07 VITALS
TEMPERATURE: 97.8 F | SYSTOLIC BLOOD PRESSURE: 162 MMHG | BODY MASS INDEX: 25.73 KG/M2 | HEIGHT: 72 IN | HEART RATE: 65 BPM | WEIGHT: 190 LBS | RESPIRATION RATE: 14 BRPM | DIASTOLIC BLOOD PRESSURE: 84 MMHG | OXYGEN SATURATION: 100 %

## 2021-05-07 DIAGNOSIS — F32.1 CURRENT MODERATE EPISODE OF MAJOR DEPRESSIVE DISORDER, UNSPECIFIED WHETHER RECURRENT (HCC): ICD-10-CM

## 2021-05-07 DIAGNOSIS — M17.12 PRIMARY OSTEOARTHRITIS OF LEFT KNEE: ICD-10-CM

## 2021-05-07 PROCEDURE — 99214 OFFICE O/P EST MOD 30 MIN: CPT | Performed by: FAMILY MEDICINE

## 2021-05-07 RX ORDER — BUPROPION HYDROCHLORIDE 150 MG/1
150 TABLET ORAL EVERY MORNING
Qty: 30 TABLET | Refills: 2 | Status: SHIPPED | OUTPATIENT
Start: 2021-05-07 | End: 2021-06-06

## 2021-05-07 RX ORDER — HYDROCODONE BITARTRATE AND ACETAMINOPHEN 5; 325 MG/1; MG/1
TABLET ORAL
COMMUNITY
Start: 2021-03-08 | End: 2021-07-08

## 2021-05-07 ASSESSMENT — ENCOUNTER SYMPTOMS
DEPRESSION: 1
SENSORY CHANGE: 0
TINGLING: 0
WEAKNESS: 0
TREMORS: 0
CHILLS: 0
WHEEZING: 0
INSOMNIA: 0
FEVER: 0
PALPITATIONS: 0
BLURRED VISION: 0
SHORTNESS OF BREATH: 0

## 2021-05-07 ASSESSMENT — ANXIETY QUESTIONNAIRES
2. NOT BEING ABLE TO STOP OR CONTROL WORRYING: MORE THAN HALF THE DAYS
4. TROUBLE RELAXING: SEVERAL DAYS
1. FEELING NERVOUS, ANXIOUS, OR ON EDGE: MORE THAN HALF THE DAYS
5. BEING SO RESTLESS THAT IT IS HARD TO SIT STILL: MORE THAN HALF THE DAYS
7. FEELING AFRAID AS IF SOMETHING AWFUL MIGHT HAPPEN: NOT AT ALL
6. BECOMING EASILY ANNOYED OR IRRITABLE: MORE THAN HALF THE DAYS
GAD7 TOTAL SCORE: 11
3. WORRYING TOO MUCH ABOUT DIFFERENT THINGS: MORE THAN HALF THE DAYS

## 2021-05-07 ASSESSMENT — PATIENT HEALTH QUESTIONNAIRE - PHQ9
CLINICAL INTERPRETATION OF PHQ2 SCORE: 2
SUM OF ALL RESPONSES TO PHQ QUESTIONS 1-9: 13
5. POOR APPETITE OR OVEREATING: 2 - MORE THAN HALF THE DAYS

## 2021-05-07 ASSESSMENT — FIBROSIS 4 INDEX: FIB4 SCORE: 0.97

## 2021-05-08 NOTE — PROGRESS NOTES
Subjective:   Prieto Holm is a 70 y.o. male here today for   Chief Complaint   Patient presents with   • Depression     #Osteoarthritis of left knee:  -This been a chronic longstanding condition patient is been dealing with the last several years.  He has undergone physical therapy, corticosteroid injection in left knee approximately 2 years ago which significant with pain.  Currently he states that he is having sharp, exquisite, almost constant pain is located on the lateral aspect of his left knee.  He is also noticed some swelling has been occurring over the last few days.  He denies any acute trauma, numbness, tingling, weakness in lower extremity.  He is here today to discuss other possible treatment measures including possible corticosteroid injection.    #Depression:  -Patient has undergone several health problems lately including difficulty with his left knee, hernia repair complications regarding this procedure.  All this is caused a significant shift in his normal daily function.  He is normally very outgoing, likes to be outside and doing things; however, recently the pain of all of these problems has caused him to have to slow down a change of way he normally completes his daily activities.  He states that it has been very very difficult.  He is not a person who likes to sit around but is always outgoing.  This has had to change that anything he struggled with having to sit and recuperate/heal.  It is caused significant symptoms of depression, difficulty concentrating, lack of motivation, dealings of guilt, passive death wishes.  He states that does have a history of depression previously, has bruise been treated with Paxil and Lexapro both of which were discontinued given significant side effects.  At this time he denies any real suicidal/homicidal ideations here discussed other possible treatment measures.      No Known Allergies      Current medicines (including changes today)  Current  Outpatient Medications   Medication Sig Dispense Refill   • buPROPion (WELLBUTRIN XL) 150 MG XL tablet Take 1 tablet by mouth every morning for 30 days. 30 tablet 2   • HYDROcodone-acetaminophen (NORCO) 5-325 MG Tab per tablet      • lisinopril (PRINIVIL) 20 MG Tab TAKE 1 TABLET DAILY 90 tablet 3   • traZODone (DESYREL) 50 MG Tab Take 1 tablet by mouth at bedtime as needed for Sleep for up to 90 days. 30 tablet 2   • traZODone (DESYREL) 50 MG Tab Take 1 tablet by mouth at bedtime as needed for Sleep for up to 90 days. 90 tablet 0   • atorvastatin (LIPITOR) 40 MG Tab TAKE 1 TABLET EVERY EVENING 90 Tab 2     No current facility-administered medications for this visit.     He  has a past medical history of Bladder cancer ([facility]), Cancer ([facility]), Closed right ankle fracture, Depression, Generalized anxiety disorder (3/31/2020), Gout, Hyperlipidemia, Hypertension, and Lip injury. He also has no past medical history of Psychosis ([facility]), Self-injurious behavior, or Suicide attempt ([facility]).    ROS   Review of Systems   Constitutional: Negative for chills and fever.   HENT: Negative for hearing loss.    Eyes: Negative for blurred vision.   Respiratory: Negative for shortness of breath and wheezing.    Cardiovascular: Negative for chest pain and palpitations.   Musculoskeletal: Positive for joint pain.   Neurological: Negative for tingling, tremors, sensory change and weakness.   Psychiatric/Behavioral: Positive for depression. Negative for suicidal ideas. The patient does not have insomnia.       Objective:     Physical Exam:  BP (!) 162/84 (BP Location: Left arm, Patient Position: Sitting, BP Cuff Size: Adult)   Pulse 65   Temp 36.6 °C (97.8 °F)   Resp 14   Ht 1.829 m (6')   Wt 86.2 kg (190 lb)   SpO2 100%  Body mass index is 25.77 kg/m².   Evaluated gait, posture, weightbearing status. Examination of Left knee:  -    Inspected/palpated bilaterally for warmth, erythema/discoloration, effusion/swelling, patellar  malalignment/apprehension, and tenderness of the medial/lateral joint lines, patella, distal femur, proximal tibia/fibula, quadriceps/patellar tendon, IT band, LCL/MCL, and pes anserine bursa.  -    Assessed passive/active range of motion bilaterally in flexion and extension, noting below for pain or crepitation.  -    Assessed muscle strength bilaterally in flexion and extension, noting below if less than 5/5 or pain. Observed for muscle atrophy.  -    Assessed stability bilaterally at ACL (Lachman, anterior drawer), PCL (posterior drawer), LCL (varus stress), MCL (valgus stress), menisci (Thessaly, Apley, Joe).       All of the above were found to be normal, except:  Moderate edema noted on left knee.  Tenderness to palpation along the joint line, lateral side more than medial side, of the left knee.  Otherwise normal exam.    Depression Screening    Little interest or pleasure in doing things?  1 - several days   Feeling down, depressed , or hopeless? 1 - several days   Trouble falling or staying asleep, or sleeping too much?  2 - more than half the days   Feeling tired or having little energy?  2 - more than half the days   Poor appetite or overeating?  2 - more than half the days   Feeling bad about yourself - or that you are a failure or have let yourself or your family down? 2 - more than half the days   Trouble concentrating on things, such as reading the newspaper or watching television? 2 - more than half the days   Moving or speaking so slowly that other people could have noticed.  Or the opposite - being so fidgety or restless that you have been moving around a lot more than usual?  0 - not at all   Thoughts that you would be better off dead, or of hurting yourself?  1 - several days   Patient Health Questionnaire Score: 13     If depressive symptoms identified deferred to follow up visit unless specifically addressed in assesment and plan.    Interpretation of PHQ-9 Total Score   Score Severity    1-4 No Depression   5-9 Mild Depression   10-14 Moderate Depression   15-19 Moderately Severe Depression   20-27 Severe Depression      Assessment and Plan:     1. Primary osteoarthritis of left knee  -Patient will follow up next week where we will complete arthrocentesis plus corticosteroid injection of left knee.  -In the meantime encourage patient get x-rays to see any other progress and discussed possible referral to orthopedics.  - DX-KNEE 3 VIEWS LEFT; Future    2. Current moderate episode of major depressive disorder, unspecified whether recurrent (HCC)  -New problem  -PHQ-9 score: 13  -Medication: Weelbutrin as noted in medication list  -Follow up in 3-4 weeks for recheck for new starts. Follow every 6 months for patients that are long term stable. Sooner if symptoms worsen. Patient will call for any questions.  -Suicide prevention plan in place.  Patient verbally contracts with me today not to harm self and knows to go to the Emergency Room for acute destabilization.  -Any medications noted for this encounter were discussed with the patient including the use, side effects, potential for adverse reactions. The patient is instructed to call or return to the clinic for serious side effects or adverse reactions to the medication.  -The diagnosis, pathogenesis, and course of the problem and anticipatory guidance ware discussed with the patient at the time of the visit. The patient will call for further problems and concerns as they arise.  -The patient will follow up on an as needed basis as the problem evolves.  - buPROPion (WELLBUTRIN XL) 150 MG XL tablet; Take 1 tablet by mouth every morning for 30 days.  Dispense: 30 tablet; Refill: 2      Followup: Return if symptoms worsen or fail to improve.         PLEASE NOTE: This dictation was created using voice recognition software. I have made every reasonable attempt to correct obvious errors, but I expect that there are errors of grammar and possibly content  that I did not discover before finalizing the note.

## 2021-05-10 ENCOUNTER — OFFICE VISIT (OUTPATIENT)
Dept: MEDICAL GROUP | Facility: LAB | Age: 71
End: 2021-05-10
Payer: MEDICARE

## 2021-05-10 VITALS
HEIGHT: 72 IN | WEIGHT: 190 LBS | OXYGEN SATURATION: 97 % | DIASTOLIC BLOOD PRESSURE: 76 MMHG | SYSTOLIC BLOOD PRESSURE: 132 MMHG | TEMPERATURE: 98.5 F | RESPIRATION RATE: 13 BRPM | HEART RATE: 75 BPM | BODY MASS INDEX: 25.73 KG/M2

## 2021-05-10 DIAGNOSIS — M17.12 PRIMARY OSTEOARTHRITIS OF LEFT KNEE: ICD-10-CM

## 2021-05-10 PROCEDURE — 20610 DRAIN/INJ JOINT/BURSA W/O US: CPT | Performed by: FAMILY MEDICINE

## 2021-05-10 RX ORDER — TRIAMCINOLONE ACETONIDE 40 MG/ML
40 INJECTION, SUSPENSION INTRA-ARTICULAR; INTRAMUSCULAR ONCE
Status: COMPLETED | OUTPATIENT
Start: 2021-05-10 | End: 2021-05-10

## 2021-05-10 RX ADMIN — TRIAMCINOLONE ACETONIDE 40 MG: 40 INJECTION, SUSPENSION INTRA-ARTICULAR; INTRAMUSCULAR at 11:13

## 2021-05-10 ASSESSMENT — ENCOUNTER SYMPTOMS
FEVER: 0
TINGLING: 0
SENSORY CHANGE: 0
CHILLS: 0
WEAKNESS: 0

## 2021-05-10 ASSESSMENT — FIBROSIS 4 INDEX: FIB4 SCORE: 0.97

## 2021-05-10 NOTE — PROCEDURES
Joint Inj - LG: knee, L knee on 5/10/2021 11:12 AM  Indications: pain  Details: 22 G needle, superolateral approach  Medications: (Triamcinolone 40 mg, 2% Xylocaine with epinephrine for mL)  Outcome: tolerated well, no immediate complications  Procedure, treatment alternatives, risks and benefits explained, specific risks discussed. Consent was given by the patient. Immediately prior to procedure a time out was called to verify the correct patient, procedure, equipment, support staff and site/side marked as required. Patient was prepped and draped in the usual sterile fashion.

## 2021-05-10 NOTE — PROGRESS NOTES
Subjective:   Prieto Holm is a 70 y.o. male here today for   Chief Complaint   Patient presents with   • Injections     Left knee        #Osteoarthritis:  -Patient here for follow-up for osteoarthritis.  Over the weekend he has been working on compression, leg elevation.  He states that the leg has improved.  He is not having as much pain as he did at his previous appointment on Friday.  The swelling has gone down noticeably as well.  He denies any swelling, erythema, tenderness to touch, heat to touch.  Here today for centesis and injection.      No Known Allergies      Current medicines (including changes today)  Current Outpatient Medications   Medication Sig Dispense Refill   • HYDROcodone-acetaminophen (NORCO) 5-325 MG Tab per tablet      • buPROPion (WELLBUTRIN XL) 150 MG XL tablet Take 1 tablet by mouth every morning for 30 days. 30 tablet 2   • lisinopril (PRINIVIL) 20 MG Tab TAKE 1 TABLET DAILY 90 tablet 3   • traZODone (DESYREL) 50 MG Tab Take 1 tablet by mouth at bedtime as needed for Sleep for up to 90 days. 30 tablet 2   • traZODone (DESYREL) 50 MG Tab Take 1 tablet by mouth at bedtime as needed for Sleep for up to 90 days. 90 tablet 0   • atorvastatin (LIPITOR) 40 MG Tab TAKE 1 TABLET EVERY EVENING 90 Tab 2     Current Facility-Administered Medications   Medication Dose Route Frequency Provider Last Rate Last Admin   • triamcinolone acetonide (KENALOG-40) injection 40 mg  40 mg Intra-articular Once Eliseo Ramsay M.D.         He  has a past medical history of Bladder cancer ([facility]), Cancer ([facility]), Closed right ankle fracture, Depression, Generalized anxiety disorder (3/31/2020), Gout, Hyperlipidemia, Hypertension, and Lip injury. He also has no past medical history of Psychosis ([facility]), Self-injurious behavior, or Suicide attempt ([facility]).    ROS   Review of Systems   Constitutional: Negative for chills and fever.   Musculoskeletal: Positive for joint pain.   Neurological: Negative for  "tingling, sensory change and weakness.      Objective:     Physical Exam:  /76   Pulse 75   Temp 36.9 °C (98.5 °F) (Temporal)   Resp 13   Ht 1.829 m (6' 0.01\")   Wt 86.2 kg (190 lb)   SpO2 97%  Body mass index is 25.76 kg/m².   Constitutional: Alert, no distress.  Skin: Warm, dry, good turgor, no rashes in visible areas.  Eye: Equal, round and reactive, conjunctiva clear, lids normal.  ENMT: TM's clear bilaterally, lips without lesions, good dentition, oropharynx clear.  Neck: Trachea midline, no masses, no thyromegaly. No cervical or supraclavicular lymphadenopathy.  Respiratory: Unlabored respiratory effort, lungs clear to auscultation, no wheezes, no rhonchi.  Cardiovascular: Normal S1, S2, no murmur, no edema.  Abdomen: Soft, non-tender, no masses, no hepatosplenomegaly.  Psych: Alert and oriented x3, normal affect and mood.    Assessment and Plan:     1. Primary osteoarthritis of left knee  -On examination the edema/swelling that was seen on Friday has resolved.  At this time we will go forward without arthrocentesis but we will proceed with corticosteroid injection at this time (see procedure note below).  - Consent for all Surgical, Special Diagnostic or Therapeutic Procedures  - triamcinolone acetonide (KENALOG-40) injection 40 mg      Followup: No follow-ups on file.         PLEASE NOTE: This dictation was created using voice recognition software. I have made every reasonable attempt to correct obvious errors, but I expect that there are errors of grammar and possibly content that I did not discover before finalizing the note.  "

## 2021-05-24 ENCOUNTER — PATIENT MESSAGE (OUTPATIENT)
Dept: MEDICAL GROUP | Facility: LAB | Age: 71
End: 2021-05-24

## 2021-05-24 DIAGNOSIS — E78.2 MIXED HYPERLIPIDEMIA: ICD-10-CM

## 2021-05-24 RX ORDER — ATORVASTATIN CALCIUM 40 MG/1
TABLET, FILM COATED ORAL
Qty: 90 TABLET | Refills: 2 | Status: SHIPPED | OUTPATIENT
Start: 2021-05-24 | End: 2022-03-14

## 2021-05-24 NOTE — PATIENT COMMUNICATION
Received request via: Patient    Was the patient seen in the last year in this department? Yes  5/10/2021  Does the patient have an active prescription (recently filled or refills available) for medication(s) requested? No

## 2021-05-24 NOTE — TELEPHONE ENCOUNTER
----- Message from Prieto Holm sent at 5/24/2021 12:38 PM PDT -----  Regarding: Prescription Question  Contact: [phone]  Good Morning,,,Perlita sent me a e-mail,,I have no more refills for The Atorvastatin 40 mg,,could you please send them a refill request.   Thanks

## 2021-06-03 NOTE — BH THERAPY
Group Therapy Checklist  Attendance: Attended  Attendance Duration (min):  (90 min.)  Number of Participants: 10  Program/Group: Intensive Outpatient Program  Topics Covered: Weekend planning  Participation: Active verbal participation, Attentive, Supportive to other group members (Pt expressed hei changing moods day to day and that today was a low 3 day for him emotionally. )  Affect/Mood Range: Normal range  Affect/Mood Display: Congruent w/content  Cognition: Oriented, Alert  Evidence of Imminent Suicide Risk: No  Evidence of imminent homicide risk: No  Therapeutic Interventions: Emotion clarification, Cognitive clarification  Progress Toward Treatment Goal: Moderate improvement   Statement Selected

## 2021-06-07 ENCOUNTER — PATIENT MESSAGE (OUTPATIENT)
Dept: MEDICAL GROUP | Facility: LAB | Age: 71
End: 2021-06-07

## 2021-06-07 DIAGNOSIS — F33.1 DEPRESSION, MAJOR, RECURRENT, MODERATE (HCC): ICD-10-CM

## 2021-06-09 RX ORDER — VENLAFAXINE HYDROCHLORIDE 37.5 MG/1
37.5 CAPSULE, EXTENDED RELEASE ORAL DAILY
Qty: 30 CAPSULE | Refills: 3 | Status: SHIPPED | OUTPATIENT
Start: 2021-06-09 | End: 2021-07-08

## 2021-06-09 NOTE — TELEPHONE ENCOUNTER
Please have him discontinue the Wellbutrin. Will start a new medication for treatment of depression called Effexor. Please start taking every morning. Have him come in for an appointment in 1 month for medication check. Thanks.

## 2021-06-09 NOTE — TELEPHONE ENCOUNTER
It sounds like we need to increase the Wellbutrin to 300mg. If he is not exercising any side effects from the meds (insomnia, difficulty eating, stomach pains, diarrhea) then I'll increase the dosage.

## 2021-07-08 ENCOUNTER — OFFICE VISIT (OUTPATIENT)
Dept: MEDICAL GROUP | Facility: LAB | Age: 71
End: 2021-07-08
Payer: MEDICARE

## 2021-07-08 VITALS
HEIGHT: 72 IN | WEIGHT: 184 LBS | RESPIRATION RATE: 13 BRPM | BODY MASS INDEX: 24.92 KG/M2 | TEMPERATURE: 97.6 F | HEART RATE: 61 BPM | SYSTOLIC BLOOD PRESSURE: 122 MMHG | DIASTOLIC BLOOD PRESSURE: 78 MMHG | OXYGEN SATURATION: 95 %

## 2021-07-08 DIAGNOSIS — R73.09 ELEVATED HEMOGLOBIN A1C: ICD-10-CM

## 2021-07-08 DIAGNOSIS — Z12.5 ENCOUNTER FOR SCREENING FOR MALIGNANT NEOPLASM OF PROSTATE: ICD-10-CM

## 2021-07-08 DIAGNOSIS — Z11.59 NEED FOR HEPATITIS C SCREENING TEST: ICD-10-CM

## 2021-07-08 DIAGNOSIS — M17.12 PRIMARY OSTEOARTHRITIS OF LEFT KNEE: ICD-10-CM

## 2021-07-08 DIAGNOSIS — I10 ESSENTIAL HYPERTENSION: ICD-10-CM

## 2021-07-08 DIAGNOSIS — Z13.6 SCREENING FOR CARDIOVASCULAR CONDITION: ICD-10-CM

## 2021-07-08 DIAGNOSIS — F33.1 DEPRESSION, MAJOR, RECURRENT, MODERATE (HCC): ICD-10-CM

## 2021-07-08 PROCEDURE — 99214 OFFICE O/P EST MOD 30 MIN: CPT | Performed by: FAMILY MEDICINE

## 2021-07-08 RX ORDER — TRAZODONE HYDROCHLORIDE 50 MG/1
50 TABLET ORAL
Qty: 30 TABLET | Refills: 2
Start: 2021-07-08 | End: 2021-09-21

## 2021-07-08 RX ORDER — LISINOPRIL 20 MG/1
TABLET ORAL
Qty: 90 TABLET | Refills: 3
Start: 2021-07-08 | End: 2022-03-14

## 2021-07-08 ASSESSMENT — ENCOUNTER SYMPTOMS
DIARRHEA: 0
WHEEZING: 0
VOMITING: 0
FEVER: 0
HEADACHES: 0
PALPITATIONS: 0
NAUSEA: 0
DIZZINESS: 0
SHORTNESS OF BREATH: 0
CHILLS: 0
ABDOMINAL PAIN: 0
DEPRESSION: 0
BLURRED VISION: 0
NERVOUS/ANXIOUS: 0

## 2021-07-08 ASSESSMENT — FIBROSIS 4 INDEX: FIB4 SCORE: 0.97

## 2021-07-27 ENCOUNTER — HOSPITAL ENCOUNTER (OUTPATIENT)
Dept: LAB | Facility: MEDICAL CENTER | Age: 71
End: 2021-07-27
Attending: FAMILY MEDICINE
Payer: MEDICARE

## 2021-07-27 DIAGNOSIS — Z11.59 NEED FOR HEPATITIS C SCREENING TEST: ICD-10-CM

## 2021-07-27 DIAGNOSIS — F33.1 DEPRESSION, MAJOR, RECURRENT, MODERATE (HCC): ICD-10-CM

## 2021-07-27 DIAGNOSIS — Z13.6 SCREENING FOR CARDIOVASCULAR CONDITION: ICD-10-CM

## 2021-07-27 DIAGNOSIS — R73.09 ELEVATED HEMOGLOBIN A1C: ICD-10-CM

## 2021-07-27 DIAGNOSIS — I10 ESSENTIAL HYPERTENSION: ICD-10-CM

## 2021-07-27 DIAGNOSIS — Z12.5 ENCOUNTER FOR SCREENING FOR MALIGNANT NEOPLASM OF PROSTATE: ICD-10-CM

## 2021-07-27 LAB
ERYTHROCYTE [DISTWIDTH] IN BLOOD BY AUTOMATED COUNT: 50.6 FL (ref 35.9–50)
EST. AVERAGE GLUCOSE BLD GHB EST-MCNC: 111 MG/DL
HBA1C MFR BLD: 5.5 % (ref 4–5.6)
HCT VFR BLD AUTO: 46.3 % (ref 42–52)
HGB BLD-MCNC: 15 G/DL (ref 14–18)
MCH RBC QN AUTO: 31.5 PG (ref 27–33)
MCHC RBC AUTO-ENTMCNC: 32.4 G/DL (ref 33.7–35.3)
MCV RBC AUTO: 97.3 FL (ref 81.4–97.8)
PLATELET # BLD AUTO: 198 K/UL (ref 164–446)
PMV BLD AUTO: 11.9 FL (ref 9–12.9)
RBC # BLD AUTO: 4.76 M/UL (ref 4.7–6.1)
WBC # BLD AUTO: 6.4 K/UL (ref 4.8–10.8)

## 2021-07-27 PROCEDURE — 85027 COMPLETE CBC AUTOMATED: CPT

## 2021-07-27 PROCEDURE — 84443 ASSAY THYROID STIM HORMONE: CPT

## 2021-07-27 PROCEDURE — 80061 LIPID PANEL: CPT

## 2021-07-27 PROCEDURE — 36415 COLL VENOUS BLD VENIPUNCTURE: CPT

## 2021-07-27 PROCEDURE — 80053 COMPREHEN METABOLIC PANEL: CPT

## 2021-07-27 PROCEDURE — 83036 HEMOGLOBIN GLYCOSYLATED A1C: CPT | Mod: GA

## 2021-07-27 PROCEDURE — 84153 ASSAY OF PSA TOTAL: CPT | Mod: GA

## 2021-07-27 PROCEDURE — 86803 HEPATITIS C AB TEST: CPT

## 2021-07-28 LAB
ALBUMIN SERPL BCP-MCNC: 4.1 G/DL (ref 3.2–4.9)
ALBUMIN/GLOB SERPL: 2.3 G/DL
ALP SERPL-CCNC: 73 U/L (ref 30–99)
ALT SERPL-CCNC: 16 U/L (ref 2–50)
ANION GAP SERPL CALC-SCNC: 13 MMOL/L (ref 7–16)
AST SERPL-CCNC: 20 U/L (ref 12–45)
BILIRUB SERPL-MCNC: 0.6 MG/DL (ref 0.1–1.5)
BUN SERPL-MCNC: 13 MG/DL (ref 8–22)
CALCIUM SERPL-MCNC: 9.7 MG/DL (ref 8.5–10.5)
CHLORIDE SERPL-SCNC: 107 MMOL/L (ref 96–112)
CHOLEST SERPL-MCNC: 175 MG/DL (ref 100–199)
CO2 SERPL-SCNC: 22 MMOL/L (ref 20–33)
CREAT SERPL-MCNC: 0.91 MG/DL (ref 0.5–1.4)
FASTING STATUS PATIENT QL REPORTED: NORMAL
GLOBULIN SER CALC-MCNC: 1.8 G/DL (ref 1.9–3.5)
GLUCOSE SERPL-MCNC: 106 MG/DL (ref 65–99)
HCV AB SER QL: NORMAL
HDLC SERPL-MCNC: 54 MG/DL
LDLC SERPL CALC-MCNC: 97 MG/DL
POTASSIUM SERPL-SCNC: 4.8 MMOL/L (ref 3.6–5.5)
PROT SERPL-MCNC: 5.9 G/DL (ref 6–8.2)
PSA SERPL-MCNC: 1.41 NG/ML (ref 0–4)
SODIUM SERPL-SCNC: 142 MMOL/L (ref 135–145)
TRIGL SERPL-MCNC: 119 MG/DL (ref 0–149)
TSH SERPL DL<=0.005 MIU/L-ACNC: 2.21 UIU/ML (ref 0.38–5.33)

## 2021-08-05 ENCOUNTER — HOSPITAL ENCOUNTER (OUTPATIENT)
Dept: RADIOLOGY | Facility: MEDICAL CENTER | Age: 71
End: 2021-08-05
Attending: PHYSICIAN ASSISTANT
Payer: MEDICARE

## 2021-08-05 DIAGNOSIS — R10.31 RIGHT INGUINAL PAIN: ICD-10-CM

## 2021-08-05 PROCEDURE — 76870 US EXAM SCROTUM: CPT

## 2021-09-21 DIAGNOSIS — F33.1 DEPRESSION, MAJOR, RECURRENT, MODERATE (HCC): ICD-10-CM

## 2021-09-21 RX ORDER — TRAZODONE HYDROCHLORIDE 50 MG/1
TABLET ORAL
Qty: 90 TABLET | Refills: 0 | Status: SHIPPED | OUTPATIENT
Start: 2021-09-21 | End: 2022-08-08 | Stop reason: SDUPTHER

## 2021-10-19 ENCOUNTER — NON-PROVIDER VISIT (OUTPATIENT)
Dept: MEDICAL GROUP | Facility: LAB | Age: 71
End: 2021-10-19
Payer: MEDICARE

## 2021-10-19 DIAGNOSIS — Z23 NEED FOR VACCINATION: ICD-10-CM

## 2021-10-19 PROCEDURE — G0008 ADMIN INFLUENZA VIRUS VAC: HCPCS | Performed by: FAMILY MEDICINE

## 2021-10-19 PROCEDURE — 90662 IIV NO PRSV INCREASED AG IM: CPT | Performed by: FAMILY MEDICINE

## 2021-11-22 ENCOUNTER — OFFICE VISIT (OUTPATIENT)
Dept: MEDICAL GROUP | Facility: LAB | Age: 71
End: 2021-11-22
Payer: MEDICARE

## 2021-11-22 VITALS
OXYGEN SATURATION: 96 % | SYSTOLIC BLOOD PRESSURE: 126 MMHG | WEIGHT: 191 LBS | BODY MASS INDEX: 25.87 KG/M2 | TEMPERATURE: 98.6 F | DIASTOLIC BLOOD PRESSURE: 78 MMHG | HEART RATE: 86 BPM | RESPIRATION RATE: 15 BRPM | HEIGHT: 72 IN

## 2021-11-22 DIAGNOSIS — L72.0 EPIDERMAL INCLUSION CYST: ICD-10-CM

## 2021-11-22 DIAGNOSIS — R10.13 POSTPRANDIAL EPIGASTRIC PAIN: ICD-10-CM

## 2021-11-22 PROBLEM — I86.1 VARICOCELE: Status: ACTIVE | Noted: 2021-11-22

## 2021-11-22 PROBLEM — N43.3 HYDROCELE: Status: ACTIVE | Noted: 2021-11-22

## 2021-11-22 PROCEDURE — 99214 OFFICE O/P EST MOD 30 MIN: CPT | Performed by: FAMILY MEDICINE

## 2021-11-22 RX ORDER — SUCRALFATE 1 G/1
1 TABLET ORAL
Qty: 120 TABLET | Refills: 3 | Status: SHIPPED | OUTPATIENT
Start: 2021-11-22 | End: 2022-12-01

## 2021-11-22 RX ORDER — OMEPRAZOLE 40 MG/1
40 CAPSULE, DELAYED RELEASE ORAL DAILY
Qty: 30 CAPSULE | Refills: 6 | Status: SHIPPED | OUTPATIENT
Start: 2021-11-22 | End: 2021-12-22

## 2021-11-22 ASSESSMENT — ENCOUNTER SYMPTOMS
HEARTBURN: 1
VOMITING: 1
FEVER: 0
WHEEZING: 0
NAUSEA: 1
SHORTNESS OF BREATH: 0
DIARRHEA: 1
WEIGHT LOSS: 0
BLOOD IN STOOL: 0
CONSTIPATION: 0
ABDOMINAL PAIN: 1
BLURRED VISION: 0
CHILLS: 0
PALPITATIONS: 0

## 2021-11-22 ASSESSMENT — FIBROSIS 4 INDEX: FIB4 SCORE: 1.77

## 2021-11-22 NOTE — PROGRESS NOTES
"Subjective:   Prieto Holm is a 71 y.o. male here today for   Chief Complaint   Patient presents with   • GI Problem     Runs, bloated, gassy, acid reflux, nausea, vomited. after one beer upset stomach, eating anything upset stomach    • Bursitis     Left elbow, few years, recently    • Requesting Labs     Check thyroid, mom just had thyroid removed, Check vitamins levels.        #Abdominal pain   -approx 1 month ago patient started having symptoms of diarrhea, soft stool, nausea, occasional vomiting, bloating, cramping    -Symptoms are intermittent, occurring on and off every 1-2 days.   -With symptoms he is also having epigastric pain is a \"buring\" sensation.   -Patient states that symptoms do worsen with certain foods including alcohol.  -Denies any blood in stool or black dark tarry looking stools.  -Patient states his diet is mostly \"meat and potatoes\".  Has never had these symptoms before.  Here today for further evaluation and treatment.    #Cyst:  -Patient states that he has been having a large, swollen mass on his left elbow.  He states it has been there for \"a few years\"; however, recently patient states that he has developed an overlying \"bump\" on top of normal mass which is caused significant mount of pain.  He states that he had used in needle clean and alcohol to goyo it in.  He states that he got out no motor medications material but continues to have some pain.  He denies any increased redness, heat to touch, numbness, tingling, weakness in upper extremities.    No Known Allergies      Current medicines (including changes today)  Current Outpatient Medications   Medication Sig Dispense Refill   • traZODone (DESYREL) 50 MG Tab TAKE 1 TABLET AT BEDTIME ASNEEDED FOR SLEEP FOR UP TO 90 DAYS 90 Tablet 0   • lisinopril (PRINIVIL) 20 MG Tab TAKE 1 TABLET DAILY 90 tablet 3   • atorvastatin (LIPITOR) 40 MG Tab TAKE 1 TABLET EVERY EVENING 90 tablet 2     No current facility-administered medications " "for this visit.     He  has a past medical history of Bladder cancer (HCC), Cancer (HCC), Closed right ankle fracture, Depression, Generalized anxiety disorder (3/31/2020), Gout, Hyperlipidemia, Hypertension, and Lip injury. He also has no past medical history of Psychosis (HCC), Self-injurious behavior, or Suicide attempt (HCC).    ROS   Review of Systems   Constitutional: Negative for chills, fever and weight loss.   Eyes: Negative for blurred vision.   Respiratory: Negative for shortness of breath and wheezing.    Cardiovascular: Negative for chest pain and palpitations.   Gastrointestinal: Positive for abdominal pain, diarrhea, heartburn, nausea and vomiting. Negative for blood in stool, constipation and melena.   Genitourinary: Negative for dysuria.      Objective:     Physical Exam:  /78   Pulse 86   Temp 37 °C (98.6 °F) (Temporal)   Resp 15   Ht 1.829 m (6' 0.01\")   Wt 86.6 kg (191 lb)   SpO2 96%  Body mass index is 25.9 kg/m².   Constitutional: Alert, no distress.  Skin: Warm, dry, good turgor, no rashes in visible areas.  Eye: Equal, round and reactive, conjunctiva clear, lids normal.  ENMT: TM's clear bilaterally, lips without lesions, good dentition, oropharynx clear.  Neck: Trachea midline, no masses, no thyromegaly. No cervical or supraclavicular lymphadenopathy.  Respiratory: Unlabored respiratory effort, lungs clear to auscultation, no wheezes, no rhonchi.  Cardiovascular: Normal S1, S2, no murmur, no edema.  Abdomen: Soft, no masses, no hepatosplenomegaly.  Slight tenderness to palpation epigastric region of abdomen.  Psych: Alert and oriented x3, normal affect and mood.    Assessment and Plan:     1. Postprandial epigastric pain  -Uncertain etiology of postprandial pain.  Differential diagnosis does include H. pylori infection versus gastric ulcer versus gastritis.  Discussed reducing foods that could be irritating stomach including spicy foods, fatty foods, alcohol.  We will begin " treatment with omeprazole and sucralfate at this time.  We will also complete H. pylori breath test to rule out H. pylori infection.  -Reflections at this time, discussed with patient strict return precautions, follow-up as needed.  - H. PYLORI, UREA BREATH TEST, ADULT; Future  - omeprazole (PRILOSEC) 40 MG delayed-release capsule; Take 1 Capsule by mouth every day for 30 days. Take with a glass of water 30 minutes before breakfast  Dispense: 30 Capsule; Refill: 6  - sucralfate (CARAFATE) 1 GM Tab; Take 1 Tablet by mouth 4 Times a Day,Before Meals and at Bedtime.  Dispense: 120 Tablet; Refill: 3    2. Epidermal inclusion cyst  -Findings today are consistent with epidermal inclusion cyst.  We will schedule patient for cyst removal near future.      Followup: Return in about 2 weeks (around 12/6/2021).         PLEASE NOTE: This dictation was created using voice recognition software. I have made every reasonable attempt to correct obvious errors, but I expect that there are errors of grammar and possibly content that I did not discover before finalizing the note.

## 2021-12-02 ENCOUNTER — HOSPITAL ENCOUNTER (OUTPATIENT)
Dept: LAB | Facility: MEDICAL CENTER | Age: 71
End: 2021-12-02
Attending: FAMILY MEDICINE
Payer: MEDICARE

## 2021-12-02 ENCOUNTER — OFFICE VISIT (OUTPATIENT)
Dept: MEDICAL GROUP | Facility: LAB | Age: 71
End: 2021-12-02
Payer: MEDICARE

## 2021-12-02 VITALS
HEART RATE: 58 BPM | TEMPERATURE: 99 F | WEIGHT: 191 LBS | HEIGHT: 72 IN | OXYGEN SATURATION: 97 % | SYSTOLIC BLOOD PRESSURE: 118 MMHG | RESPIRATION RATE: 15 BRPM | BODY MASS INDEX: 25.87 KG/M2 | DIASTOLIC BLOOD PRESSURE: 68 MMHG

## 2021-12-02 DIAGNOSIS — R10.13 POSTPRANDIAL EPIGASTRIC PAIN: ICD-10-CM

## 2021-12-02 DIAGNOSIS — L72.0 EPIDERMAL INCLUSION CYST: ICD-10-CM

## 2021-12-02 PROCEDURE — 10061 I&D ABSCESS COMP/MULTIPLE: CPT | Performed by: FAMILY MEDICINE

## 2021-12-02 PROCEDURE — 83013 H PYLORI (C-13) BREATH: CPT

## 2021-12-02 ASSESSMENT — ENCOUNTER SYMPTOMS
CHILLS: 0
FEVER: 0
TINGLING: 0
SENSORY CHANGE: 0

## 2021-12-02 ASSESSMENT — FIBROSIS 4 INDEX: FIB4 SCORE: 1.77

## 2021-12-02 NOTE — PROGRESS NOTES
Subjective:   Prieto Holm is a 71 y.o. male here today for   Chief Complaint   Patient presents with   • Cyst     Removal      #Cyst:  -Patient longstanding history of cyst on the dorsal aspect of his left elbow.  He states continues to cause problems especially with when running into her hitting objects of the back of his elbow as it does cause some pain.  Patient is here today to undergo cyst removal.    No Known Allergies      Current medicines (including changes today)  Current Outpatient Medications   Medication Sig Dispense Refill   • sucralfate (CARAFATE) 1 GM Tab Take 1 Tablet by mouth 4 Times a Day,Before Meals and at Bedtime. 120 Tablet 3   • traZODone (DESYREL) 50 MG Tab TAKE 1 TABLET AT BEDTIME ASNEEDED FOR SLEEP FOR UP TO 90 DAYS 90 Tablet 0   • lisinopril (PRINIVIL) 20 MG Tab TAKE 1 TABLET DAILY 90 tablet 3   • atorvastatin (LIPITOR) 40 MG Tab TAKE 1 TABLET EVERY EVENING 90 tablet 2   • omeprazole (PRILOSEC) 40 MG delayed-release capsule Take 1 Capsule by mouth every day for 30 days. Take with a glass of water 30 minutes before breakfast (Patient not taking: Reported on 12/2/2021) 30 Capsule 6     No current facility-administered medications for this visit.     He  has a past medical history of Bladder cancer ([facility]), Cancer (HCC), Closed right ankle fracture, Depression, Generalized anxiety disorder (3/31/2020), Gout, Hyperlipidemia, Hypertension, and Lip injury. He also has no past medical history of Psychosis ([facility]), Self-injurious behavior, or Suicide attempt ([facility]).    ROS   Review of Systems   Constitutional: Negative for chills and fever.   Musculoskeletal: Negative for joint pain.   Neurological: Negative for tingling and sensory change.      Objective:     Physical Exam:  /68 (BP Location: Right arm, Patient Position: Sitting, BP Cuff Size: Adult)   Pulse (!) 58   Temp 37.2 °C (99 °F) (Temporal)   Resp 15   Ht 1.829 m (6')   Wt 86.6 kg (191 lb)   SpO2 97%  Body mass index is  25.9 kg/m².   Constitutional: Alert, no distress.  Skin: Warm, dry, good turgor, no rashes in visible areas.  Large 2 x 2 cm cystlike structure located the dorsal aspect of left elbow.  Slightly mobile, nontender to palpation.  Eye: Equal, round and reactive, conjunctiva clear, lids normal.  Respiratory: Unlabored respiratory effort, lungs clear to auscultationPsych: Alert and oriented x3, normal affect and mood.    Assessment and Plan:     1. Epidermal inclusion cyst  -Procedure was performed (see procedure note).  The cyst was a complex with several loculations that were difficult to completely drain.  Because of this I am concerned about at the epithelial lining remaining in place.  We did complete the procedure.  Patient was given care instructions, return precautions.  He will follow up with 5 days for suture removal and wound check.  There is a high likelihood that this cyst will return at which time we will refer to dermatology for further treatment.  - Consent for all Surgical, Special Diagnostic or Therapeutic Procedures  - I and D    My total time spent caring for the patient on the day of the encounter was 30 minutes.       Followup: Return in about 5 days (around 12/7/2021).         PLEASE NOTE: This dictation was created using voice recognition software. I have made every reasonable attempt to correct obvious errors, but I expect that there are errors of grammar and possibly content that I did not discover before finalizing the note.

## 2021-12-02 NOTE — PROCEDURES
I and D    Date/Time: 12/2/2021 11:30 AM  Performed by: Eliseo Ramsay M.D.  Authorized by: Eliseo Ramsay M.D.   Type: cyst  Body area: upper extremity  Location details: left elbow  Anesthesia: local infiltration    Anesthesia:  Local Anesthetic: lidocaine 1% with epinephrine  Anesthetic total: 5 mL    Sedation:  Patient sedated: no    Scalpel size: 10  Incision type: single straight  Incision depth: subcutaneous  Complexity: complex  Drainage characteristics: Caseuos.  Drainage amount: moderate  Wound treatment: The epidermal inclusion cyst was closed 3-0 Vicryl, 2 interrupted sutures.  Patient tolerance: patient tolerated the procedure well with no immediate complications  Comments: Upon drainage of cyst the area appear to be much more loculated then thought.  After several attempts with pressure to remove all caseous material and epithelial lining, incision was elongated.  While most of the caseous material was removed there is some concerned about epithelial lining being left.

## 2021-12-04 LAB — UREA BREATH TEST QL: NEGATIVE

## 2021-12-07 ENCOUNTER — TELEPHONE (OUTPATIENT)
Dept: MEDICAL GROUP | Facility: LAB | Age: 71
End: 2021-12-07

## 2021-12-07 NOTE — TELEPHONE ENCOUNTER
1. Caller Name: Prieto Holm                          Call Back Number: [phone] (home)         How would the patient prefer to be contacted with a response:     PT CRISTOFERM reports his elbow is draining white puss from the procedure done in office on 12/2/21

## 2021-12-07 NOTE — TELEPHONE ENCOUNTER
Called patient back. He states white pus, thick like cottage cheese came out of one of the incision sites when he was in the shower. He states the wound is only a little red, very mildly tender. Denies swelling, chills, or fever. I did advise patient to come in to office today to see if there is an infection going on but patient declined appt. Also offered for patient to come in the next day and he declined. He agrees to come in or go to urgent care if anything gets worse such as more pus, redness, edema or pain.     EMEKA Pearl

## 2021-12-09 ENCOUNTER — OFFICE VISIT (OUTPATIENT)
Dept: MEDICAL GROUP | Facility: LAB | Age: 71
End: 2021-12-09
Payer: MEDICARE

## 2021-12-09 VITALS
RESPIRATION RATE: 15 BRPM | OXYGEN SATURATION: 98 % | WEIGHT: 191 LBS | HEART RATE: 65 BPM | SYSTOLIC BLOOD PRESSURE: 112 MMHG | BODY MASS INDEX: 25.87 KG/M2 | DIASTOLIC BLOOD PRESSURE: 70 MMHG | HEIGHT: 72 IN | TEMPERATURE: 98.8 F

## 2021-12-09 DIAGNOSIS — L72.0 EPIDERMAL INCLUSION CYST: ICD-10-CM

## 2021-12-09 PROCEDURE — 99024 POSTOP FOLLOW-UP VISIT: CPT | Performed by: FAMILY MEDICINE

## 2021-12-09 ASSESSMENT — ENCOUNTER SYMPTOMS
FEVER: 0
SENSORY CHANGE: 0
WEAKNESS: 0
CHILLS: 0
TINGLING: 0

## 2021-12-09 ASSESSMENT — FIBROSIS 4 INDEX: FIB4 SCORE: 1.77

## 2021-12-09 NOTE — PROGRESS NOTES
"Subjective:   Prieto Holm is a 71 y.o. male here today for   Chief Complaint   Patient presents with   • Cyst         #f/u cyst removal   -Cyst removals completed on 12/2/2021.  At that time there is some concerns for incomplete cyst removal.  Patient states that over the last few days he has noticed some increasing white, caseous-like drainage from the wound.  He denies any increased tenderness, swelling, redness, heat to touch.  Here today to discuss other possible treatment measures.    No Known Allergies      Current medicines (including changes today)  Current Outpatient Medications   Medication Sig Dispense Refill   • omeprazole (PRILOSEC) 40 MG delayed-release capsule Take 1 Capsule by mouth every day for 30 days. Take with a glass of water 30 minutes before breakfast (Patient not taking: Reported on 12/2/2021) 30 Capsule 6   • sucralfate (CARAFATE) 1 GM Tab Take 1 Tablet by mouth 4 Times a Day,Before Meals and at Bedtime. 120 Tablet 3   • traZODone (DESYREL) 50 MG Tab TAKE 1 TABLET AT BEDTIME ASNEEDED FOR SLEEP FOR UP TO 90 DAYS 90 Tablet 0   • lisinopril (PRINIVIL) 20 MG Tab TAKE 1 TABLET DAILY 90 tablet 3   • atorvastatin (LIPITOR) 40 MG Tab TAKE 1 TABLET EVERY EVENING 90 tablet 2     No current facility-administered medications for this visit.     He  has a past medical history of Bladder cancer ([facility]), Cancer (HCC), Closed right ankle fracture, Depression, Generalized anxiety disorder (3/31/2020), Gout, Hyperlipidemia, Hypertension, and Lip injury. He also has no past medical history of Psychosis ([facility]), Self-injurious behavior, or Suicide attempt ([facility]).    ROS   Review of Systems   Constitutional: Negative for chills and fever.   Skin: Negative for itching and rash.   Neurological: Negative for tingling, sensory change and weakness.      Objective:     Physical Exam:  /70   Pulse 65   Temp 37.1 °C (98.8 °F) (Temporal)   Resp 15   Ht 1.829 m (6' 0.01\")   Wt 86.6 kg (191 lb)   SpO2 98% "  Body mass index is 25.9 kg/m².   Constitutional: Alert, no distress.  Skin: Warm, dry, good turgor, no rashes in visible areas.  Cyst on the dorsal aspect of left elbow persists although smaller since last physical examination.  There does appear to be signs of caseous draining around small opening at the top of cyst.  Sutures are still intact, clean, dry.  No erythema, edema, heat to touch noted.  Respiratory: Unlabored respiratory effort  Psych: Alert and oriented x3, normal affect and mood.    Assessment and Plan:     1. Epidermal inclusion cyst  -At this time I do believe that further evaluation by specialty is needed given continued Raynaud's from cyst.  I will refer to dermatology for further evaluation treatment.  Discussed infection prevention.  Return precautions given, patient will follow-up as needed.  - Referral to Dermatology      Followup: Return if symptoms worsen or fail to improve.         PLEASE NOTE: This dictation was created using voice recognition software. I have made every reasonable attempt to correct obvious errors, but I expect that there are errors of grammar and possibly content that I did not discover before finalizing the note.

## 2021-12-22 ENCOUNTER — APPOINTMENT (RX ONLY)
Dept: URBAN - METROPOLITAN AREA CLINIC 22 | Facility: CLINIC | Age: 71
Setting detail: DERMATOLOGY
End: 2021-12-22

## 2021-12-22 DIAGNOSIS — L72.8 OTHER FOLLICULAR CYSTS OF THE SKIN AND SUBCUTANEOUS TISSUE: ICD-10-CM

## 2021-12-22 DIAGNOSIS — D22 MELANOCYTIC NEVI: ICD-10-CM

## 2021-12-22 DIAGNOSIS — L81.4 OTHER MELANIN HYPERPIGMENTATION: ICD-10-CM

## 2021-12-22 DIAGNOSIS — Z71.89 OTHER SPECIFIED COUNSELING: ICD-10-CM

## 2021-12-22 PROBLEM — D22.39 MELANOCYTIC NEVI OF OTHER PARTS OF FACE: Status: ACTIVE | Noted: 2021-12-22

## 2021-12-22 PROCEDURE — ? DEFER

## 2021-12-22 PROCEDURE — ? SUNSCREEN RECOMMENDATIONS

## 2021-12-22 PROCEDURE — ? PHOTO-DOCUMENTATION

## 2021-12-22 PROCEDURE — ? REFERRAL CORRESPONDENCE

## 2021-12-22 PROCEDURE — ? COUNSELING

## 2021-12-22 PROCEDURE — 99203 OFFICE O/P NEW LOW 30 MIN: CPT

## 2021-12-22 ASSESSMENT — LOCATION ZONE DERM
LOCATION ZONE: ARM
LOCATION ZONE: FACE

## 2021-12-22 ASSESSMENT — LOCATION SIMPLE DESCRIPTION DERM
LOCATION SIMPLE: RIGHT FOREARM
LOCATION SIMPLE: RIGHT CHEEK
LOCATION SIMPLE: LEFT ELBOW
LOCATION SIMPLE: LEFT FOREARM
LOCATION SIMPLE: LEFT CHEEK

## 2021-12-22 ASSESSMENT — LOCATION DETAILED DESCRIPTION DERM
LOCATION DETAILED: LEFT PROXIMAL DORSAL FOREARM
LOCATION DETAILED: LEFT ELBOW
LOCATION DETAILED: RIGHT INFERIOR CENTRAL MALAR CHEEK
LOCATION DETAILED: LEFT LATERAL MALAR CHEEK
LOCATION DETAILED: RIGHT DISTAL DORSAL FOREARM

## 2021-12-22 NOTE — HPI: CYST
How Severe Is Your Cyst?: moderate
Is This A New Presentation, Or A Follow-Up?: Cyst
Additional History: Vicente Wells, PCP, tried to remove the cyst, lancet it and stitched it about a week ago, and patient states it’s draining and oozing, moving the arm hurts.

## 2021-12-22 NOTE — PROCEDURE: PHOTO-DOCUMENTATION
Photo Preface (Leave Blank If You Do Not Want): Photographs were obtained today
Details (Free Text): 1.5cm approximately
Detail Level: Detailed

## 2022-01-11 ENCOUNTER — APPOINTMENT (RX ONLY)
Dept: URBAN - METROPOLITAN AREA CLINIC 22 | Facility: CLINIC | Age: 72
Setting detail: DERMATOLOGY
End: 2022-01-11

## 2022-01-11 DIAGNOSIS — L72.8 OTHER FOLLICULAR CYSTS OF THE SKIN AND SUBCUTANEOUS TISSUE: ICD-10-CM

## 2022-01-11 PROBLEM — D48.5 NEOPLASM OF UNCERTAIN BEHAVIOR OF SKIN: Status: ACTIVE | Noted: 2022-01-11

## 2022-01-11 PROCEDURE — 11102 TANGNTL BX SKIN SINGLE LES: CPT

## 2022-01-11 PROCEDURE — ? BIOPSY BY SHAVE METHOD

## 2022-01-11 PROCEDURE — ? PRESCRIPTION

## 2022-01-11 RX ORDER — DOXYCYCLINE HYCLATE 100 MG/1
1 CAPSULE, GELATIN COATED ORAL BID
Qty: 28 | Refills: 0 | Status: ERX | COMMUNITY
Start: 2022-01-11

## 2022-01-11 RX ADMIN — DOXYCYCLINE HYCLATE 1: 100 CAPSULE, GELATIN COATED ORAL at 00:00

## 2022-01-11 ASSESSMENT — LOCATION SIMPLE DESCRIPTION DERM: LOCATION SIMPLE: LEFT ELBOW

## 2022-01-11 ASSESSMENT — LOCATION ZONE DERM: LOCATION ZONE: ARM

## 2022-01-11 ASSESSMENT — LOCATION DETAILED DESCRIPTION DERM: LOCATION DETAILED: LEFT ELBOW

## 2022-01-25 ENCOUNTER — PATIENT MESSAGE (OUTPATIENT)
Dept: MEDICAL GROUP | Facility: LAB | Age: 72
End: 2022-01-25

## 2022-01-25 DIAGNOSIS — M1A.0210 CHRONIC GOUT OF RIGHT ELBOW, UNSPECIFIED CAUSE: ICD-10-CM

## 2022-01-26 NOTE — TELEPHONE ENCOUNTER
I would like to see him in office to discuss further treatment. Before he come in I want him to complete some lab work that I have ordered so we can see how bad the gout really is.

## 2022-01-28 ENCOUNTER — HOSPITAL ENCOUNTER (OUTPATIENT)
Dept: LAB | Facility: MEDICAL CENTER | Age: 72
End: 2022-01-28
Attending: FAMILY MEDICINE
Payer: MEDICARE

## 2022-01-28 DIAGNOSIS — M1A.0210 CHRONIC GOUT OF RIGHT ELBOW, UNSPECIFIED CAUSE: ICD-10-CM

## 2022-01-28 LAB — URATE SERPL-MCNC: 7.9 MG/DL (ref 2.5–8.3)

## 2022-01-28 PROCEDURE — 84550 ASSAY OF BLOOD/URIC ACID: CPT

## 2022-01-28 PROCEDURE — 36415 COLL VENOUS BLD VENIPUNCTURE: CPT

## 2022-02-02 ENCOUNTER — OFFICE VISIT (OUTPATIENT)
Dept: MEDICAL GROUP | Facility: LAB | Age: 72
End: 2022-02-02
Payer: MEDICARE

## 2022-02-02 VITALS
WEIGHT: 186 LBS | SYSTOLIC BLOOD PRESSURE: 128 MMHG | HEART RATE: 62 BPM | OXYGEN SATURATION: 99 % | RESPIRATION RATE: 14 BRPM | DIASTOLIC BLOOD PRESSURE: 80 MMHG | BODY MASS INDEX: 25.19 KG/M2 | TEMPERATURE: 98.2 F | HEIGHT: 72 IN

## 2022-02-02 DIAGNOSIS — M1A.0220 CHRONIC GOUT OF LEFT ELBOW, UNSPECIFIED CAUSE: ICD-10-CM

## 2022-02-02 PROCEDURE — 99213 OFFICE O/P EST LOW 20 MIN: CPT | Performed by: FAMILY MEDICINE

## 2022-02-02 RX ORDER — ALLOPURINOL 100 MG/1
100 TABLET ORAL DAILY
Qty: 90 TABLET | Refills: 3 | Status: SHIPPED | OUTPATIENT
Start: 2022-02-02 | End: 2022-02-02 | Stop reason: SDUPTHER

## 2022-02-02 RX ORDER — ALLOPURINOL 100 MG/1
100 TABLET ORAL DAILY
Qty: 90 TABLET | Refills: 3 | Status: SHIPPED | OUTPATIENT
Start: 2022-02-02 | End: 2022-05-03

## 2022-02-02 ASSESSMENT — ENCOUNTER SYMPTOMS
WHEEZING: 0
SHORTNESS OF BREATH: 0
DIARRHEA: 0
FEVER: 0
CHILLS: 0
ABDOMINAL PAIN: 0
VOMITING: 0
PALPITATIONS: 0
HEADACHES: 0
DIZZINESS: 0
NAUSEA: 0

## 2022-02-02 ASSESSMENT — PATIENT HEALTH QUESTIONNAIRE - PHQ9
SUM OF ALL RESPONSES TO PHQ QUESTIONS 1-9: 8
CLINICAL INTERPRETATION OF PHQ2 SCORE: 4
5. POOR APPETITE OR OVEREATING: 1 - SEVERAL DAYS

## 2022-02-02 ASSESSMENT — FIBROSIS 4 INDEX: FIB4 SCORE: 1.77

## 2022-02-02 NOTE — PROGRESS NOTES
Subjective:   Prieto Holm is a 71 y.o. male here today for   Chief Complaint   Patient presents with   • Cyst     Follow up, patient has not gotten help from dermatology, stil has the cyst on elbow.        #Cyst:  -Patient seen by dermatology for cyst on elbow after unsuccessful removal here in office.  At first been dermatology office was diagnosed as epidermal inclusion cyst; however, after biopsy completed did show signs of uric acid crystals.  This was delivered per message by Dr. Mejia's nurse at [facility].  Patient here for follow-up.  -Patient dates that he is feeling well, continues to have some discharge from elbow and area of excision but overall doing well.  Denies any increasing pain, denies any redness, heat to touch, pain to touch.  -Patient states that he does have a remote history of gout of right ankle.  Denies any swelling, pain, decreased motion in any other joint.      No Known Allergies      Current medicines (including changes today)  Current Outpatient Medications   Medication Sig Dispense Refill   • sucralfate (CARAFATE) 1 GM Tab Take 1 Tablet by mouth 4 Times a Day,Before Meals and at Bedtime. 120 Tablet 3   • traZODone (DESYREL) 50 MG Tab TAKE 1 TABLET AT BEDTIME ASNEEDED FOR SLEEP FOR UP TO 90 DAYS 90 Tablet 0   • lisinopril (PRINIVIL) 20 MG Tab TAKE 1 TABLET DAILY 90 tablet 3   • atorvastatin (LIPITOR) 40 MG Tab TAKE 1 TABLET EVERY EVENING 90 tablet 2     No current facility-administered medications for this visit.     He  has a past medical history of Bladder cancer ([facility]), Cancer (HCC), Closed right ankle fracture, Depression, Generalized anxiety disorder (3/31/2020), Gout, Hyperlipidemia, Hypertension, and Lip injury. He also has no past medical history of Psychosis ([facility]), Self-injurious behavior, or Suicide attempt ([facility]).    ROS   Review of Systems   Constitutional: Negative for chills and fever.   Respiratory: Negative for shortness of breath and wheezing.   "  Cardiovascular: Negative for chest pain and palpitations.   Gastrointestinal: Negative for abdominal pain, diarrhea, nausea and vomiting.   Musculoskeletal: Negative for joint pain.   Neurological: Negative for dizziness and headaches.      Objective:     Physical Exam:  /80   Pulse 62   Temp 36.8 °C (98.2 °F) (Temporal)   Resp 14   Ht 1.829 m (6' 0.01\")   Wt 84.4 kg (186 lb)   SpO2 99%  Body mass index is 25.22 kg/m².   Constitutional: Alert, no distress.  Skin: Warm, dry, good turgor, no rashes in visible areas.  Area of excision on the left elbow does show signs of routine healing.  No erythema, heat to touch, tenderness to palpation.  Eye: Equal, round and reactive, conjunctiva clear, lids normal.  Respiratory: Unlabored respiratory effort  Psych: Alert and oriented x3, normal affect and mood.  Musculoskeletal: No gross motor/sensory deficits of upper extremities.    Results for NAMAN JERRY (MRN [id number]) as of 2/2/2022 12:59   Ref. Range 1/28/2022 08:42   Uric Acid Latest Ref Range: 2.5 - 8.3 mg/dL 7.9       Assessment and Plan:     1. Chronic gout of left elbow, unspecified cause  -While patient is asymptomatic at this time he did have an elevated uric acid level of 7.9.  Given his history as well as findings of cyst I do recommend we start allopurinol.  We will start medication at this time.  Also discussed patient the importance of a low purine diet.  -Discussed appropriate, routine healing of the elbow  - allopurinol (ZYLOPRIM) 100 MG Tab; Take 1 Tablet by mouth every day for 90 days.  Dispense: 90 Tablet; Refill: 3      Followup: Return in about 3 months (around 5/2/2022).         PLEASE NOTE: This dictation was created using voice recognition software. I have made every reasonable attempt to correct obvious errors, but I expect that there are errors of grammar and possibly content that I did not discover before finalizing the note.  "

## 2022-02-02 NOTE — PATIENT INSTRUCTIONS
Low-Purine Eating Plan  A low-purine eating plan involves making food choices to limit your intake of purine. Purine is a kind of uric acid. Too much uric acid in your blood can cause certain conditions, such as gout and kidney stones. Eating a low-purine diet can help control these conditions.  What are tips for following this plan?  Reading food labels    · Avoid foods with saturated or Trans fat.  · Check the ingredient list of grains-based foods, such as bread and cereal, to make sure that they contain whole grains.  · Check the ingredient list of sauces or soups to make sure they do not contain meat or fish.  · When choosing soft drinks, check the ingredient list to make sure they do not contain high-fructose corn syrup.  Shopping  · Buy plenty of fresh fruits and vegetables.  · Avoid buying canned or fresh fish.  · Buy dairy products labeled as low-fat or nonfat.  · Avoid buying premade or processed foods. These foods are often high in fat, salt (sodium), and added sugar.  Cooking  · Use olive oil instead of butter when cooking. Oils like olive oil, canola oil, and sunflower oil contain healthy fats.  Meal planning  · Learn which foods do or do not affect you. If you find out that a food tends to cause your gout symptoms to flare up, avoid eating that food. You can enjoy foods that do not cause problems. If you have any questions about a food item, talk with your dietitian or health care provider.  · Limit foods high in fat, especially saturated fat. Fat makes it harder for your body to get rid of uric acid.  · Choose foods that are lower in fat and are lean sources of protein.  General guidelines  · Limit alcohol intake to no more than 1 drink a day for nonpregnant women and 2 drinks a day for men. One drink equals 12 oz of beer, 5 oz of wine, or 1½ oz of hard liquor. Alcohol can affect the way your body gets rid of uric acid.  · Drink plenty of water to keep your urine clear or pale yellow. Fluids can help  remove uric acid from your body.  · If directed by your health care provider, take a vitamin C supplement.  · Work with your health care provider and dietitian to develop a plan to achieve or maintain a healthy weight. Losing weight can help reduce uric acid in your blood.  What foods are recommended?  The items listed may not be a complete list. Talk with your dietitian about what dietary choices are best for you.  Foods low in purines  Foods low in purines do not need to be limited. These include:  · All fruits.  · All low-purine vegetables, pickles, and olives.  · Breads, pasta, rice, cornbread, and popcorn. Cake and other baked goods.  · All dairy foods.  · Eggs, nuts, and nut butters.  · Spices and condiments, such as salt, herbs, and vinegar.  · Plant oils, butter, and margarine.  · Water, sugar-free soft drinks, tea, coffee, and cocoa.  · Vegetable-based soups, broths, sauces, and gravies.  Foods moderate in purines  Foods moderate in purines should be limited to the amounts listed.  · ½ cup of asparagus, cauliflower, spinach, mushrooms, or green peas, each day.  · 2/3 cup uncooked oatmeal, each day.  · ¼ cup dry wheat bran or wheat germ, each day.  · 2-3 ounces of meat or poultry, each day.  · 4-6 ounces of shellfish, such as crab, lobster, oysters, or shrimp, each day.  · 1 cup cooked beans, peas, or lentils, each day.  · Soup, broths, or bouillon made from meat or fish. Limit these foods as much as possible.  What foods are not recommended?  The items listed may not be a complete list. Talk with your dietitian about what dietary choices are best for you.  Limit your intake of foods high in purines, including:  · Beer and other alcohol.  · Meat-based gravy or sauce.  · Canned or fresh fish, such as:  ? Anchovies, sardines, herring, and tuna.  ? Mussels and scallops.  ? Codfish, trout, and jessa.  · [place].  · Organ meats, such as:  ? Liver or kidney.  ? Tripe.  ? Sweetbreads (thymus gland or  pancreas).  · Wild game or goose.  · Yeast or yeast extract supplements.  · Drinks sweetened with high-fructose corn syrup.  Summary  · Eating a low-purine diet can help control conditions caused by too much uric acid in the body, such as gout or kidney stones.  · Choose low-purine foods, limit alcohol, and limit foods high in fat.  · You will learn over time which foods do or do not affect you. If you find out that a food tends to cause your gout symptoms to flare up, avoid eating that food.  This information is not intended to replace advice given to you by your health care provider. Make sure you discuss any questions you have with your health care provider.  Document Released: 04/13/2012 Document Revised: 11/30/2018 Document Reviewed: 01/31/2018  Elsevier Patient Education © 2020 Elsevier Inc.

## 2022-02-02 NOTE — LETTER
[employer] [facility]  Eliseo Ramsay M.D.  [address]  [place] [address]  Fax: [phone]   Authorization for Release/Disclosure of   Protected Health Information   Name: YAZMIN JERRY : 1950 SSN: [id number]   Address: [address] [place] [address]  [place] [address] Phone:    [phone] (home)    I authorize the entity listed below to release/disclose the PHI below to:   [facility]/Eliseo Ramsay M.D. and Eliseo Ramsay M.D.   Provider or Entity Name: [facility]; Giuseppe Mejia M.D   Address   [facility]   Phone:[phone]    Fax: [phone]   Reason for request: continuity of care   Information to be released:    [  ] LAST COLONOSCOPY,  including any PATH REPORT and follow-up  [  ] LAST FIT/COLOGUARD RESULT [  ] LAST DEXA  [  ] LAST MAMMOGRAM  [  ] LAST PAP  [  ] LAST LABS [  ] RETINA EXAM REPORT  [  ] IMMUNIZATION RECORDS  [ XXX ] Release all info      [  ] Check here and initial the line next to each item to release ALL health information INCLUDING  _____ Care and treatment for drug and / or alcohol abuse  _____ HIV testing, infection status, or AIDS  _____ Genetic Testing    DATES OF SERVICE OR TIME PERIOD TO BE DISCLOSED: _____________  I understand and acknowledge that:  * This Authorization may be revoked at any time by you in writing, except if your health information has already been used or disclosed.  * Your health information that will be used or disclosed as a result of you signing this authorization could be re-disclosed by the recipient. If this occurs, your re-disclosed health information may no longer be protected by State or Federal laws.  * You may refuse to sign this Authorization. Your refusal will not affect your ability to obtain treatment.  * This Authorization becomes effective upon signing and will  on (date) __________.      If no date is indicated, this Authorization will  one (1) year from the signature  date.    Name: Prieto Vish Holm    Signature:Contuinty of care, PCP requests    Date:     2/2/2022       PLEASE FAX REQUESTED RECORDS BACK TO: [phone]

## 2022-02-24 ENCOUNTER — PATIENT MESSAGE (OUTPATIENT)
Dept: MEDICAL GROUP | Facility: LAB | Age: 72
End: 2022-02-24
Payer: MEDICARE

## 2022-02-24 DIAGNOSIS — R22.31 MASS OF RIGHT ELBOW: ICD-10-CM

## 2022-02-28 NOTE — TELEPHONE ENCOUNTER
From: Prieto Holm  To: Physician Eliseo Ramsay  Sent: 2/24/2022 11:08 AM PST  Subject: Elbow    Good Morning,,,the elbow is still a little sore at time and swollen at the cyst,its not oozing out any pus, it does'nt seen to be going away.Did you get the biopsy results from the skin place ? What is the next thing to do for this elbow ? Do we need to have it removed ? Thanks,,,get back to me .

## 2022-03-14 ENCOUNTER — TELEPHONE (OUTPATIENT)
Dept: MEDICAL GROUP | Facility: LAB | Age: 72
End: 2022-03-14
Payer: MEDICARE

## 2022-03-14 DIAGNOSIS — E78.2 MIXED HYPERLIPIDEMIA: ICD-10-CM

## 2022-03-14 DIAGNOSIS — I10 ESSENTIAL HYPERTENSION: ICD-10-CM

## 2022-03-14 DIAGNOSIS — R22.31 MASS OF RIGHT ELBOW: ICD-10-CM

## 2022-03-14 RX ORDER — LISINOPRIL 20 MG/1
TABLET ORAL
Qty: 90 TABLET | Refills: 3 | Status: SHIPPED | OUTPATIENT
Start: 2022-03-14 | End: 2023-03-15

## 2022-03-14 RX ORDER — ATORVASTATIN CALCIUM 40 MG/1
TABLET, FILM COATED ORAL
Qty: 90 TABLET | Refills: 2 | Status: SHIPPED | OUTPATIENT
Start: 2022-03-14 | End: 2022-11-30

## 2022-03-14 NOTE — TELEPHONE ENCOUNTER
Received request via: Pharmacy    Was the patient seen in the last year in this department? Yes  2/2/2022  Does the patient have an active prescription (recently filled or refills available) for medication(s) requested? No

## 2022-03-15 NOTE — TELEPHONE ENCOUNTER
1. Caller Name: Prieto Holm                          Call Back Number: [phone] (home)         How would the patient prefer to be contacted with a response: Phone call do NOT leave a detailed message    Patient called and spoke to me, upset with this process regarding his elbow gout cyst. [facility] stated that patient should be seen my ortho.   Patient is really just wanting this cyst to be chopped off at this point, please advise on urgent referral to better asisst patient.

## 2022-03-16 NOTE — TELEPHONE ENCOUNTER
Phone Number Called: [phone] (home)       Call outcome: Spoke to patient regarding message below.    Message: Notified pt.

## 2022-04-20 ENCOUNTER — TELEPHONE (OUTPATIENT)
Dept: MEDICAL GROUP | Facility: LAB | Age: 72
End: 2022-04-20
Payer: MEDICARE

## 2022-04-20 NOTE — TELEPHONE ENCOUNTER
1. Caller Name: Alfredo                        Call Back Number: [phone] (home)        How would the patient prefer to be contacted with a response: Phone call OK to leave a detailed message    Pt saw Dr Hernandez for his elbow.  Nothing was done for it and it still bothers him.  Please advise.

## 2022-08-08 DIAGNOSIS — F33.1 DEPRESSION, MAJOR, RECURRENT, MODERATE (HCC): ICD-10-CM

## 2022-08-08 NOTE — TELEPHONE ENCOUNTER
Received request via: Pharmacy    Was the patient seen in the last year in this department? Yes  2/2/22  Does the patient have an active prescription (recently filled or refills available) for medication(s) requested? No

## 2022-08-09 RX ORDER — TRAZODONE HYDROCHLORIDE 50 MG/1
TABLET ORAL
Qty: 90 TABLET | Refills: 0 | Status: SHIPPED | OUTPATIENT
Start: 2022-08-09 | End: 2022-10-31 | Stop reason: SDUPTHER

## 2022-10-31 ENCOUNTER — NON-PROVIDER VISIT (OUTPATIENT)
Dept: MEDICAL GROUP | Facility: LAB | Age: 72
End: 2022-10-31
Payer: MEDICARE

## 2022-10-31 DIAGNOSIS — Z23 NEED FOR VACCINATION: ICD-10-CM

## 2022-10-31 DIAGNOSIS — F33.1 DEPRESSION, MAJOR, RECURRENT, MODERATE (HCC): ICD-10-CM

## 2022-10-31 PROCEDURE — G0008 ADMIN INFLUENZA VIRUS VAC: HCPCS | Performed by: FAMILY MEDICINE

## 2022-10-31 PROCEDURE — 90662 IIV NO PRSV INCREASED AG IM: CPT | Performed by: FAMILY MEDICINE

## 2022-10-31 RX ORDER — TRAZODONE HYDROCHLORIDE 50 MG/1
TABLET ORAL
Qty: 90 TABLET | Refills: 0 | Status: SHIPPED | OUTPATIENT
Start: 2022-10-31 | End: 2023-01-18

## 2022-10-31 NOTE — PROGRESS NOTES
"Alfredo Holm is a 72 y.o. male here for a non-provider visit for:   FLU    Reason for immunization: Overdue/Provider Recommended  Immunization records indicate need for vaccine: Yes, confirmed with Epic  Minimum interval has been met for this vaccine: Yes  ABN completed: Not Indicated    VIS Dated  10/26/2021 was given to patient: Yes  All IAC Questionnaire questions were answered \"No.\"    Patient tolerated injection and no adverse effects were observed or reported: Yes    Pt scheduled for next dose in series: No  "

## 2022-11-02 ENCOUNTER — PATIENT MESSAGE (OUTPATIENT)
Dept: HEALTH INFORMATION MANAGEMENT | Facility: OTHER | Age: 72
End: 2022-11-02

## 2022-12-01 ENCOUNTER — OFFICE VISIT (OUTPATIENT)
Dept: MEDICAL GROUP | Facility: LAB | Age: 72
End: 2022-12-01
Payer: MEDICARE

## 2022-12-01 VITALS
HEIGHT: 72 IN | DIASTOLIC BLOOD PRESSURE: 70 MMHG | RESPIRATION RATE: 14 BRPM | BODY MASS INDEX: 25.73 KG/M2 | HEART RATE: 68 BPM | TEMPERATURE: 97.6 F | WEIGHT: 190 LBS | OXYGEN SATURATION: 99 % | SYSTOLIC BLOOD PRESSURE: 118 MMHG

## 2022-12-01 DIAGNOSIS — I10 PRIMARY HYPERTENSION: ICD-10-CM

## 2022-12-01 DIAGNOSIS — E78.2 MIXED HYPERLIPIDEMIA: ICD-10-CM

## 2022-12-01 DIAGNOSIS — M25.571 CHRONIC PAIN OF RIGHT ANKLE: ICD-10-CM

## 2022-12-01 DIAGNOSIS — Z12.5 ENCOUNTER FOR SCREENING FOR MALIGNANT NEOPLASM OF PROSTATE: ICD-10-CM

## 2022-12-01 DIAGNOSIS — R53.83 OTHER FATIGUE: ICD-10-CM

## 2022-12-01 DIAGNOSIS — G89.29 CHRONIC PAIN OF RIGHT ANKLE: ICD-10-CM

## 2022-12-01 PROCEDURE — 99214 OFFICE O/P EST MOD 30 MIN: CPT | Performed by: FAMILY MEDICINE

## 2022-12-01 RX ORDER — PREDNISOLONE ACETATE 10 MG/ML
SUSPENSION/ DROPS OPHTHALMIC
COMMUNITY
Start: 2022-10-05 | End: 2023-04-05

## 2022-12-01 RX ORDER — KETOROLAC TROMETHAMINE 5 MG/ML
SOLUTION OPHTHALMIC
COMMUNITY
Start: 2022-10-07 | End: 2023-04-05

## 2022-12-01 RX ORDER — TIMOLOL MALEATE 5 MG/ML
SOLUTION/ DROPS OPHTHALMIC
COMMUNITY
Start: 2022-10-05 | End: 2023-04-05

## 2022-12-01 ASSESSMENT — ENCOUNTER SYMPTOMS
NAUSEA: 0
VOMITING: 0
FEVER: 0
WHEEZING: 0
PALPITATIONS: 0
DIARRHEA: 0
SHORTNESS OF BREATH: 0
CONSTIPATION: 0
BLOOD IN STOOL: 0
ABDOMINAL PAIN: 0

## 2022-12-01 ASSESSMENT — FIBROSIS 4 INDEX: FIB4 SCORE: 1.79

## 2022-12-01 NOTE — PROGRESS NOTES
Subjective:   Prieto Holm is a 72 y.o. male here today for   Chief Complaint   Patient presents with   • Annual Exam   • Patient Question     X follow up on health,eye health, Mainly right ankle foot pain, previus accident      #Right ankle pain:  -2019 patient sustained injury at work causing type II open fracture of distal end of right tibia and fibula requiring external fixation followed by internal fixation.  He gone through physical therapy had been feeling well; however, he states that the ankles become much more stiff, started develop a chronic intermittent pain on the lateral aspect causing decrease in mobility.  He denies any numbness, tingling pain.  Denies any recent trauma.  Here for further evaluation.    #Fatigue:  -Patient is a longstanding history of chronic right ankle pain as discussed above as well as glaucoma.  He states he recently switched his glaucoma medications which is caused some strain to his vision.  Because of this he has been sitting and much more sedentary than before.  He states because of this has had increasing fatigue.  He continues to sleep well but notices a significant drop in his energy level.    #Hyperlipidemia:  -Chronic longstanding condition currently treating with Lipitor daily.  No real exercise regimen.  Continues work on appropriate diet.    #HTN  Doing well.  Reviewed labs with the patient. Taking medications as prescribed. No chest pain palpitations or shortness of breath. No headache loss or change in vision.      No Known Allergies      Current medicines (including changes today)  Current Outpatient Medications   Medication Sig Dispense Refill   • prednisoLONE acetate (PRED FORTE) 1 % Suspension INSTILL ONE DROP RIGHT EYE FOUR TIMES A DAY     • ketorolac (ACULAR) 0.5 % Solution INSTILL 1 DROP INTO RIGHT EYE 4 TIMES A DAY     • timolol (TIMOPTIC) 0.5 % Solution INSTILL ONE DROP RIGHT EYE TWICE DAILY     • atorvastatin (LIPITOR) 40 MG Tab TAKE 1 TABLET  "EVERY EVENING 90 Tablet 1   • traZODone (DESYREL) 50 MG Tab TAKE 1 TABLET AT BEDTIME ASNEEDED FOR SLEEP FOR UP TO 90 DAYS 90 Tablet 0   • lisinopril (PRINIVIL) 20 MG Tab TAKE 1 TABLET DAILY 90 Tablet 3   • sucralfate (CARAFATE) 1 GM Tab Take 1 Tablet by mouth 4 Times a Day,Before Meals and at Bedtime. 120 Tablet 3     No current facility-administered medications for this visit.     He  has a past medical history of Bladder cancer (HCC), Cancer (HCC), Closed right ankle fracture, Depression, Generalized anxiety disorder (3/31/2020), Gout, Hyperlipidemia, Hypertension, and Lip injury.    He has no past medical history of Psychosis (HCC), Self-injurious behavior, or Suicide attempt ([facility]).    ROS   Review of Systems   Constitutional:  Negative for fever.   Respiratory:  Negative for shortness of breath and wheezing.    Cardiovascular:  Negative for chest pain and palpitations.   Gastrointestinal:  Negative for abdominal pain, blood in stool, constipation, diarrhea, nausea and vomiting.      Objective:     Physical Exam:  /70   Pulse 68   Temp 36.4 °C (97.6 °F) (Temporal)   Resp 14   Ht 1.829 m (6' 0.01\")   Wt 86.2 kg (190 lb)   SpO2 99%  Body mass index is 25.76 kg/m².   Constitutional: Alert, no distress.  Skin: Warm, dry, good turgor, no rashes in visible areas.  Eye: Equal, round and reactive, conjunctiva clear, lids normal.  Respiratory: Unlabored respiratory effort, lungs clear to auscultation, no wheezes, no rhonchi.  Cardiovascular: Normal S1, S2, no murmur, no edema.  Abdomen: Soft, non-tender, no masses, no hepatosplenomegaly.  Psych: Alert and oriented x3, normal affect and mood.    Assessment and Plan:     1. Chronic pain of right ankle  -Chronic pain most likely secondary to procedure back in 2019.  Patient sedentary lifestyle also is most likely playing a part in return of pain in ankle.  I do think further evaluation and treatment with strengthening and stretching with physical therapy is " appropriate will refer back to physical therapy.  Patient will follow-up in 3 months, sooner if symptoms are not improving.  - Referral to Physical Therapy    2. Primary hypertension  -Stable, no concerns.  We will continue with lisinopril daily.  Blood pressure is at goal.  We will check labs as below.    3. Other fatigue  -Fatigue is most likely caused by new sedentary lifestyle.  We will check labs to rule out other secondary causes.  Discussed finding exercises and activities that are low resistance and will not cause any more injury to ankle.  - Comp Metabolic Panel; Future  - TSH; Future  - CBC WITHOUT DIFFERENTIAL; Future    4. Mixed hyperlipidemia  -Stable.  Continue with statin.  Check lipid panel at this time.  - Lipid Profile; Future    5. Encounter for screening for malignant neoplasm of prostate  - PROSTATE SPECIFIC AG SCREENING; Future    Followup: No follow-ups on file.         PLEASE NOTE: This dictation was created using voice recognition software. I have made every reasonable attempt to correct obvious errors, but I expect that there are errors of grammar and possibly content that I did not discover before finalizing the note.

## 2023-01-31 ENCOUNTER — HOSPITAL ENCOUNTER (OUTPATIENT)
Dept: LAB | Facility: MEDICAL CENTER | Age: 73
End: 2023-01-31
Attending: FAMILY MEDICINE
Payer: MEDICARE

## 2023-01-31 DIAGNOSIS — E78.2 MIXED HYPERLIPIDEMIA: ICD-10-CM

## 2023-01-31 DIAGNOSIS — Z12.5 ENCOUNTER FOR SCREENING FOR MALIGNANT NEOPLASM OF PROSTATE: ICD-10-CM

## 2023-01-31 DIAGNOSIS — R53.83 OTHER FATIGUE: ICD-10-CM

## 2023-01-31 LAB
ALBUMIN SERPL BCP-MCNC: 4.7 G/DL (ref 3.2–4.9)
ALBUMIN/GLOB SERPL: 2.2 G/DL
ALP SERPL-CCNC: 80 U/L (ref 30–99)
ALT SERPL-CCNC: 22 U/L (ref 2–50)
ANION GAP SERPL CALC-SCNC: 12 MMOL/L (ref 7–16)
AST SERPL-CCNC: 16 U/L (ref 12–45)
BILIRUB SERPL-MCNC: 0.7 MG/DL (ref 0.1–1.5)
BUN SERPL-MCNC: 12 MG/DL (ref 8–22)
CALCIUM ALBUM COR SERPL-MCNC: 10 MG/DL (ref 8.5–10.5)
CALCIUM SERPL-MCNC: 10.6 MG/DL (ref 8.5–10.5)
CHLORIDE SERPL-SCNC: 106 MMOL/L (ref 96–112)
CHOLEST SERPL-MCNC: 189 MG/DL (ref 100–199)
CO2 SERPL-SCNC: 23 MMOL/L (ref 20–33)
CREAT SERPL-MCNC: 0.94 MG/DL (ref 0.5–1.4)
ERYTHROCYTE [DISTWIDTH] IN BLOOD BY AUTOMATED COUNT: 46.8 FL (ref 35.9–50)
FASTING STATUS PATIENT QL REPORTED: NORMAL
GFR SERPLBLD CREATININE-BSD FMLA CKD-EPI: 87 ML/MIN/1.73 M 2
GLOBULIN SER CALC-MCNC: 2.1 G/DL (ref 1.9–3.5)
GLUCOSE SERPL-MCNC: 108 MG/DL (ref 65–99)
HCT VFR BLD AUTO: 50.4 % (ref 42–52)
HDLC SERPL-MCNC: 48 MG/DL
HGB BLD-MCNC: 16.3 G/DL (ref 14–18)
LDLC SERPL CALC-MCNC: 113 MG/DL
MCH RBC QN AUTO: 30.8 PG (ref 27–33)
MCHC RBC AUTO-ENTMCNC: 32.3 G/DL (ref 33.7–35.3)
MCV RBC AUTO: 95.3 FL (ref 81.4–97.8)
PLATELET # BLD AUTO: 202 K/UL (ref 164–446)
PMV BLD AUTO: 12 FL (ref 9–12.9)
POTASSIUM SERPL-SCNC: 5.3 MMOL/L (ref 3.6–5.5)
PROT SERPL-MCNC: 6.8 G/DL (ref 6–8.2)
PSA SERPL-MCNC: 2.31 NG/ML (ref 0–4)
RBC # BLD AUTO: 5.29 M/UL (ref 4.7–6.1)
SODIUM SERPL-SCNC: 141 MMOL/L (ref 135–145)
TRIGL SERPL-MCNC: 141 MG/DL (ref 0–149)
TSH SERPL DL<=0.005 MIU/L-ACNC: 1.12 UIU/ML (ref 0.38–5.33)
WBC # BLD AUTO: 7.5 K/UL (ref 4.8–10.8)

## 2023-01-31 PROCEDURE — 85027 COMPLETE CBC AUTOMATED: CPT

## 2023-01-31 PROCEDURE — 80061 LIPID PANEL: CPT

## 2023-01-31 PROCEDURE — 80053 COMPREHEN METABOLIC PANEL: CPT

## 2023-01-31 PROCEDURE — 36415 COLL VENOUS BLD VENIPUNCTURE: CPT

## 2023-01-31 PROCEDURE — 84443 ASSAY THYROID STIM HORMONE: CPT

## 2023-01-31 PROCEDURE — 84153 ASSAY OF PSA TOTAL: CPT | Mod: GA

## 2023-03-14 DIAGNOSIS — I10 ESSENTIAL HYPERTENSION: ICD-10-CM

## 2023-03-14 NOTE — TELEPHONE ENCOUNTER
Received request via: Pharmacy    Was the patient seen in the last year in this department? Yes  LOV 12/01/2022  Does the patient have an active prescription (recently filled or refills available) for medication(s) requested? No    Does the patient have retirement Plus and need 100 day supply (blood pressure, diabetes and cholesterol meds only)? Patient does not have SCP

## 2023-03-15 RX ORDER — LISINOPRIL 20 MG/1
TABLET ORAL
Qty: 90 TABLET | Refills: 3 | Status: SHIPPED | OUTPATIENT
Start: 2023-03-15 | End: 2024-02-12 | Stop reason: SDUPTHER

## 2023-04-05 ENCOUNTER — OFFICE VISIT (OUTPATIENT)
Dept: MEDICAL GROUP | Facility: LAB | Age: 73
End: 2023-04-05
Payer: MEDICARE

## 2023-04-05 VITALS
BODY MASS INDEX: 25.06 KG/M2 | RESPIRATION RATE: 15 BRPM | OXYGEN SATURATION: 97 % | TEMPERATURE: 97.5 F | SYSTOLIC BLOOD PRESSURE: 126 MMHG | HEIGHT: 72 IN | HEART RATE: 68 BPM | DIASTOLIC BLOOD PRESSURE: 70 MMHG | WEIGHT: 185 LBS

## 2023-04-05 DIAGNOSIS — H61.22 IMPACTED CERUMEN OF LEFT EAR: ICD-10-CM

## 2023-04-05 DIAGNOSIS — R41.3 MEMORY CHANGES: ICD-10-CM

## 2023-04-05 PROCEDURE — 99999 EAR WAX REMOVAL: CPT | Performed by: FAMILY MEDICINE

## 2023-04-05 PROCEDURE — 99213 OFFICE O/P EST LOW 20 MIN: CPT | Performed by: FAMILY MEDICINE

## 2023-04-05 ASSESSMENT — ENCOUNTER SYMPTOMS
VOMITING: 0
NAUSEA: 0
PALPITATIONS: 0
CONSTIPATION: 0
NERVOUS/ANXIOUS: 0
DEPRESSION: 0
ABDOMINAL PAIN: 0
CHILLS: 0
WHEEZING: 0
DIARRHEA: 0
SHORTNESS OF BREATH: 0
FEVER: 0

## 2023-04-05 ASSESSMENT — FIBROSIS 4 INDEX: FIB4 SCORE: 1.2

## 2023-04-05 ASSESSMENT — PATIENT HEALTH QUESTIONNAIRE - PHQ9: CLINICAL INTERPRETATION OF PHQ2 SCORE: 0

## 2023-04-05 NOTE — PROGRESS NOTES
"Subjective:   Prieto Holm is a 72 y.o. male here today for   Chief Complaint   Patient presents with    Wax in Ear       #Cerumen impaction   -Has started noting hearing loss in ears, left greater than right.  He is also noted some \"popping and snapping\" coming from left ear.  He is concerned about possible cerumen impaction affecting hearing.  Here for further evaluation.    #Memory changes:  -Patient has noticed some short-term memory loss.  He states he has difficulty remembering certain details of his day.  He gave the example of not remembering what he ate for dinner several days ago.  He has noticed that some of the salient details of his day-to-day life has been \"slipping\"; however, he is able to remember very well events that happened in his life remotely.  He does have a significant family history of father who  of Alzheimer's, mother currently with some dementia.  Has been working on staying socially active, spending time with family members, appropriate diet and exercise regiment.    No Known Allergies      Current medicines (including changes today)  Current Outpatient Medications   Medication Sig Dispense Refill    lisinopril (PRINIVIL) 20 MG Tab TAKE 1 TABLET DAILY 90 Tablet 3    traZODone (DESYREL) 50 MG Tab TAKE 1 TABLET AT BEDTIME ASNEEDED FOR SLEEP FOR UP TO 90 DAYS 90 Tablet 2    ketorolac (ACULAR) 0.5 % Solution INSTILL 1 DROP INTO RIGHT EYE 4 TIMES A DAY      prednisoLONE acetate (PRED FORTE) 1 % Suspension INSTILL ONE DROP RIGHT EYE FOUR TIMES A DAY      timolol (TIMOPTIC) 0.5 % Solution INSTILL ONE DROP RIGHT EYE TWICE DAILY      atorvastatin (LIPITOR) 40 MG Tab TAKE 1 TABLET EVERY EVENING 90 Tablet 1     No current facility-administered medications for this visit.     He  has a past medical history of Bladder cancer (HCC), Cancer (HCC), Closed right ankle fracture, Depression, Generalized anxiety disorder (3/31/2020), Gout, Hyperlipidemia, Hypertension, and Lip injury.    He has " "no past medical history of Psychosis ([facility]), Self-injurious behavior, or Suicide attempt ([facility]).    ROS   Review of Systems   Constitutional:  Negative for chills and fever.   Respiratory:  Negative for shortness of breath and wheezing.    Cardiovascular:  Negative for chest pain and palpitations.   Gastrointestinal:  Negative for abdominal pain, constipation, diarrhea, nausea and vomiting.   Psychiatric/Behavioral:  Negative for depression. The patient is not nervous/anxious.       Objective:     Physical Exam:  /70   Pulse 68   Temp 36.4 °C (97.5 °F) (Temporal)   Resp 15   Ht 1.829 m (6' 0.01\")   Wt 83.9 kg (185 lb)   SpO2 97%  Body mass index is 25.08 kg/m².   Constitutional: Alert, no distress.  Skin: Warm, dry, good turgor, no rashes in visible areas.  Eye: Equal, round and reactive, conjunctiva clear, lids normal.  ENMT: Right TM clear, pearly, normal light reflex.  Left TM on able to appreciate given cerumen impaction, lips without lesions, good dentition, oropharynx clear..  Respiratory: Unlabored respiratory effort  Psych: Alert and oriented x3, normal affect and mood.    Assessment and Plan:     1. Impacted cerumen of left ear  -Cerumen impaction removal completed, successful with complete removal of cerumen.  Joseph examination shows clear, pearly TM of left ear, normal light reflex.  Return precautions were given, patient will follow-up as needed.  - Ear Wax Removal    2. Memory changes  -Uncertain memory changes at this time.  I am concerned that potentially some changes in hearing due to cerumen impaction could been affecting his memory.  Patient will continue to monitor symptoms, especially with hearing improved with cerumen impaction removal.  We will have patient follow-up to discuss MoCA testing.  Return precautions were given, red flag symptoms at this time.    Followup: No follow-ups on file.         PLEASE NOTE: This dictation was created using voice recognition software. I have made " every reasonable attempt to correct obvious errors, but I expect that there are errors of grammar and possibly content that I did not discover before finalizing the note.

## 2023-04-05 NOTE — PROCEDURES
Ear Wax Removal    Date/Time: 4/5/2023 10:49 AM  Performed by: [facility]  Authorized by: Eliseo Ramsay M.D.     Anesthesia:  Local Anesthetic: none  Ceruminolytics applied: Ceruminolytics applied prior to the procedure.  Location details: left ear  Patient tolerance: patient tolerated the procedure well with no immediate complications  Procedure type: irrigation   Sedation:  Patient sedated: no

## 2023-04-14 ENCOUNTER — TELEPHONE (OUTPATIENT)
Dept: HEALTH INFORMATION MANAGEMENT | Facility: OTHER | Age: 73
End: 2023-04-14

## 2023-04-24 ENCOUNTER — OFFICE VISIT (OUTPATIENT)
Dept: MEDICAL GROUP | Facility: LAB | Age: 73
End: 2023-04-24
Payer: MEDICARE

## 2023-04-24 VITALS
HEIGHT: 72 IN | HEART RATE: 60 BPM | DIASTOLIC BLOOD PRESSURE: 62 MMHG | RESPIRATION RATE: 14 BRPM | TEMPERATURE: 98 F | WEIGHT: 184.3 LBS | BODY MASS INDEX: 24.96 KG/M2 | OXYGEN SATURATION: 96 % | SYSTOLIC BLOOD PRESSURE: 116 MMHG

## 2023-04-24 DIAGNOSIS — R41.3 MEMORY CHANGE: ICD-10-CM

## 2023-04-24 PROCEDURE — 99214 OFFICE O/P EST MOD 30 MIN: CPT | Performed by: FAMILY MEDICINE

## 2023-04-24 ASSESSMENT — ENCOUNTER SYMPTOMS
VOMITING: 0
CONSTIPATION: 0
DEPRESSION: 0
CHILLS: 0
SHORTNESS OF BREATH: 0
ABDOMINAL PAIN: 0
FEVER: 0
NAUSEA: 0
NERVOUS/ANXIOUS: 0
WHEEZING: 0
PALPITATIONS: 0
DIARRHEA: 0

## 2023-04-24 ASSESSMENT — FIBROSIS 4 INDEX: FIB4 SCORE: 1.2

## 2023-04-24 NOTE — PROGRESS NOTES
"Subjective:   Prieto Holm is a 72 y.o. male here today for   No chief complaint on file.      #Memory concerns:  -Patient here to follow-up for memory testing with MMSE as well as review symptoms.  Patient continues to have occasional \"memory issues\".  He states that he is able to remember all past experiences.  He is able to remember to complete normal ADLs such as bathing, paying bills, preparing food.  Driving without any difficulty.  He states he continues to have episodes of hitting short-term items such as what he had for dinner 2-3 nights ago.  He sometimes will go into a store and forget why he is there.  He feels like he is doing well.  No concerns regarding hearing.  No concerns regarding depression or anxiety.  Patient does have history of hypertension, well controlled lisinopril 20 mg.  Is also on Lipitor 40 mg daily.    No Known Allergies      Current medicines (including changes today)  Current Outpatient Medications   Medication Sig Dispense Refill    lisinopril (PRINIVIL) 20 MG Tab TAKE 1 TABLET DAILY 90 Tablet 3    traZODone (DESYREL) 50 MG Tab TAKE 1 TABLET AT BEDTIME ASNEEDED FOR SLEEP FOR UP TO 90 DAYS 90 Tablet 2    atorvastatin (LIPITOR) 40 MG Tab TAKE 1 TABLET EVERY EVENING 90 Tablet 1     No current facility-administered medications for this visit.     He  has a past medical history of Bladder cancer ([facility]), Cancer ([facility]), Closed right ankle fracture, Depression, Generalized anxiety disorder (3/31/2020), Gout, Hyperlipidemia, Hypertension, and Lip injury.    He has no past medical history of Psychosis ([facility]), Self-injurious behavior, or Suicide attempt ([facility]).    ROS   Review of Systems   Constitutional:  Negative for chills and fever.   Respiratory:  Negative for shortness of breath and wheezing.    Cardiovascular:  Negative for chest pain and palpitations.   Gastrointestinal:  Negative for abdominal pain, constipation, diarrhea, nausea and vomiting.   Psychiatric/Behavioral:  Negative " for depression. The patient is not nervous/anxious.         Objective:     Physical Exam:  /62 (BP Location: Left arm, Patient Position: Sitting, BP Cuff Size: Adult long)   Pulse 60   Temp 36.7 °C (98 °F)   Resp 14   Ht 1.829 m (6')   Wt 83.6 kg (184 lb 4.9 oz)   SpO2 96%  Body mass index is 25 kg/m².   Constitutional: Alert, no distress.  Skin: Warm, dry, good turgor, no rashes in visible areas.  Eye: Equal, round and reactive, conjunctiva clear, lids normal.  Respiratory: Unlabored respiratory effort, lungs clear to auscultation, no wheezes, no rhonchi.  Cardiovascular: Normal S1, S2, no murmur, no edema.  Abdomen: Soft, non-tender, no masses, no hepatosplenomegaly.  Psych: Alert and oriented x3, normal affect and mood.    Assessment and Plan:     1. Memory change  -MMSE completed with a score of 30.  No other findings on physical examination.  Discussed the difference between memory loss versus memory formation latency as 1 gets older.  We ruled out through screening secondary causes.  Reviewed labs, no concerns.  No concerns for depression or anxiety.  At this time there is no concerns for dementia.  We will continue to monitor symptoms carefully and patient will follow-up if there are any changes.    My total time spent caring for the patient on the day of the encounter was 30 minutes.   This does not include time spent on separately billable procedures/tests.        Followup: No follow-ups on file.         PLEASE NOTE: This dictation was created using voice recognition software. I have made every reasonable attempt to correct obvious errors, but I expect that there are errors of grammar and possibly content that I did not discover before finalizing the note.

## 2023-06-06 DIAGNOSIS — E78.2 MIXED HYPERLIPIDEMIA: ICD-10-CM

## 2023-06-06 NOTE — TELEPHONE ENCOUNTER
Received request via: Pharmacy    Was the patient seen in the last year in this department? Yes 4/24/23    Does the patient have an active prescription (recently filled or refills available) for medication(s) requested? No    Does the patient have USP Plus and need 100 day supply (blood pressure, diabetes and cholesterol meds only)? Patient does not have SCP

## 2023-06-07 RX ORDER — ATORVASTATIN CALCIUM 40 MG/1
40 TABLET, FILM COATED ORAL EVERY EVENING
Qty: 90 TABLET | Refills: 3 | Status: SHIPPED | OUTPATIENT
Start: 2023-06-07 | End: 2024-02-12 | Stop reason: SDUPTHER

## 2023-11-02 ENCOUNTER — PATIENT MESSAGE (OUTPATIENT)
Dept: MEDICAL GROUP | Facility: LAB | Age: 73
End: 2023-11-02
Payer: MEDICARE

## 2023-11-02 DIAGNOSIS — M1A.09X0 IDIOPATHIC CHRONIC GOUT OF MULTIPLE SITES WITHOUT TOPHUS: ICD-10-CM

## 2023-11-03 RX ORDER — COLCHICINE 0.6 MG/1
TABLET ORAL
Qty: 3 TABLET | Refills: 2 | Status: SHIPPED | OUTPATIENT
Start: 2023-11-03 | End: 2023-11-06

## 2023-11-05 DIAGNOSIS — M1A.09X0 IDIOPATHIC CHRONIC GOUT OF MULTIPLE SITES WITHOUT TOPHUS: ICD-10-CM

## 2023-11-06 RX ORDER — COLCHICINE 0.6 MG/1
TABLET ORAL
Qty: 10 TABLET | Refills: 2 | Status: SHIPPED | OUTPATIENT
Start: 2023-11-06 | End: 2024-02-12 | Stop reason: SDUPTHER

## 2023-11-06 NOTE — TELEPHONE ENCOUNTER
Yes.  Patient should remain on both.  Colchicine is only used for short, limited amount of time thus decreasing risk of myopathy.  Please make sure he continues with colchicine for a current acute gout attack

## 2023-11-06 NOTE — TELEPHONE ENCOUNTER
Patient message:   Concurrent use of Colchicine and Atorvastatin may result in an increased risk of myopathy/ rhabdomyolysis. Should patient remain on both?  Please respond with appropriate changes or comment to Pharmacy.   Concurrent use of Colchicine and Atorvastatin may result in an increased risk of myopathy/ rhabdomyolysis. Should patient remain on both?  Please respond with appropriate changes or comment to Pharmacy.

## 2023-11-15 DIAGNOSIS — F33.1 DEPRESSION, MAJOR, RECURRENT, MODERATE (HCC): ICD-10-CM

## 2023-11-16 RX ORDER — TRAZODONE HYDROCHLORIDE 50 MG/1
TABLET ORAL
Qty: 90 TABLET | Refills: 2 | Status: SHIPPED | OUTPATIENT
Start: 2023-11-16 | End: 2024-02-12 | Stop reason: SDUPTHER

## 2023-11-16 NOTE — TELEPHONE ENCOUNTER
Received request via: Pharmacy    Was the patient seen in the last year in this department? Yes LOV: 4/24/23    Does the patient have an active prescription (recently filled or refills available) for medication(s) requested? No Last Refill: 1/18/23    Does the patient have snf Plus and need 100 day supply (blood pressure, diabetes and cholesterol meds only)? Patient does not have SCP

## 2024-02-12 DIAGNOSIS — M1A.09X0 IDIOPATHIC CHRONIC GOUT OF MULTIPLE SITES WITHOUT TOPHUS: ICD-10-CM

## 2024-02-12 DIAGNOSIS — I10 ESSENTIAL HYPERTENSION: ICD-10-CM

## 2024-02-12 DIAGNOSIS — E78.2 MIXED HYPERLIPIDEMIA: ICD-10-CM

## 2024-02-12 DIAGNOSIS — F33.1 DEPRESSION, MAJOR, RECURRENT, MODERATE (HCC): ICD-10-CM

## 2024-02-12 NOTE — TELEPHONE ENCOUNTER
1. Caller Name: Prieto Holm                          Call Back Number: [phone] (home)         How would the patient prefer to be contacted with a response: Phone call do NOT leave a detailed message    Patient called and LVM requesting to update pharmacy. Requesting refills.

## 2024-02-13 RX ORDER — TRAZODONE HYDROCHLORIDE 50 MG/1
TABLET ORAL
Qty: 90 TABLET | Refills: 2 | Status: SHIPPED | OUTPATIENT
Start: 2024-02-13 | End: 2025-02-06

## 2024-02-13 RX ORDER — COLCHICINE 0.6 MG/1
TABLET ORAL
Qty: 10 TABLET | Refills: 2 | Status: SHIPPED | OUTPATIENT
Start: 2024-02-13 | End: 2024-08-12

## 2024-02-13 RX ORDER — ATORVASTATIN CALCIUM 40 MG/1
40 TABLET, FILM COATED ORAL EVERY EVENING
Qty: 90 TABLET | Refills: 3 | Status: SHIPPED | OUTPATIENT
Start: 2024-02-13 | End: 2025-02-07

## 2024-02-13 RX ORDER — LISINOPRIL 20 MG/1
20 TABLET ORAL DAILY
Qty: 90 TABLET | Refills: 3 | Status: SHIPPED | OUTPATIENT
Start: 2024-02-13 | End: 2025-02-07

## 2024-10-22 NOTE — PROGRESS NOTES
Subjective:   Prieto Holm is a 70 y.o. male here today for   Chief Complaint   Patient presents with   • Arthritis   • Skin Discoloration     Skin check      #OA knee:  -s/p corticosteroid injection of left knee on 5/10/21. Patient states that since then his knee has been feeling improved. He is able to get out and complete more of his daily activities he normally enjoys. Because of this he has also been feeling much happier. He states he continues to have minor pain in left knee with overuse but overall is very happy about his current status.     #Depression  -Has recently switched from Wellbutrin to Effexor due to side effects. Patient states that he stopped the Effexor after 3-4 days given side effects of nausea, fatigue, abdominal cramping and diarrhea.  He states that since his knee is feeling better, he has noted that his depression is less severe. He denies any SI/HI. He is not interested in starting any new medications at this time.   -Patient is continuing to use trazodone from sleep, would like to continue at this time.     #Hypertension   -Currently treating with lisinopril. Compliant with mediations. No real diet/exercise regimen. No checking blood pressures regularly. Asymptomatic.     #Elevated hemoglobin A1c:-Seen on previous labs. Patient not working on any diet, exercise regimen. Is asymptomatic at this time. Here today to request lab work to recheck A1c.    #Health maintenance:  -Patient is due for lab work including hepatitis C screening, PSA, lipid panel. Will complete at this time.      No Known Allergies      Current medicines (including changes today)  Current Outpatient Medications   Medication Sig Dispense Refill   • venlafaxine XR (EFFEXOR XR) 37.5 MG CAPSULE SR 24 HR Take 1 capsule by mouth every day. 30 capsule 3   • atorvastatin (LIPITOR) 40 MG Tab TAKE 1 TABLET EVERY EVENING 90 tablet 2   • HYDROcodone-acetaminophen (NORCO) 5-325 MG Tab per tablet        No current  [FreeTextEntry1] : She most likely has irritable bowel syndrome since she has not responded to omeprazole, however, we must also rule out peptic ulcer disease or gastritis  SUJATHA BLANCAS was advised to undergo endoscopy to which she agreed. The procedure will be performed in [facility] with the assistance of an anesthesiologist. She was given a booklet distributed by the [employer] explaining the procedure in detail and she understood the risks of the procedure not limited to infection, bleeding, perforation or non- diagnosis of gastric or esophageal cancer.  She was advised that she could not drive home, if she chooses to receive sedation. Further diagnostic and treatment recommendations will be based upon the procedure and any biopsies, if they are taken. Thank you for allowing me to participate in this [facility] health care.   I spent 45 minutes with the patient and answered all of her question "facility-administered medications for this visit.     He  has a past medical history of Bladder cancer (HCC), Cancer (HCC), Closed right ankle fracture, Depression, Generalized anxiety disorder (3/31/2020), Gout, Hyperlipidemia, Hypertension, and Lip injury. He also has no past medical history of Psychosis (HCC), Self-injurious behavior, or Suicide attempt (HCC).    ROS   Review of Systems   Constitutional: Negative for chills and fever.   HENT: Negative for hearing loss.    Eyes: Negative for blurred vision.   Respiratory: Negative for shortness of breath and wheezing.    Cardiovascular: Negative for chest pain and palpitations.   Gastrointestinal: Negative for abdominal pain, diarrhea, nausea and vomiting.   Neurological: Negative for dizziness and headaches.   Psychiatric/Behavioral: Negative for depression. The patient is not nervous/anxious.       Objective:     Physical Exam:  /78   Pulse 61   Temp 36.4 °C (97.6 °F) (Temporal)   Resp 13   Ht 1.829 m (6' 0.01\")   Wt 83.5 kg (184 lb)   SpO2 95%  Body mass index is 24.95 kg/m².   Constitutional: Alert, no distress.  Skin: Warm, dry, good turgor, no rashes in visible areas.  Eye: Equal, round and reactive, conjunctiva clear, lids normal.  Respiratory: Unlabored respiratory effort, lungs clear to auscultation, no wheezes, no rhonchi.  Cardiovascular: Normal S1, S2, no murmur, no edema.  Abdomen: Soft, non-tender, no masses, no hepatosplenomegaly.  Psych: Alert and oriented x3, normal affect and mood.    Assessment and Plan:     1. Primary osteoarthritis of left knee  -Status: Stable. Pain is well controlled at this point; however, I imagine pain will return. Discuss further treatment modalities including repeat corticosteroid injections every 6 months as needed. Also discussed the possibility of discussing with orthopedist a possible replacement. At this time patient will continue watchful monitoring. He will let me know regarding any other treatment he " pursues.    2. Depression, major, recurrent, moderate (HCC)  -Stable. Patient is asymptomatic at this time. We will continue with watchful observation. We will continue use trazodone for sleep. Return precautions given, patient will follow-up as needed.  - CBC WITHOUT DIFFERENTIAL; Future  - TSH WITH REFLEX TO FT4; Future  - traZODone (DESYREL) 50 MG Tab; Take 1 tablet by mouth at bedtime as needed for Sleep for up to 90 days.  Dispense: 30 tablet; Refill: 2    3. Essential hypertension  -Stable. We will continue treat with lisinopril 20 mg daily. Continue with appropriate diet, exercise regimen.  - Comp Metabolic Panel; Future  - lisinopril (PRINIVIL) 20 MG Tab; TAKE 1 TABLET DAILY  Dispense: 90 tablet; Refill: 3    4. Elevated hemoglobin A1c  -Stable. We will recheck labs at this time. Recommend continuation of appropriate diet, exercise regimen.  - HEMOGLOBIN A1C; Future    5. Encounter for screening for malignant neoplasm of prostate  - PROSTATE SPECIFIC AG SCREENING; Future    6. Need for hepatitis C screening test  - HEP C VIRUS ANTIBODY; Future    7. Screening for cardiovascular condition  - Lipid Profile; Future      Followup: Return if symptoms worsen or fail to improve.         PLEASE NOTE: This dictation was created using voice recognition software. I have made every reasonable attempt to correct obvious errors, but I expect that there are errors of grammar and possibly content that I did not discover before finalizing the note.

## 2024-11-25 SDOH — HEALTH STABILITY: PHYSICAL HEALTH: ON AVERAGE, HOW MANY MINUTES DO YOU ENGAGE IN EXERCISE AT THIS LEVEL?: 30 MIN

## 2024-11-25 SDOH — ECONOMIC STABILITY: TRANSPORTATION INSECURITY
IN THE PAST 12 MONTHS, HAS LACK OF RELIABLE TRANSPORTATION KEPT YOU FROM MEDICAL APPOINTMENTS, MEETINGS, WORK OR FROM GETTING THINGS NEEDED FOR DAILY LIVING?: NO

## 2024-11-25 SDOH — HEALTH STABILITY: MENTAL HEALTH
STRESS IS WHEN SOMEONE FEELS TENSE, NERVOUS, ANXIOUS, OR CAN'T SLEEP AT NIGHT BECAUSE THEIR MIND IS TROUBLED. HOW STRESSED ARE YOU?: TO SOME EXTENT

## 2024-11-25 SDOH — ECONOMIC STABILITY: INCOME INSECURITY: IN THE LAST 12 MONTHS, WAS THERE A TIME WHEN YOU WERE NOT ABLE TO PAY THE MORTGAGE OR RENT ON TIME?: NO

## 2024-11-25 SDOH — ECONOMIC STABILITY: FOOD INSECURITY: WITHIN THE PAST 12 MONTHS, THE FOOD YOU BOUGHT JUST DIDN'T LAST AND YOU DIDN'T HAVE MONEY TO GET MORE.: NEVER TRUE

## 2024-11-25 SDOH — ECONOMIC STABILITY: INCOME INSECURITY: HOW HARD IS IT FOR YOU TO PAY FOR THE VERY BASICS LIKE FOOD, HOUSING, MEDICAL CARE, AND HEATING?: NOT VERY HARD

## 2024-11-25 SDOH — ECONOMIC STABILITY: TRANSPORTATION INSECURITY
IN THE PAST 12 MONTHS, HAS THE LACK OF TRANSPORTATION KEPT YOU FROM MEDICAL APPOINTMENTS OR FROM GETTING MEDICATIONS?: NO

## 2024-11-25 SDOH — HEALTH STABILITY: PHYSICAL HEALTH: ON AVERAGE, HOW MANY DAYS PER WEEK DO YOU ENGAGE IN MODERATE TO STRENUOUS EXERCISE (LIKE A BRISK WALK)?: 2 DAYS

## 2024-11-25 SDOH — ECONOMIC STABILITY: FOOD INSECURITY: WITHIN THE PAST 12 MONTHS, YOU WORRIED THAT YOUR FOOD WOULD RUN OUT BEFORE YOU GOT MONEY TO BUY MORE.: NEVER TRUE

## 2024-11-25 SDOH — ECONOMIC STABILITY: TRANSPORTATION INSECURITY
IN THE PAST 12 MONTHS, HAS LACK OF TRANSPORTATION KEPT YOU FROM MEETINGS, WORK, OR FROM GETTING THINGS NEEDED FOR DAILY LIVING?: NO

## 2024-11-25 SDOH — ECONOMIC STABILITY: HOUSING INSECURITY
IN THE LAST 12 MONTHS, WAS THERE A TIME WHEN YOU DID NOT HAVE A STEADY PLACE TO SLEEP OR SLEPT IN A SHELTER (INCLUDING NOW)?: NO

## 2024-11-25 ASSESSMENT — SOCIAL DETERMINANTS OF HEALTH (SDOH)
HOW MANY DRINKS CONTAINING ALCOHOL DO YOU HAVE ON A TYPICAL DAY WHEN YOU ARE DRINKING: 1 OR 2
HOW OFTEN DO YOU HAVE A DRINK CONTAINING ALCOHOL: 2-3 TIMES A WEEK
HOW HARD IS IT FOR YOU TO PAY FOR THE VERY BASICS LIKE FOOD, HOUSING, MEDICAL CARE, AND HEATING?: NOT VERY HARD
HOW OFTEN DO YOU ATTEND CHURCH OR RELIGIOUS SERVICES?: MORE THAN 4 TIMES PER YEAR
IN THE PAST 12 MONTHS, HAS THE ELECTRIC, GAS, OIL, OR WATER COMPANY THREATENED TO SHUT OFF SERVICE IN YOUR HOME?: NO
DO YOU BELONG TO ANY CLUBS OR ORGANIZATIONS SUCH AS CHURCH GROUPS UNIONS, FRATERNAL OR ATHLETIC GROUPS, OR SCHOOL GROUPS?: NO
HOW OFTEN DO YOU ATTEND CHURCH OR RELIGIOUS SERVICES?: MORE THAN 4 TIMES PER YEAR
HOW OFTEN DO YOU ATTENT MEETINGS OF THE CLUB OR ORGANIZATION YOU BELONG TO?: NEVER
WITHIN THE PAST 12 MONTHS, YOU WORRIED THAT YOUR FOOD WOULD RUN OUT BEFORE YOU GOT THE MONEY TO BUY MORE: NEVER TRUE
HOW OFTEN DO YOU HAVE SIX OR MORE DRINKS ON ONE OCCASION: NEVER
IN A TYPICAL WEEK, HOW MANY TIMES DO YOU TALK ON THE PHONE WITH FAMILY, FRIENDS, OR NEIGHBORS?: MORE THAN THREE TIMES A WEEK
HOW OFTEN DO YOU ATTENT MEETINGS OF THE CLUB OR ORGANIZATION YOU BELONG TO?: NEVER
HOW OFTEN DO YOU GET TOGETHER WITH FRIENDS OR RELATIVES?: THREE TIMES A WEEK
DO YOU BELONG TO ANY CLUBS OR ORGANIZATIONS SUCH AS CHURCH GROUPS UNIONS, FRATERNAL OR ATHLETIC GROUPS, OR SCHOOL GROUPS?: NO
HOW OFTEN DO YOU GET TOGETHER WITH FRIENDS OR RELATIVES?: THREE TIMES A WEEK
IN A TYPICAL WEEK, HOW MANY TIMES DO YOU TALK ON THE PHONE WITH FAMILY, FRIENDS, OR NEIGHBORS?: MORE THAN THREE TIMES A WEEK

## 2024-11-25 ASSESSMENT — LIFESTYLE VARIABLES
AUDIT-C TOTAL SCORE: 3
SKIP TO QUESTIONS 9-10: 1
HOW OFTEN DO YOU HAVE A DRINK CONTAINING ALCOHOL: 2-3 TIMES A WEEK
HOW OFTEN DO YOU HAVE SIX OR MORE DRINKS ON ONE OCCASION: NEVER
HOW MANY STANDARD DRINKS CONTAINING ALCOHOL DO YOU HAVE ON A TYPICAL DAY: 1 OR 2

## 2024-11-27 ENCOUNTER — OFFICE VISIT (OUTPATIENT)
Dept: MEDICAL GROUP | Facility: LAB | Age: 74
End: 2024-11-27
Payer: COMMERCIAL

## 2024-11-27 VITALS
BODY MASS INDEX: 23.43 KG/M2 | DIASTOLIC BLOOD PRESSURE: 72 MMHG | RESPIRATION RATE: 15 BRPM | OXYGEN SATURATION: 97 % | HEIGHT: 72 IN | SYSTOLIC BLOOD PRESSURE: 110 MMHG | HEART RATE: 69 BPM | WEIGHT: 173 LBS | TEMPERATURE: 97.9 F

## 2024-11-27 DIAGNOSIS — G89.29 CHRONIC PAIN OF RIGHT ANKLE: ICD-10-CM

## 2024-11-27 DIAGNOSIS — Z12.5 ENCOUNTER FOR SCREENING FOR MALIGNANT NEOPLASM OF PROSTATE: ICD-10-CM

## 2024-11-27 DIAGNOSIS — Z00.00 ANNUAL PHYSICAL EXAM: ICD-10-CM

## 2024-11-27 DIAGNOSIS — Z13.0 SCREENING FOR DEFICIENCY ANEMIA: ICD-10-CM

## 2024-11-27 DIAGNOSIS — E78.2 MIXED HYPERLIPIDEMIA: ICD-10-CM

## 2024-11-27 DIAGNOSIS — M25.571 CHRONIC PAIN OF RIGHT ANKLE: ICD-10-CM

## 2024-11-27 DIAGNOSIS — I10 PRIMARY HYPERTENSION: ICD-10-CM

## 2024-11-27 DIAGNOSIS — F51.01 PRIMARY INSOMNIA: ICD-10-CM

## 2024-11-27 DIAGNOSIS — F43.21 ADJUSTMENT DISORDER WITH DEPRESSED MOOD: ICD-10-CM

## 2024-11-27 PROBLEM — Z63.0 PARTNER RELATIONSHIP PROBLEM: Status: RESOLVED | Noted: 2018-05-16 | Resolved: 2024-11-27

## 2024-11-27 PROBLEM — R19.5 POSITIVE OCCULT STOOL BLOOD TEST: Status: RESOLVED | Noted: 2018-11-01 | Resolved: 2024-11-27

## 2024-11-27 PROBLEM — F34.1 DYSTHYMIA: Status: RESOLVED | Noted: 2018-05-16 | Resolved: 2024-11-27

## 2024-11-27 PROBLEM — F33.1 DEPRESSION, MAJOR, RECURRENT, MODERATE (HCC): Status: RESOLVED | Noted: 2018-05-23 | Resolved: 2024-11-27

## 2024-11-27 PROBLEM — N43.3 HYDROCELE: Status: RESOLVED | Noted: 2021-11-22 | Resolved: 2024-11-27

## 2024-11-27 PROBLEM — I86.1 VARICOCELE: Status: RESOLVED | Noted: 2021-11-22 | Resolved: 2024-11-27

## 2024-11-27 PROBLEM — N43.3 HYDROCELE IN ADULT: Status: ACTIVE | Noted: 2024-11-27

## 2024-11-27 PROBLEM — F51.05 INSOMNIA SECONDARY TO SITUATIONAL DEPRESSION: Status: RESOLVED | Noted: 2018-05-25 | Resolved: 2024-11-27

## 2024-11-27 PROBLEM — R19.7 DIARRHEA: Status: RESOLVED | Noted: 2018-10-22 | Resolved: 2024-11-27

## 2024-11-27 PROBLEM — L98.8: Status: RESOLVED | Noted: 2018-01-11 | Resolved: 2024-11-27

## 2024-11-27 RX ORDER — ESCITALOPRAM OXALATE 10 MG/1
10 TABLET ORAL DAILY
Qty: 90 TABLET | Refills: 3 | Status: SHIPPED | OUTPATIENT
Start: 2024-11-27

## 2024-11-27 ASSESSMENT — ANXIETY QUESTIONNAIRES
GAD7 TOTAL SCORE: 11
3. WORRYING TOO MUCH ABOUT DIFFERENT THINGS: MORE THAN HALF THE DAYS
6. BECOMING EASILY ANNOYED OR IRRITABLE: NEARLY EVERY DAY
2. NOT BEING ABLE TO STOP OR CONTROL WORRYING: SEVERAL DAYS
7. FEELING AFRAID AS IF SOMETHING AWFUL MIGHT HAPPEN: NOT AT ALL
IF YOU CHECKED OFF ANY PROBLEMS ON THIS QUESTIONNAIRE, HOW DIFFICULT HAVE THESE PROBLEMS MADE IT FOR YOU TO DO YOUR WORK, TAKE CARE OF THINGS AT HOME, OR GET ALONG WITH OTHER PEOPLE: SOMEWHAT DIFFICULT
1. FEELING NERVOUS, ANXIOUS, OR ON EDGE: MORE THAN HALF THE DAYS
5. BEING SO RESTLESS THAT IT IS HARD TO SIT STILL: NOT AT ALL
4. TROUBLE RELAXING: NEARLY EVERY DAY

## 2024-11-27 ASSESSMENT — PATIENT HEALTH QUESTIONNAIRE - PHQ9
SUM OF ALL RESPONSES TO PHQ9 QUESTIONS 1 AND 2: 4
3. TROUBLE FALLING OR STAYING ASLEEP OR SLEEPING TOO MUCH: NOT AT ALL
9. THOUGHTS THAT YOU WOULD BE BETTER OFF DEAD, OR OF HURTING YOURSELF: SEVERAL DAYS
4. FEELING TIRED OR HAVING LITTLE ENERGY: MORE THAN HALF THE DAYS
7. TROUBLE CONCENTRATING ON THINGS, SUCH AS READING THE NEWSPAPER OR WATCHING TELEVISION: SEVERAL DAYS
2. FEELING DOWN, DEPRESSED, IRRITABLE, OR HOPELESS: MORE THAN HALF THE DAYS
CLINICAL INTERPRETATION OF PHQ2 SCORE: 4
5. POOR APPETITE OR OVEREATING: SEVERAL DAYS
SUM OF ALL RESPONSES TO PHQ QUESTIONS 1-9: 10
SUM OF ALL RESPONSES TO PHQ QUESTIONS 1-9: 10
6. FEELING BAD ABOUT YOURSELF - OR THAT YOU ARE A FAILURE OR HAVE LET YOURSELF OR YOUR FAMILY DOWN: SEVERAL DAYS
1. LITTLE INTEREST OR PLEASURE IN DOING THINGS: MORE THAN HALF THE DAYS
8. MOVING OR SPEAKING SO SLOWLY THAT OTHER PEOPLE COULD HAVE NOTICED. OR THE OPPOSITE, BEING SO FIGETY OR RESTLESS THAT YOU HAVE BEEN MOVING AROUND A LOT MORE THAN USUAL: NOT AT ALL
5. POOR APPETITE OR OVEREATING: 2 - MORE THAN HALF THE DAYS

## 2024-11-27 ASSESSMENT — FIBROSIS 4 INDEX: FIB4 SCORE: 1.23

## 2024-11-27 ASSESSMENT — ACTIVITIES OF DAILY LIVING (ADL): BATHING_REQUIRES_ASSISTANCE: 0

## 2024-11-27 ASSESSMENT — ENCOUNTER SYMPTOMS: GENERAL WELL-BEING: FAIR

## 2024-11-27 NOTE — PROGRESS NOTES
No chief complaint on file.    HPI:  Prieto Holm is a 74 y.o. here for Medicare Annual Wellness Visit    Patient Active Problem List    Diagnosis Date Noted    Hydrocele in adult 11/27/2024    Generalized anxiety disorder 03/31/2020    Bladder cancer (HCC) 08/07/2019    Adjustment disorder with depressed mood 05/25/2018    Impaired fasting glucose 01/19/2017    History of bladder cancer 10/18/2016    Primary insomnia 10/18/2016    Gout     Hyperlipidemia     Hypertension        Current Outpatient Medications   Medication Sig Dispense Refill    atorvastatin (LIPITOR) 40 MG Tab Take 1 Tablet by mouth every evening for 360 days. 90 Tablet 3    lisinopril (PRINIVIL) 20 MG Tab Take 1 Tablet by mouth every day for 360 days. 90 Tablet 3    traZODone (DESYREL) 50 MG Tab TAKE 1 TABLET AT BEDTIME ASNEEDED FOR SLEEP FOR UP  DAYS 90 Tablet 2     No current facility-administered medications for this visit.          Current supplements as per medication list.   3  Allergies: Patient has no known allergies.    Current social contact/activities: No concerns      He  reports that he is a non-smoker but has been exposed to tobacco smoke. The exposure started about 49 years ago. He has been exposed to an average of 1 pack per day for the past 35 years. He has never used smokeless tobacco. He reports current alcohol use of about 1.2 oz of alcohol per week. He reports that he does not use drugs.  Counseling given: Not Answered      ROS:    Gait: Uses no assistive device  Ostomy: No  Other tubes: No  Amputations: No  Chronic oxygen use: No  Last eye exam: being Followed by ophthalmology   Wears hearing aids: No   : Reports urinary leakage during the last 6 months that has interfered a lot with their daily activities or sleep.    Screening:    Depression Screening  Little interest or pleasure in doing things?  2 - more than half the days  Feeling down, depressed , or hopeless? 2 - more than half the days  Trouble falling  or staying asleep, or sleeping too much?  0 - not at all  Feeling tired or having little energy?  2 - more than half the days  Poor appetite or overeating?  2 - more than half the days  Feeling bad about yourself - or that you are a failure or have let yourself or your family down? 1 - several days  Trouble concentrating on things, such as reading the newspaper or watching television? 0 - not at all  Moving or speaking so slowly that other people could have noticed.  Or the opposite - being so fidgety or restless that you have been moving around a lot more than usual?  0 - not at all  Thoughts that you would be better off dead, or of hurting yourself?  1 - several days  Patient Health Questionnaire Score: 10    If depressive symptoms identified deferred to follow up visit unless specifically addressed in assessment and plan.    Screening for Cognitive Impairment  Do you or any of your friends or family members have any concern about your memory? No  Three Minute Recall (Leader, Season, Table) 3/3    Eric clock face with all 12 numbers and set the hands to show 10 minutes after 11.  Yes    Cognitive concerns identified deferred for follow up unless specifically addressed in assessment and plan.    Fall Risk Assessment  Has the patient had two or more falls in the last year or any fall with injury in the last year?  No    Safety Assessment  Do you always wear your seatbelt?  Yes  Any changes to home needed to function safely? No  Difficulty hearing.  No  Patient counseled about all safety risks that were identified.    Functional Assessment ADLs  Are there any barriers preventing you from cooking for yourself or meeting nutritional needs?  No.    Are there any barriers preventing you from driving safely or obtaining transportation?  No.    Are there any barriers preventing you from using a telephone or calling for help?  No    Are there any barriers preventing you from shopping?  No.    Are there any barriers  preventing you from taking care of your own finances?  No    Are there any barriers preventing you from managing your medications?  No    Are there any barriers preventing you from showering, bathing or dressing yourself? No    Are there any barriers preventing you from doing housework or laundry? No    Are there any barriers preventing you from using the toilet?No    Are you currently engaging in any exercise or physical activity?  Yes.      Self-Assessment of Health  What is your perception of your health? Fair    Do you sleep more than six hours a night? No    In the past 7 days, how much did pain keep you from doing your normal work? Some    Do you spend quality time with family or friends (virtually or in person)? No    Do you usually eat a heart healthy diet that constists of a variety of fruits, vegetables, whole grains and fiber? No    Do you eat foods high in fat and/or Fast Food more than three times per week? No    How concerned are you that your medical conditions are not being well managed? Not at all    Are you worried that in the next 2 months, you may not have stable housing that you own, rent, or stay in as part of a household? No        Advance Care Planning  Do you have an Advance Directive, Living Will, Durable Power of , or POLST? No               Health Maintenance Summary            Overdue - Zoster (Shingles) Vaccines (1 of 2) Never done      No completion history exists for this topic.              Overdue - Lung Cancer Screening Shared Decision Making (Once) Never done      No completion history exists for this topic.              Overdue - Abdominal Aortic Aneurysm (AAA) Screening (Once) Never done      No completion history exists for this topic.              Overdue - Annual Wellness Visit (Yearly) Overdue since 11/15/2018      11/15/2017  Visit Dx: Medicare annual wellness visit, initial              Overdue - Influenza Vaccine (1) Overdue since 9/1/2024      10/16/2023   Outside Immunization: Fluzone High-Dose Quad    10/31/2022  Imm Admin: Influenza Vaccine Adult HD    10/19/2021  Imm Admin: Influenza Vaccine Adult HD    09/22/2020  Imm Admin: Influenza Vaccine Adult HD    10/18/2019  Imm Admin: Influenza Vaccine Adult HD    Only the first 5 history entries have been loaded, but more history exists.              Overdue - IMM DTaP/Tdap/Td Vaccine (2 - Td or Tdap) Overdue since 11/18/2024 11/18/2014  Imm Admin: Tdap Vaccine              Colorectal Cancer Screening (Colonoscopy - Every 10 Years) Tentatively due on 5/22/2029 05/22/2019  REFERRAL TO GI FOR COLONOSCOPY    10/22/2018  COLOGUARD (FIT DNA)              Pneumococcal Vaccine: 65+ Years (Series Information) Completed      10/18/2019  Imm Admin: Pneumococcal polysaccharide vaccine (PPSV-23)    11/15/2017  Imm Admin: Pneumococcal Conjugate Vaccine (Prevnar/PCV-13)              Hepatitis C Screening  Tentatively Complete      07/27/2021  Hepatitis C Antibody component of HEP C VIRUS ANTIBODY              COVID-19 Vaccine (Series Information) Completed      10/20/2024  Imm Admin: Covid-19 Mrna (Spikevax) Moderna 12+ Years    11/14/2023  Imm Admin: COVID-19, mRNA, LNP-S, PF, tyesha-sucrose, 30 mcg/0.3 mL    12/19/2022  Outside Immunization: COVID mRNA Bivalent(MOD)    07/17/2022  Imm Admin: MODERNA SARS-COV-2 VACCINE (12+)    03/27/2021  Imm Admin: MODERNA SARS-COV-2 VACCINE (12+)    Only the first 5 history entries have been loaded, but more history exists.              Hepatitis A Vaccine (Hep A) (Series Information) Aged Out      No completion history exists for this topic.              Hepatitis B Vaccine (Hep B) (Series Information) Aged Out      No completion history exists for this topic.              HPV Vaccines (Series Information) Aged Out      No completion history exists for this topic.              Polio Vaccine (Inactivated Polio) (Series Information) Aged Out      No completion history exists for this  topic.              Meningococcal Immunization (Series Information) Aged Out      No completion history exists for this topic.              Discontinued - A1c Screening  Discontinued        Frequency changed to Never automatically (Topic No Longer Applies)    07/27/2021  HEMOGLOBIN A1C    07/08/2020  HEMOGLOBIN A1C    05/08/2019  HEMOGLOBIN A1C    09/19/2018  HEMOGLOBIN A1C    Only the first 5 history entries have been loaded, but more history exists.              Discontinued - Fasting Lipid Profile  Discontinued        Frequency changed to Never automatically (Topic No Longer Applies)    01/31/2023  Lipid Profile    07/27/2021  Lipid Profile    03/30/2020  Lipid Profile    05/08/2019  Lipid Profile    Only the first 5 history entries have been loaded, but more history exists.              Discontinued - SERUM CREATININE  Discontinued        Frequency changed to Never automatically (Topic No Longer Applies)    01/31/2023  Comp Metabolic Panel    07/27/2021  Comp Metabolic Panel    07/08/2020  Comp Metabolic Panel    08/07/2019  COMP METABOLIC PANEL    Only the first 5 history entries have been loaded, but more history exists.                    Patient Care Team:  Eliseo Ramsay M.D. as PCP - General (Family Medicine)  Giuseppe Ramirez M.D. as Consulting Physician (Ophthalmology)  Giuseppe Mejia M.D. (Dermatology)      Social History     Tobacco Use    Smoking status: Passive Smoke Exposure - Never Smoker    Smokeless tobacco: Never   Vaping Use    Vaping status: Never Used   Substance Use Topics    Alcohol use: Yes     Alcohol/week: 1.2 oz     Types: 2 Cans of beer per week    Drug use: No     Family History   Problem Relation Age of Onset    Thyroid Mother     Dementia Mother     Alzheimer's Disease Father     Dementia Father     Other Other         autism spectrum disorder     He  has a past medical history of Bladder cancer (HCC), Cancer (HCC), Closed right ankle fracture, Depression, Generalized  "anxiety disorder (3/31/2020), Gout, Hyperlipidemia, Hypertension, and Lip injury.    He has no past medical history of Psychosis (HCC), Self-injurious behavior, or Suicide attempt ([facility]).   Past Surgical History:   Procedure Laterality Date    EXTERNAL FIXATOR APPLICATION Right 08/07/2019    Procedure: APPLICATION, EXTERNAL FIXATION DEVICE;  Surgeon: Prem Mcmahan M.D.;  Location: SURGERY [facility];  Service: Orthopedics    IRRIGATION & DEBRIDEMENT ORTHO Right 08/07/2019    Procedure: IRRIGATION AND DEBRIDEMENT, WOUND;  Surgeon: Prem Mcmahan M.D.;  Location: SURGERY [facility];  Service: Orthopedics    BLADDER BIOPSY WITH CYSTOSCOPY      bladder cancer excision    EYE SURGERY      HERNIA REPAIR      MENISCUS REPAIR      OPEN REDUCTION      OTHER ORTHOPEDIC SURGERY Left     elbow surgery     Exam:   /72   Pulse 69   Temp 36.6 °C (97.9 °F) (Temporal)   Resp 15   Ht 1.829 m (6' 0.01\")   Wt 78.5 kg (173 lb)   SpO2 97%  Body mass index is 23.46 kg/m².    Hearing excellent.    Dentition good  Alert, oriented in no acute distress.  Eye contact is good, speech goal directed, affect calm    Assessment and Plan. The following treatment and monitoring plan is recommended:      1. Annual physical exam  Reviewed labs, screens, vaccination with the patient.  Patient declines all vaccines at this time.  Will consider zoster vaccine in the future.  Patient is following up for colorectal cancer screening.  No other screenings needed at this time.  Routine anticipatory guidance given.  Did order labs as below.  Follow-up for annual wellness on a yearly basis.  - Subsequent Annual Wellness Visit - Includes PPPS ()    2. Primary hypertension  -This problem is chronic, stable, and monitored appropriately by history/labs/imaging as needed, and is well-controlled on the current regimen.  Please continue current regimen: Continue with lisinopril 20 mg daily.  Continue the proper diet and exercise " regiment.  - Comp Metabolic Panel; Future    3. Mixed hyperlipidemia  -This problem is chronic, stable, and monitored appropriately by history/labs/imaging as needed, and is well-controlled on the current regimen.  Please continue current regimen: Continue with atorvastatin 40 mg daily.  Continue with appropriate diet and exercise regimen.  - Lipid Profile; Future    4. Primary insomnia  -This problem is chronic, stable, and monitored appropriately by history/labs/imaging as needed, and is well-controlled on the current regimen.  Please continue current regimen: Continue with trazodone 25 mg nightly as needed.    5. Adjustment disorder with depressed mood  -Is a chronic ongoing condition that is unstable as patient has had significant return of symptoms of depression and new symptoms of anxiety.  While patient does have a history of adjustment disorder, symptoms are worse given the recent death of parents and the difficulty that this is caused between him and his brother.  I do recommend restarting treatment with Lexapro 10 mg daily refer to behavioral health for further cognitive behavioral therapy.  Patient will follow-up for medication check in 1 month.  - escitalopram (LEXAPRO) 10 MG Tab; Take 1 Tablet by mouth every day.  Dispense: 90 Tablet; Refill: 3  - Referral to Behavioral Health    6. Encounter for screening for malignant neoplasm of prostate  - PROSTATE SPECIFIC AG SCREENING; Future    7. Chronic pain of right ankle  -Chronic ongoing condition for last several years.  Patient states it feels like it is worsening the point where he is not having neuropathy and some weakness.  Will refer to orthopedics for further evaluation and treatment.  - Referral to Orthopedics    8. Screening for deficiency anemia  - CBC WITHOUT DIFFERENTIAL; Future        Services suggested: No services needed at this time  Health Care Screening: Age-appropriate preventive services recommended by USPTF and ACIP covered by Medicare  were discussed today. Services ordered if indicated and agreed upon by the patient.  Referrals offered: Community-based lifestyle interventions to reduce health risks and promote self-management and wellness, fall prevention, nutrition, physical activity, tobacco-use cessation, weight loss, and mental health services as per orders if indicated.    Discussion today about general wellness and lifestyle habits:    Prevent falls and reduce trip hazards; Cautioned about securing or removing rugs.  Have a working fire alarm and carbon monoxide detector;   Engage in regular physical activity and social activities     Follow-up: No follow-ups on file.

## 2024-12-12 ENCOUNTER — APPOINTMENT (OUTPATIENT)
Dept: LAB | Facility: MEDICAL CENTER | Age: 74
End: 2024-12-12
Attending: FAMILY MEDICINE
Payer: COMMERCIAL

## 2025-01-03 ENCOUNTER — HOSPITAL ENCOUNTER (OUTPATIENT)
Dept: LAB | Facility: MEDICAL CENTER | Age: 75
End: 2025-01-03
Attending: FAMILY MEDICINE
Payer: MEDICARE

## 2025-01-03 DIAGNOSIS — I10 PRIMARY HYPERTENSION: ICD-10-CM

## 2025-01-03 DIAGNOSIS — Z13.0 SCREENING FOR DEFICIENCY ANEMIA: ICD-10-CM

## 2025-01-03 DIAGNOSIS — Z12.5 ENCOUNTER FOR SCREENING FOR MALIGNANT NEOPLASM OF PROSTATE: ICD-10-CM

## 2025-01-03 DIAGNOSIS — E78.2 MIXED HYPERLIPIDEMIA: ICD-10-CM

## 2025-01-03 LAB
ERYTHROCYTE [DISTWIDTH] IN BLOOD BY AUTOMATED COUNT: 47.3 FL (ref 35.9–50)
HCT VFR BLD AUTO: 47.7 % (ref 42–52)
HGB BLD-MCNC: 15.6 G/DL (ref 14–18)
MCH RBC QN AUTO: 30.9 PG (ref 27–33)
MCHC RBC AUTO-ENTMCNC: 32.7 G/DL (ref 32.3–36.5)
MCV RBC AUTO: 94.5 FL (ref 81.4–97.8)
PLATELET # BLD AUTO: 195 K/UL (ref 164–446)
PMV BLD AUTO: 11.6 FL (ref 9–12.9)
RBC # BLD AUTO: 5.05 M/UL (ref 4.7–6.1)
WBC # BLD AUTO: 5.8 K/UL (ref 4.8–10.8)

## 2025-01-03 PROCEDURE — 80053 COMPREHEN METABOLIC PANEL: CPT

## 2025-01-03 PROCEDURE — 80061 LIPID PANEL: CPT

## 2025-01-03 PROCEDURE — 36415 COLL VENOUS BLD VENIPUNCTURE: CPT

## 2025-01-03 PROCEDURE — 85027 COMPLETE CBC AUTOMATED: CPT

## 2025-01-03 PROCEDURE — 84153 ASSAY OF PSA TOTAL: CPT | Mod: GA

## 2025-01-04 LAB
ALBUMIN SERPL BCP-MCNC: 4.3 G/DL (ref 3.2–4.9)
ALBUMIN/GLOB SERPL: 2.4 G/DL
ALP SERPL-CCNC: 65 U/L (ref 30–99)
ALT SERPL-CCNC: 23 U/L (ref 2–50)
ANION GAP SERPL CALC-SCNC: 10 MMOL/L (ref 7–16)
AST SERPL-CCNC: 24 U/L (ref 12–45)
BILIRUB SERPL-MCNC: 0.8 MG/DL (ref 0.1–1.5)
BUN SERPL-MCNC: 13 MG/DL (ref 8–22)
CALCIUM ALBUM COR SERPL-MCNC: 10.3 MG/DL (ref 8.5–10.5)
CALCIUM SERPL-MCNC: 10.5 MG/DL (ref 8.5–10.5)
CHLORIDE SERPL-SCNC: 105 MMOL/L (ref 96–112)
CHOLEST SERPL-MCNC: 164 MG/DL (ref 100–199)
CO2 SERPL-SCNC: 23 MMOL/L (ref 20–33)
CREAT SERPL-MCNC: 1 MG/DL (ref 0.5–1.4)
FASTING STATUS PATIENT QL REPORTED: NORMAL
GFR SERPLBLD CREATININE-BSD FMLA CKD-EPI: 79 ML/MIN/1.73 M 2
GLOBULIN SER CALC-MCNC: 1.8 G/DL (ref 1.9–3.5)
GLUCOSE SERPL-MCNC: 105 MG/DL (ref 65–99)
HDLC SERPL-MCNC: 54 MG/DL
LDLC SERPL CALC-MCNC: 86 MG/DL
POTASSIUM SERPL-SCNC: 4.8 MMOL/L (ref 3.6–5.5)
PROT SERPL-MCNC: 6.1 G/DL (ref 6–8.2)
PSA SERPL DL<=0.01 NG/ML-MCNC: 2.66 NG/ML (ref 0–4)
SODIUM SERPL-SCNC: 138 MMOL/L (ref 135–145)
TRIGL SERPL-MCNC: 120 MG/DL (ref 0–149)

## 2025-01-18 DIAGNOSIS — E78.2 MIXED HYPERLIPIDEMIA: ICD-10-CM

## 2025-01-20 RX ORDER — ATORVASTATIN CALCIUM 40 MG/1
40 TABLET, FILM COATED ORAL EVERY EVENING
Qty: 90 TABLET | Refills: 3 | Status: SHIPPED | OUTPATIENT
Start: 2025-01-20

## 2025-01-20 NOTE — TELEPHONE ENCOUNTER
Received request via: Pharmacy    Was the patient seen in the last year in this department? Yes    Does the patient have an active prescription (recently filled or refills available) for medication(s) requested? No    Pharmacy Name: [facility]    Does the patient have retirement Plus and need 100-day supply? (This applies to ALL medications) Patient does not have SCP

## 2025-03-05 DIAGNOSIS — I10 ESSENTIAL HYPERTENSION: ICD-10-CM

## 2025-03-06 RX ORDER — LISINOPRIL 20 MG/1
20 TABLET ORAL DAILY
Qty: 90 TABLET | Refills: 3 | Status: SHIPPED | OUTPATIENT
Start: 2025-03-06

## 2025-03-06 NOTE — TELEPHONE ENCOUNTER
Received request via: Pharmacy    Was the patient seen in the last year in this department? Yes    Does the patient have an active prescription (recently filled or refills available) for medication(s) requested? No    Pharmacy Name: [facility]    Does the patient have residential Plus and need 100-day supply? (This applies to ALL medications) Patient does not have SCP

## 2025-03-09 DIAGNOSIS — F33.1 DEPRESSION, MAJOR, RECURRENT, MODERATE (HCC): ICD-10-CM

## 2025-03-10 RX ORDER — TRAZODONE HYDROCHLORIDE 50 MG/1
50 TABLET ORAL
Qty: 90 TABLET | Refills: 0 | Status: SHIPPED | OUTPATIENT
Start: 2025-03-10

## 2025-03-11 DIAGNOSIS — M1A.09X0 IDIOPATHIC CHRONIC GOUT OF MULTIPLE SITES WITHOUT TOPHUS: ICD-10-CM

## 2025-03-11 RX ORDER — COLCHICINE 0.6 MG/1
TABLET ORAL
Qty: 10 TABLET | Refills: 2 | Status: SHIPPED | OUTPATIENT
Start: 2025-03-11 | End: 2025-09-08

## 2025-05-30 ENCOUNTER — TELEPHONE (OUTPATIENT)
Dept: MEDICAL GROUP | Facility: LAB | Age: 75
End: 2025-05-30
Payer: MEDICARE

## 2025-05-30 RX ORDER — PREDNISONE 20 MG/1
20 TABLET ORAL DAILY
Qty: 5 TABLET | Refills: 0 | Status: SHIPPED | OUTPATIENT
Start: 2025-05-30 | End: 2025-06-04

## 2025-05-30 NOTE — TELEPHONE ENCOUNTER
1. Caller Name: Prieto Holm                          Call Back Number: [phone]   Okay to leave my chart message and or phone call. Patient called reporting active Gout right knee pain. Last 3 days. Taking his Allopurinol and Colchicine currently.       Requesting an extra medication to better help,

## 2025-06-09 ENCOUNTER — PATIENT MESSAGE (OUTPATIENT)
Dept: MEDICAL GROUP | Facility: LAB | Age: 75
End: 2025-06-09
Payer: MEDICARE

## 2025-06-09 DIAGNOSIS — M1A.09X0 IDIOPATHIC CHRONIC GOUT OF MULTIPLE SITES WITHOUT TOPHUS: ICD-10-CM

## 2025-06-09 NOTE — TELEPHONE ENCOUNTER
Shoulder have gout flare up that soon after a course of steroids. Please have him come in to be seen. This could be something else. Thanks.

## 2025-06-09 NOTE — PATIENT COMMUNICATION
Patient called and LVM reporting left knee gout, Right knee is now all better after steroid course, [facility].   Swelling and warm, hard to bend. Hurts to bend.

## 2025-06-10 ENCOUNTER — APPOINTMENT (OUTPATIENT)
Dept: MEDICAL GROUP | Facility: LAB | Age: 75
End: 2025-06-10
Payer: MEDICARE

## 2025-06-10 ENCOUNTER — TELEPHONE (OUTPATIENT)
Dept: MEDICAL GROUP | Facility: LAB | Age: 75
End: 2025-06-10

## 2025-06-10 RX ORDER — COLCHICINE 0.6 MG/1
TABLET ORAL
Qty: 10 TABLET | Refills: 0 | Status: SHIPPED | OUTPATIENT
Start: 2025-06-10 | End: 2025-12-07

## 2025-06-10 NOTE — PROGRESS NOTES
"Virtual Visit: Established Patient   This visit was conducted via {Platform used:50614::\"Teams\"} using secure and encrypted videoconferencing technology.   The patient was {home or other private:40063::\"in their home\"} in the state of {State:[address]::\"[place]\"}.    The patient's identity was confirmed and verbal consent was obtained for this virtual visit.     Subjective:   CC: No chief complaint on file.    PCP Eliseo Ramsay M.D.    Prieto Holm is a 74 y.o. male presenting for evaluation and management of:    ***    ROS   ***    Current medicines (including changes today)  Current Medications[1]    Patient Active Problem List    Diagnosis Date Noted    Hydrocele in adult 11/27/2024    Generalized anxiety disorder 03/31/2020    Bladder cancer (HCC) 08/07/2019    Adjustment disorder with depressed mood 05/25/2018    Impaired fasting glucose 01/19/2017    History of bladder cancer 10/18/2016    Primary insomnia 10/18/2016    Gout     Hyperlipidemia     Hypertension         Objective:   There were no vitals taken for this visit.    Physical Exam:***  Constitutional: Alert, no distress, well-groomed.  Skin: No rashes in visible areas.  Eye: Round. Conjunctiva clear, lids normal. No icterus.   ENMT: Lips pink without lesions, good dentition, moist mucous membranes. Phonation normal.  Neck: No masses, no thyromegaly. Moves freely without pain.  Respiratory: Unlabored respiratory effort, no cough or audible wheeze  Psych: Alert and oriented x3, normal affect and mood.     Assessment and Plan:   The following treatment plan was discussed:     There are no diagnoses linked to this encounter.    Follow-up: No follow-ups on file.              [1]   Current Outpatient Medications   Medication Sig Dispense Refill    colchicine (COLCRYS) 0.6 MG Tab TAKE 2 TABLETS ONCE, THEN 1 TABLET 1 HOUR LATER AS     NEEDED FOR GOUT ATTACK 10 Tablet 2    traZODone (DESYREL) 50 MG Tab TAKE 1 TABLET BY MOUTH AT  BEDTIME AS NEEDED " FOR SLEEP 90 Tablet 0    lisinopril (PRINIVIL) 20 MG Tab TAKE 1 TABLET BY MOUTH DAILY 90 Tablet 3    atorvastatin (LIPITOR) 40 MG Tab TAKE 1 TABLET BY MOUTH EVERY  EVENING 90 Tablet 3    escitalopram (LEXAPRO) 10 MG Tab Take 1 Tablet by mouth every day. 90 Tablet 3     No current facility-administered medications for this visit.

## 2025-06-10 NOTE — PROGRESS NOTES
Verbal consent was acquired by the patient to use mPort ambient listening note generation during this visit Yes.    Subjective:     No chief complaint on file.      HPI:   PCP Eliseo Ramsay M.D. unavailable.    History of Present Illness      ROS    Objective:     Exam:  There were no vitals taken for this visit. There is no height or weight on file to calculate BMI.    Physical Exam    Reviewed most recent/relevant labs/imaging.    Assessment & Plan:     74 y.o. male with the following -     There are no diagnoses linked to this encounter.      I spent a total of *** minutes with record review, exam, communication with the patient, and documentation of this encounter.    No follow-ups on file.    Please note that this dictation was created using voice recognition software. I have made every reasonable attempt to correct obvious errors, but I expect that there are errors of grammar and possibly content that I did not discover before finalizing the note.

## 2025-06-10 NOTE — TELEPHONE ENCOUNTER
Patient was scheduled today for VV with Dr. Schwartz.     Spoke to patient and he said he couldn't log in, he didn't understand why he needed to come in. He just wants you to send his medication for Gout. He is leaving for Vacation next Sun and would like this resolved.

## 2025-07-07 ENCOUNTER — APPOINTMENT (OUTPATIENT)
Dept: MEDICAL GROUP | Facility: LAB | Age: 75
End: 2025-07-07
Payer: MEDICARE

## 2025-07-24 ENCOUNTER — OFFICE VISIT (OUTPATIENT)
Dept: MEDICAL GROUP | Facility: LAB | Age: 75
End: 2025-07-24
Payer: MEDICARE

## 2025-07-24 VITALS
HEART RATE: 68 BPM | DIASTOLIC BLOOD PRESSURE: 72 MMHG | SYSTOLIC BLOOD PRESSURE: 124 MMHG | OXYGEN SATURATION: 98 % | WEIGHT: 180 LBS | RESPIRATION RATE: 16 BRPM | HEIGHT: 72 IN | TEMPERATURE: 97.9 F | BODY MASS INDEX: 24.38 KG/M2

## 2025-07-24 DIAGNOSIS — G89.29 CHRONIC PAIN OF BOTH SHOULDERS: ICD-10-CM

## 2025-07-24 DIAGNOSIS — L98.9 LEG SKIN LESION, LEFT: Primary | ICD-10-CM

## 2025-07-24 DIAGNOSIS — F43.21 ADJUSTMENT DISORDER WITH DEPRESSED MOOD: ICD-10-CM

## 2025-07-24 DIAGNOSIS — M25.512 CHRONIC PAIN OF BOTH SHOULDERS: ICD-10-CM

## 2025-07-24 DIAGNOSIS — M25.511 CHRONIC PAIN OF BOTH SHOULDERS: ICD-10-CM

## 2025-07-24 PROCEDURE — 3078F DIAST BP <80 MM HG: CPT | Performed by: FAMILY MEDICINE

## 2025-07-24 PROCEDURE — 99214 OFFICE O/P EST MOD 30 MIN: CPT | Performed by: FAMILY MEDICINE

## 2025-07-24 PROCEDURE — 3074F SYST BP LT 130 MM HG: CPT | Performed by: FAMILY MEDICINE

## 2025-07-24 RX ORDER — ESCITALOPRAM OXALATE 20 MG/1
20 TABLET ORAL DAILY
Qty: 90 TABLET | Refills: 3 | Status: SHIPPED | OUTPATIENT
Start: 2025-07-24

## 2025-07-24 ASSESSMENT — FIBROSIS 4 INDEX: FIB4 SCORE: 1.87

## 2025-07-24 ASSESSMENT — PATIENT HEALTH QUESTIONNAIRE - PHQ9
CLINICAL INTERPRETATION OF PHQ2 SCORE: 2
SUM OF ALL RESPONSES TO PHQ QUESTIONS 1-9: 9
5. POOR APPETITE OR OVEREATING: 1 - SEVERAL DAYS

## 2025-07-24 NOTE — Clinical Note
REFERRAL APPROVAL NOTICE         Sent on July 24, 2025                   Alfredo Holm  [address]  [place] [address]                   Dear Mr. Holm,    After a careful review of the medical information and benefit coverage, [facility] has processed your referral. See below for additional details.    If applicable, you must be actively enrolled with your insurance for coverage of the authorized service. If you have any questions regarding your coverage, please contact your insurance directly.    REFERRAL INFORMATION   Referral #:  [id number]  Referred-To Department    Referred-By Provider:  Dermatology    Eliseo Ramsay M.D.   [facility]      [address]  [place] [address]  [phone] [address]  [place] [id number] [phone]    Referral Start Date:  07/24/2025  Referral End Date:   07/24/2026             SCHEDULING  If you do not already have an appointment, please call [phone] to make an appointment.     MORE INFORMATION  If you do not already have a [employer] account, sign up at: [email or web address]  You can access your medical information, make appointments, see lab results, billing information, and more.  If you have questions regarding this referral, please contact  the [facility] Referrals department at:             [phone]. Monday - Friday 8:00AM - 5:00PM.     Sincerely,    [facility]

## 2025-07-24 NOTE — PROGRESS NOTES
Verbal consent was acquired by the patient to use [employer] ambient listening note generation during this visit Yes    Subjective:   Prieto Holm is a 74 y.o. male here today for   No chief complaint on file.    History of Present Illness  The patient presents for evaluation of a mass on his left leg, shoulder pain, and depression.    Patient has ongoing bilateral shoulder pain.  He states is been a chronic condition lasting several months, worsened recently.  He states the pain is so bad he now has difficulty raising arms above head or carrying anything of any significant weight.  Denies any numbness, tingling, weakness in the arms.  No significant history.  Here for further evaluation.    He reports noticing a mass on his left shin for about a month. The mass is not painful unless touched and is located at the level of his sock line. It has bled on a few occasions. He has been applying Neosporin and covering it with gauze at night but stopped this treatment in the past few days. No pus discharge has been observed from the mass.    Regarding his mood, he experiences fluctuations, with some days being good and others not as much. He has been taking Lexapro since 11/2024, which he feels has helped, but he still experiences mood swings. He reports no suicidal thoughts or self-harm tendencies. Several significant life events have occurred recently, including the death of his mother in 01/2024 and his brother's death on Christmas morning in 12/2024. He also contracted COVID-19 around 01/04/2025. He finds it challenging to cope with these events. Additionally, he experiences stomach discomfort around 4:00 or 5:00 PM, which he believes may be a side effect of Lexapro.    PAST SURGICAL HISTORY:  Ankle surgery      Allergies[1]      Current medicines (including changes today)  Current Medications[2]  He  has a past medical history of Bladder cancer (HCC), Cancer (HCC), Closed right ankle fracture, Depression,  "Generalized anxiety disorder (03/31/2020), Gout, Hydrocele (11/22/2021), Hyperlipidemia, Hypertension, and Lip injury.    He has no past medical history of Psychosis (HCC), Self-injurious behavior, or Suicide attempt (HCC).    ROS   ROS  -See HPI     Objective:     Physical Exam:  /72 (BP Location: Right arm, Patient Position: Sitting, BP Cuff Size: Adult)   Pulse 68   Temp 36.6 °C (97.9 °F) (Temporal)   Resp 16   Ht 1.829 m (6' 0.01\")   Wt 81.6 kg (180 lb)   SpO2 98%  Body mass index is 24.41 kg/m².   Constitutional: Alert, no distress, well-groomed.  Skin: No rashes in visible areas.  12 mm x 10 mm circular lesion noted on the anterior aspect of tibia on left leg.  Nontender to palpation.  Slightly violaceous with central eschar and rolled edges.  Eye: Round. Conjunctiva clear, lids normal. No icterus.   ENMT: Lips pink without lesions, good dentition, moist mucous membranes. Phonation normal.  Neck: No masses, no thyromegaly. Moves freely without pain.  Respiratory: Unlabored respiratory effort, no cough or audible wheeze  Psych: Alert and oriented x3, normal affect and mood.       Results    Depression Screening    Little interest or pleasure in doing things?  1 - several days  Feeling down, depressed , or hopeless? 1 - several days  Trouble falling or staying asleep, or sleeping too much?  1 - several days  Feeling tired or having little energy?  1 - several days  Poor appetite or overeating?  1 - several days  Feeling bad about yourself - or that you are a failure or have let yourself or your family down? 1 - several days  Trouble concentrating on things, such as reading the newspaper or watching television? 1 - several days  Moving or speaking so slowly that other people could have noticed.  Or the opposite - being so fidgety or restless that you have been moving around a lot more than usual?  1 - several days  Thoughts that you would be better off dead, or of hurting yourself?  1 - several " days  Patient Health Questionnaire Score: 9      Assessment and Plan:     Assessment & Plan  Left leg mass  The mass does not appear to be infectious. It could potentially be skin cancer, although this is less likely.  Diagnostic plan: Referral to a dermatologist will be made for further evaluation.  Treatment plan: He is advised to keep the mass covered and padded, and to apply Polysporin or triple antibiotic ointment if necessary. Aquaphor healing ointment is recommended for application.  Clinical decision making: He should inform us immediately if there are any changes in the size or pain level of the mass.    Shoulder pain  He is encouraged to maintain an active lifestyle and not to limit his activities due to the shoulder pain.  Diagnostic plan: Referral to an orthopedist at [facility] will be made for further evaluation of his shoulder pain.  Treatment plan: Continued activity is recommended to prevent muscle atrophy and maintain strength.    Depression  Chronic condition, unstable.  Medication change needed.  His Lexapro dosage will be increased to 20 mg.  Diagnostic plan: Referral to a counselor will be made for cognitive behavioral therapy.  Treatment plan: He is advised to continue taking the medication and to be patient as it may take about a month to see improvement.    Follow-up: Inform us immediately if there are any changes in the size or pain level of the mass.    Orders:  1. Leg skin lesion, left  - Referral to Dermatology    2. Chronic pain of both shoulders  - Referral to Orthopedics    3. Adjustment disorder with depressed mood  - escitalopram (LEXAPRO) 20 MG tablet; Take 1 Tablet by mouth every day.  Dispense: 90 Tablet; Refill: 3  - Referral to Behavioral Health        Followup: No follow-ups on file.         PLEASE NOTE: This dictation was created using voice recognition and [employer] ambient listening software. I have made every reasonable attempt to correct obvious errors,  but I expect that there are errors of grammar and possibly content that I did not discover before finalizing the note.         [1] No Known Allergies  [2]   Current Outpatient Medications   Medication Sig Dispense Refill    colchicine (COLCRYS) 0.6 MG Tab TAKE 2 TABLETS ONCE, THEN 1 TABLET 1 HOUR LATER AS     NEEDED FOR GOUT ATTACK 10 Tablet 0    traZODone (DESYREL) 50 MG Tab TAKE 1 TABLET BY MOUTH AT  BEDTIME AS NEEDED FOR SLEEP 90 Tablet 0    lisinopril (PRINIVIL) 20 MG Tab TAKE 1 TABLET BY MOUTH DAILY 90 Tablet 3    atorvastatin (LIPITOR) 40 MG Tab TAKE 1 TABLET BY MOUTH EVERY  EVENING 90 Tablet 3    escitalopram (LEXAPRO) 10 MG Tab Take 1 Tablet by mouth every day. 90 Tablet 3     No current facility-administered medications for this visit.

## 2025-07-30 NOTE — Clinical Note
REFERRAL APPROVAL NOTICE         Sent on July 30, 2025                   Alfredo Holm  [address]  [place] [address]                   Dear Mr. Holm,    After a careful review of the medical information and benefit coverage, [facility] has processed your referral. See below for additional details.    If applicable, you must be actively enrolled with your insurance for coverage of the authorized service. If you have any questions regarding your coverage, please contact your insurance directly.    REFERRAL INFORMATION   Referral #:  [id number]  Referred-To Provider    Referred-By Provider:  Behavioral Health    Eliseo Ramsay M.D.   [facility]      [address] [facility] [address] [place] [address]  [phone] [address] [place] [address]  [phone]    Referral Start Date:  07/24/2025  Referral End Date:   07/24/2026             SCHEDULING  If you do not already have an appointment, please call [phone] to make an appointment.     MORE INFORMATION  If you do not already have a [employer] account, sign up at: [email or web address]  You can access your medical information, make appointments, see lab results, billing information, and more.  If you have questions regarding this referral, please contact  the [facility] Referrals department at:             [phone]. Monday - Friday 8:00AM - 5:00PM.     Sincerely,    [facility]

## 2025-07-30 NOTE — Clinical Note
REFERRAL APPROVAL NOTICE         Sent on July 30, 2025                   Alfredo Holm  [address]  [facility] [address]                   Dear Mr. Holm,    After a careful review of the medical information and benefit coverage, [facility] has processed your referral. See below for additional details.    If applicable, you must be actively enrolled with your insurance for coverage of the authorized service. If you have any questions regarding your coverage, please contact your insurance directly.    REFERRAL INFORMATION   Referral #:  [id number]  Referred-To Department    Referred-By Provider:  Orthopedics    Eliseo Ramsay M.D.   [facility] (joint)      [address] [place] [id number] [phone] [address] [facility] [place] [address]  [phone]    Referral Start Date:  07/24/2025  Referral End Date:   07/24/2026             SCHEDULING  If you do not already have an appointment, please call [phone] to make an appointment.     MORE INFORMATION  If you do not already have a [employer] account, sign up at: [email or web address]  You can access your medical information, make appointments, see lab results, billing information, and more.  If you have questions regarding this referral, please contact  the [facility] Referrals department at:             [phone]. Monday - Friday 8:00AM - 5:00PM.     Sincerely,    [facility]

## (undated) DEVICE — SET EXTENSION WITH 2 PORTS (48EA/CA) ***PART #2C8610 IS A SUBSTITUTE*****

## (undated) DEVICE — DRESSING XEROFORM 1X8 - (50/BX 4BX/CA)

## (undated) DEVICE — SODIUM CHL IRRIGATION 0.9% 1000ML (12EA/CA)

## (undated) DEVICE — DRESSING TRANSPARENT FILM TEGADERM 4 X 4.75" (50EA/BX)"

## (undated) DEVICE — PACK LOWER EXTREMITY - (2/CA)

## (undated) DEVICE — ELECTRODE DUAL RETURN W/ CORD - (50/PK)

## (undated) DEVICE — GLOVE BIOGEL PI INDICATOR SZ 6.5 SURGICAL PF LF - (50/BX 4BX/CA)

## (undated) DEVICE — MASK ANESTHESIA ADULT  - (100/CA)

## (undated) DEVICE — SUCTION INSTRUMENT YANKAUER BULBOUS TIP W/O VENT (50EA/CA)

## (undated) DEVICE — TOWELS CLOTH SURGICAL - (4/PK 20PK/CA)

## (undated) DEVICE — NEPTUNE 4 PORT MANIFOLD - (20/PK)

## (undated) DEVICE — CLAMP PIN 5 HOLE PIN (2TX4=8)

## (undated) DEVICE — GLOVE BIOGEL SZ 6.5 SURGICAL PF LTX (50PR/BX 4BX/CA)

## (undated) DEVICE — SUTURE 3-0 ETHILON FS-1 - (36/BX) 30 INCH

## (undated) DEVICE — BLADE SURGICAL CLIPPER - (50EA/CA)

## (undated) DEVICE — TRAY SRGPRP PVP IOD WT PRP - (20/CA)

## (undated) DEVICE — GOWN SURGEONS LARGE - (32/CA)

## (undated) DEVICE — CANISTER SUCTION 3000ML MECHANICAL FILTER AUTO SHUTOFF MEDI-VAC NONSTERILE LF DISP  (40EA/CA)

## (undated) DEVICE — GLOVE BIOGEL INDICATOR SZ 8 SURGICAL PF LTX - (50/BX 4BX/CA)

## (undated) DEVICE — BAR CARBON 11MM X 300MM (2TX4=8)

## (undated) DEVICE — HEAD HOLDER JUNIOR/ADULT

## (undated) DEVICE — GLOVE BIOGEL SZ 7.5 SURGICAL PF LTX - (50PR/BX 4BX/CA)

## (undated) DEVICE — PROTECTOR ULNA NERVE - (36PR/CA)

## (undated) DEVICE — SUTURE GENERAL

## (undated) DEVICE — KIT ANESTHESIA W/CIRCUIT & 3/LT BAG W/FILTER (20EA/CA)

## (undated) DEVICE — GLOVE BIOGEL SZ 8 SURGICAL PF LTX - (50PR/BX 4BX/CA)

## (undated) DEVICE — SUTURE 2-0 ETHILON FS - (36/BX) 18 INCH

## (undated) DEVICE — POST ANGLED 30 DEGREE (2TX4=8)

## (undated) DEVICE — SET LEADWIRE 5 LEAD BEDSIDE DISPOSABLE ECG (1SET OF 5/EA)

## (undated) DEVICE — DRAPE C-ARM LARGE 41IN X 74 IN - (10/BX 2BX/CA)

## (undated) DEVICE — GOWN WARMING STANDARD FLEX - (30/CA)

## (undated) DEVICE — KIT ROOM DECONTAMINATION

## (undated) DEVICE — SPONGE GAUZESTER 4 X 4 4PLY - (128PK/CA)

## (undated) DEVICE — ELECTRODE 850 FOAM ADHESIVE - HYDROGEL RADIOTRNSPRNT (50/PK)

## (undated) DEVICE — SLEEVE, VASO, THIGH, MED

## (undated) DEVICE — TUBING CLEARLINK DUO-VENT - C-FLO (48EA/CA)

## (undated) DEVICE — DRILL CALIBRATED STEP 4.0MM X 200MM (2TXT=4)

## (undated) DEVICE — PIN TRANSFIXATION 5MM (2TX4=8)

## (undated) DEVICE — SENSOR SPO2 NEO LNCS ADHESIVE (20/BX) SEE USER NOTES